# Patient Record
Sex: MALE | Race: WHITE | NOT HISPANIC OR LATINO | Employment: PART TIME | URBAN - METROPOLITAN AREA
[De-identification: names, ages, dates, MRNs, and addresses within clinical notes are randomized per-mention and may not be internally consistent; named-entity substitution may affect disease eponyms.]

---

## 2017-02-16 DIAGNOSIS — I10 ESSENTIAL HYPERTENSION: ICD-10-CM

## 2017-02-16 NOTE — TELEPHONE ENCOUNTER
:Lisinopril-hydrochlorothiazide       Last Written Prescription Date: 01/05/16  Last Fill Quantity: 90, # refills: 3  Last Office Visit with G, P or [facility] prescribing provider: 01/05/16       Potassium   Date Value Ref Range Status   11/26/2014 4.0 3.4 - 5.3 mmol/L Final     Creatinine   Date Value Ref Range Status   11/26/2014 0.80 0.66 - 1.25 mg/dL Final     BP Readings from Last 3 Encounters:   01/05/16 126/84   11/26/14 (!) 128/92

## 2017-02-16 NOTE — TELEPHONE ENCOUNTER
Routing refill request to provider for review/approval because:    Patient needs to be seen because: pt instructed in 1/5/2016 OV to   Follow-Up:in 3 months     Patient needs to be seen because it has been more than 1 year since last office visit.

## 2017-02-17 RX ORDER — LISINOPRIL/HYDROCHLOROTHIAZIDE 10-12.5 MG
1 TABLET ORAL DAILY
Qty: 90 TABLET | Refills: 3 | Status: SHIPPED | OUTPATIENT
Start: 2017-02-17 | End: 2018-02-14

## 2018-02-14 ENCOUNTER — OFFICE VISIT (OUTPATIENT)
Dept: INTERNAL MEDICINE | Facility: CLINIC | Age: 58
End: 2018-02-14
Payer: COMMERCIAL

## 2018-02-14 VITALS
BODY MASS INDEX: 27.06 KG/M2 | OXYGEN SATURATION: 97 % | SYSTOLIC BLOOD PRESSURE: 148 MMHG | DIASTOLIC BLOOD PRESSURE: 98 MMHG | HEIGHT: 71 IN | WEIGHT: 193.31 LBS | HEART RATE: 84 BPM | TEMPERATURE: 98.2 F

## 2018-02-14 DIAGNOSIS — Z23 NEED FOR PROPHYLACTIC VACCINATION AND INOCULATION AGAINST INFLUENZA: ICD-10-CM

## 2018-02-14 DIAGNOSIS — Z00.00 ROUTINE GENERAL MEDICAL EXAMINATION AT A HEALTH CARE FACILITY: Primary | ICD-10-CM

## 2018-02-14 DIAGNOSIS — I10 BENIGN ESSENTIAL HYPERTENSION: ICD-10-CM

## 2018-02-14 LAB
ALBUMIN SERPL-MCNC: 4 G/DL (ref 3.4–5)
ALBUMIN UR-MCNC: ABNORMAL MG/DL
ALP SERPL-CCNC: 70 U/L (ref 40–150)
ALT SERPL W P-5'-P-CCNC: 25 U/L (ref 0–70)
ANION GAP SERPL CALCULATED.3IONS-SCNC: 7 MMOL/L (ref 3–14)
APPEARANCE UR: CLEAR
AST SERPL W P-5'-P-CCNC: 19 U/L (ref 0–45)
BACTERIA #/AREA URNS HPF: ABNORMAL /HPF
BILIRUB SERPL-MCNC: 0.8 MG/DL (ref 0.2–1.3)
BILIRUB UR QL STRIP: NEGATIVE
BUN SERPL-MCNC: 13 MG/DL (ref 7–30)
CALCIUM SERPL-MCNC: 9.2 MG/DL (ref 8.5–10.1)
CHLORIDE SERPL-SCNC: 100 MMOL/L (ref 94–109)
CHOLEST SERPL-MCNC: 175 MG/DL
CO2 SERPL-SCNC: 32 MMOL/L (ref 20–32)
COLOR UR AUTO: YELLOW
CREAT SERPL-MCNC: 0.97 MG/DL (ref 0.66–1.25)
ERYTHROCYTE [DISTWIDTH] IN BLOOD BY AUTOMATED COUNT: 13.3 % (ref 10–15)
GFR SERPL CREATININE-BSD FRML MDRD: 79 ML/MIN/1.7M2
GLUCOSE SERPL-MCNC: 103 MG/DL (ref 70–99)
GLUCOSE UR STRIP-MCNC: NEGATIVE MG/DL
HCT VFR BLD AUTO: 45.7 % (ref 40–53)
HDLC SERPL-MCNC: 58 MG/DL
HGB BLD-MCNC: 14.9 G/DL (ref 13.3–17.7)
HGB UR QL STRIP: NEGATIVE
KETONES UR STRIP-MCNC: NEGATIVE MG/DL
LDLC SERPL CALC-MCNC: 100 MG/DL
LEUKOCYTE ESTERASE UR QL STRIP: NEGATIVE
MCH RBC QN AUTO: 32.3 PG (ref 26.5–33)
MCHC RBC AUTO-ENTMCNC: 32.6 G/DL (ref 31.5–36.5)
MCV RBC AUTO: 99 FL (ref 78–100)
MUCOUS THREADS #/AREA URNS LPF: PRESENT /LPF
NITRATE UR QL: NEGATIVE
NONHDLC SERPL-MCNC: 117 MG/DL
PH UR STRIP: 7 PH (ref 5–7)
PLATELET # BLD AUTO: 175 10E9/L (ref 150–450)
POTASSIUM SERPL-SCNC: 3.9 MMOL/L (ref 3.4–5.3)
PROT SERPL-MCNC: 7.3 G/DL (ref 6.8–8.8)
PSA SERPL-ACNC: 11.8 UG/L (ref 0–4)
RBC # BLD AUTO: 4.61 10E12/L (ref 4.4–5.9)
RBC #/AREA URNS AUTO: ABNORMAL /HPF
SODIUM SERPL-SCNC: 139 MMOL/L (ref 133–144)
SOURCE: ABNORMAL
SP GR UR STRIP: 1.02 (ref 1–1.03)
TRIGL SERPL-MCNC: 85 MG/DL
TSH SERPL DL<=0.005 MIU/L-ACNC: 1.41 MU/L (ref 0.4–4)
UROBILINOGEN UR STRIP-ACNC: 0.2 EU/DL (ref 0.2–1)
WBC # BLD AUTO: 5.2 10E9/L (ref 4–11)
WBC #/AREA URNS AUTO: ABNORMAL /HPF

## 2018-02-14 PROCEDURE — 99396 PREV VISIT EST AGE 40-64: CPT | Performed by: INTERNAL MEDICINE

## 2018-02-14 PROCEDURE — 84443 ASSAY THYROID STIM HORMONE: CPT | Performed by: INTERNAL MEDICINE

## 2018-02-14 PROCEDURE — 80061 LIPID PANEL: CPT | Performed by: INTERNAL MEDICINE

## 2018-02-14 PROCEDURE — 90686 IIV4 VACC NO PRSV 0.5 ML IM: CPT | Performed by: INTERNAL MEDICINE

## 2018-02-14 PROCEDURE — G0103 PSA SCREENING: HCPCS | Performed by: INTERNAL MEDICINE

## 2018-02-14 PROCEDURE — 80053 COMPREHEN METABOLIC PANEL: CPT | Performed by: INTERNAL MEDICINE

## 2018-02-14 PROCEDURE — 81001 URINALYSIS AUTO W/SCOPE: CPT | Performed by: INTERNAL MEDICINE

## 2018-02-14 PROCEDURE — 36415 COLL VENOUS BLD VENIPUNCTURE: CPT | Performed by: INTERNAL MEDICINE

## 2018-02-14 PROCEDURE — 85027 COMPLETE CBC AUTOMATED: CPT | Performed by: INTERNAL MEDICINE

## 2018-02-14 PROCEDURE — 90471 IMMUNIZATION ADMIN: CPT | Performed by: INTERNAL MEDICINE

## 2018-02-14 RX ORDER — LISINOPRIL AND HYDROCHLOROTHIAZIDE 20; 25 MG/1; MG/1
1 TABLET ORAL DAILY
Qty: 90 TABLET | Refills: 3 | Status: SHIPPED | OUTPATIENT
Start: 2018-02-14 | End: 2019-02-20

## 2018-02-14 NOTE — PROGRESS NOTES

## 2018-02-14 NOTE — MR AVS SNAPSHOT
After Visit Summary   2/14/2018    Jeramie Sánchez    MRN: [id number]           Patient Information     Date Of Birth          1960        Visit Information        Provider Department      2/14/2018 8:00 AM Maninder Espinoza MD [facility]        Today's Diagnoses     Routine general medical examination at a health care facility    -  1    Benign essential hypertension           Follow-ups after your visit        Additional Services     GASTROENTEROLOGY ADULT REF PROCEDURE ONLY       Last Lab Result: Creatinine (mg/dL)       Date                     Value                 11/26/2014               0.80             ----------  There is no height or weight on file to calculate BMI.     Needed:  No  Language:  English    Patient will be contacted to schedule procedure.     Please be aware that coverage of these services is subject to the terms and limitations of your health insurance plan.  Call member services at your health plan with any benefit or coverage questions.  Any procedures must be performed at a [facility] facility OR coordinated by your clinic's referral office.    Please bring the following with you to your appointment:    (1) Any X-Rays, CTs or MRIs which have been performed.  Contact the facility where they were done to arrange for  prior to your scheduled appointment.    (2) List of current medications   (3) This referral request   (4) Any documents/labs given to you for this referral                  Follow-up notes from your care team     Return in about 4 weeks (around 3/14/2018) for BP Recheck.      Who to contact     If you have questions or need follow up information about today's clinic visit or your schedule please contact [facility] directly at [phone].  Normal or non-critical lab and imaging results will be communicated to you by MyChart, letter or phone within 4 business days after the clinic has received the  "results. If you do not hear from us within 7 days, please contact the clinic through [employer] or phone. If you have a critical or abnormal lab result, we will notify you by phone as soon as possible.  Submit refill requests through [employer] or call your pharmacy and they will forward the refill request to us. Please allow 3 business days for your refill to be completed.          Additional Information About Your Visit        [employer] Information     [employer] lets you send messages to your doctor, view your test results, renew your prescriptions, schedule appointments and more. To sign up, go to [email or web address] . Click on \"Log in\" on the left side of the screen, which will take you to the Welcome page. Then click on \"Sign up Now\" on the right side of the page.     You will be asked to enter the access code listed below, as well as some personal information. Please follow the directions to create your username and password.     Your access code is: [email or web address]  Expires: 5/15/2018  8:32 AM     Your access code will  in 90 days. If you need help or a new code, please call your [facility] clinic or [phone].        Care EveryWhere ID     This is your Care EveryWhere ID. This could be used by other organizations to access your [facility] medical records  [id number]        Your Vitals Were     Pulse Temperature Height Pulse Oximetry BMI (Body Mass Index)       84 98.2  F (36.8  C) (Oral) 5' 10.5\" (1.791 m) 97% 27.35 kg/m2        Blood Pressure from Last 3 Encounters:   18 (!) 148/98   16 126/84   14 (!) 128/92    Weight from Last 3 Encounters:   18 193 lb 5 oz (87.7 kg)   16 191 lb 3.2 oz (86.7 kg)   14 177 lb (80.3 kg)              We Performed the Following     *UA reflex to Microscopic     CBC with platelets     Comprehensive metabolic panel     GASTROENTEROLOGY ADULT REF PROCEDURE ONLY     Lipid panel reflex to direct LDL Fasting     Prostate spec antigen screen     TSH " with free T4 reflex          Today's Medication Changes          These changes are accurate as of 2/14/18  8:32 AM.  If you have any questions, ask your nurse or doctor.               Start taking these medicines.        Dose/Directions    lisinopril-hydrochlorothiazide 20-25 MG per tablet   Commonly known as:  PRINZIDE/ZESTORETIC   Used for:  Benign essential hypertension   Replaces:  lisinopril-hydrochlorothiazide 10-12.5 MG per tablet   Started by:  Maninder Espinoza MD        Dose:  1 tablet   Take 1 tablet by mouth daily   Quantity:  90 tablet   Refills:  3         Stop taking these medicines if you haven't already. Please contact your care team if you have questions.     lisinopril-hydrochlorothiazide 10-12.5 MG per tablet   Commonly known as:  PRINZIDE/ZESTORETIC   Replaced by:  lisinopril-hydrochlorothiazide 20-25 MG per tablet   Stopped by:  Maninder Espinoza MD                Where to get your medicines      These medications were sent to [facility] [address] [place] [address]  AT [address] [address]     Phone:  [phone]     lisinopril-hydrochlorothiazide 20-25 MG per tablet                Primary Care Provider Fax #    Physician No Ref-Primary [phone]       No address on file        Equal Access to Services     ENDY JAMES AH: Oriana abbotto Somarisol, waaxda luqadaha, qaybta kaalmada adeegyada, suha sol. So [facility] [phone].    ATENCIÓN: Si habla español, tiene a gibson disposición servicios gratuitos de asistencia lingüística. ame al [phone].    We comply with applicable federal civil rights laws and [place] laws. We do not discriminate on the basis of race, color, national origin, age, disability, sex, sexual orientation, or gender identity.            Thank you!     Thank you for choosing [facility]  for your care. Our goal is always to provide you  with excellent care. Hearing back from our patients is one way we can continue to improve our services. Please take a few minutes to complete the written survey that you may receive in the mail after your visit with us. Thank you!             Your Updated Medication List - Protect others around you: Learn how to safely use, store and throw away your medicines at www.disposemymeds.org.          This list is accurate as of 2/14/18  8:32 AM.  Always use your most recent med list.                   Brand Name Dispense Instructions for use Diagnosis    lisinopril-hydrochlorothiazide 20-25 MG per tablet    PRINZIDE/ZESTORETIC    90 tablet    Take 1 tablet by mouth daily    Benign essential hypertension       Multi-vitamin Tabs tablet      Take 1 tablet by mouth daily

## 2018-02-14 NOTE — LETTER
February 15, 2018      Jeramie Sánchez  [address] [address] [facility] [address]        Dear ,    We are writing to inform you of your test results.    Your test results fall within the expected range(s) or remain unchanged from previous results.  Please continue with current treatment plan.    Resulted Orders   CBC with platelets   Result Value Ref Range    WBC 5.2 4.0 - 11.0 10e9/L    RBC Count 4.61 4.4 - 5.9 10e12/L    Hemoglobin 14.9 13.3 - 17.7 g/dL    Hematocrit 45.7 40.0 - 53.0 %    MCV 99 78 - 100 fl    MCH 32.3 26.5 - 33.0 pg    MCHC 32.6 31.5 - 36.5 g/dL    RDW 13.3 10.0 - 15.0 %    Platelet Count 175 150 - 450 10e9/L   Comprehensive metabolic panel   Result Value Ref Range    Sodium 139 133 - 144 mmol/L    Potassium 3.9 3.4 - 5.3 mmol/L    Chloride 100 94 - 109 mmol/L    Carbon Dioxide 32 20 - 32 mmol/L    Anion Gap 7 3 - 14 mmol/L    Glucose 103 (H) 70 - 99 mg/dL      Comment:      Fasting specimen    Urea Nitrogen 13 7 - 30 mg/dL    Creatinine 0.97 0.66 - 1.25 mg/dL    GFR Estimate 79 >60 mL/min/1.7m2      Comment:      Non  GFR Calc    GFR Estimate If Black >90 >60 mL/min/1.7m2      Comment:       GFR Calc    Calcium 9.2 8.5 - 10.1 mg/dL    Bilirubin Total 0.8 0.2 - 1.3 mg/dL    Albumin 4.0 3.4 - 5.0 g/dL    Protein Total 7.3 6.8 - 8.8 g/dL    Alkaline Phosphatase 70 40 - 150 U/L    ALT 25 0 - 70 U/L    AST 19 0 - 45 U/L   Lipid panel reflex to direct LDL Fasting   Result Value Ref Range    Cholesterol 175 <200 mg/dL    Triglycerides 85 <150 mg/dL      Comment:      Fasting specimen    HDL Cholesterol 58 >39 mg/dL    LDL Cholesterol Calculated 100 (H) <100 mg/dL      Comment:      Above desirable:  100-129 mg/dl  Borderline High:  130-159 mg/dL  High:             160-189 mg/dL  Very high:       >189 mg/dl      Non HDL Cholesterol 117 <130 mg/dL   TSH with free T4 reflex   Result Value Ref Range    TSH 1.41 0.40 - 4.00 mU/L   Prostate spec antigen screen    Result Value Ref Range    PSA 11.80 (H) 0 - 4 ug/L      Comment:      Assay Method:  Chemiluminescence using Siemens Vista analyzer   *UA reflex to Microscopic   Result Value Ref Range    Color Urine Yellow     Appearance Urine Clear     Glucose Urine Negative NEG^Negative mg/dL    Bilirubin Urine Negative NEG^Negative    Ketones Urine Negative NEG^Negative mg/dL    Specific Gravity Urine 1.020 1.003 - 1.035    Blood Urine Negative NEG^Negative    pH Urine 7.0 5.0 - 7.0 pH    Protein Albumin Urine Trace (A) NEG^Negative mg/dL    Urobilinogen Urine 0.2 0.2 - 1.0 EU/dL    Nitrite Urine Negative NEG^Negative    Leukocyte Esterase Urine Negative NEG^Negative    Source Midstream Urine    Urine Microscopic   Result Value Ref Range    WBC Urine O - 2 OTO2^O - 2 /HPF    RBC Urine O - 2 OTO2^O - 2 /HPF    Bacteria Urine Few (A) NEG^Negative /HPF    Mucous Urine Present (A) NEG^Negative /LPF       If you have any questions or concerns, please call the clinic at the number listed above.       Sincerely,  Maninder Espinoza MD

## 2018-02-14 NOTE — NURSING NOTE
"Chief Complaint   Patient presents with     Physical     Fasting Px       Initial BP (!) 148/98  Pulse 84  Temp 98.2  F (36.8  C) (Oral)  Ht 5' 10.5\" (1.791 m)  Wt 193 lb 5 oz (87.7 kg)  SpO2 97%  BMI 27.35 kg/m2 Estimated body mass index is 27.35 kg/(m^2) as calculated from the following:    Height as of this encounter: 5' 10.5\" (1.791 m).    Weight as of this encounter: 193 lb 5 oz (87.7 kg).  Medication Reconciliation: complete   Lilo Terry MA      "

## 2018-04-09 ENCOUNTER — TELEPHONE (OUTPATIENT)
Dept: INTERNAL MEDICINE | Facility: CLINIC | Age: 58
End: 2018-04-09

## 2018-04-09 NOTE — TELEPHONE ENCOUNTER
Panel Management Review      Patient has the following on his problem list:     Hypertension   Last three blood pressure readings:  BP Readings from Last 3 Encounters:   02/14/18 (!) 148/98   01/05/16 126/84   11/26/14 (!) 128/92     Blood pressure: FAILED    HTN Guidelines:  Age 18-59 BP range:  Less than 140/90  Age 60-85 with Diabetes:  Less than 140/90  Age 60-85 without Diabetes:  less than 150/90      Composite cancer screening  Chart review shows that this patient is due/due soon for the following Colonoscopy  Summary:    Patient is due/failing the following:   COLONOSCOPY    Action needed:   none    Type of outreach:    Phone, spoke to patient.  Scheduled Colonoscopy for 05/04/2018    Questions for provider review:    None                                                                                                                                    Lilo Terry MA       Chart routed to none .

## 2018-04-24 ENCOUNTER — TRANSFERRED RECORDS (OUTPATIENT)
Dept: HEALTH INFORMATION MANAGEMENT | Facility: CLINIC | Age: 58
End: 2018-04-24

## 2018-05-04 ENCOUNTER — HOSPITAL ENCOUNTER (OUTPATIENT)
Facility: CLINIC | Age: 58
Discharge: HOME | End: 2018-05-04
Attending: INTERNAL MEDICINE | Admitting: INTERNAL MEDICINE
Payer: COMMERCIAL

## 2018-05-04 VITALS
BODY MASS INDEX: 26.88 KG/M2 | SYSTOLIC BLOOD PRESSURE: 126 MMHG | WEIGHT: 192 LBS | HEIGHT: 71 IN | RESPIRATION RATE: 5 BRPM | OXYGEN SATURATION: 95 % | DIASTOLIC BLOOD PRESSURE: 95 MMHG

## 2018-05-04 LAB — COLONOSCOPY: NORMAL

## 2018-05-04 PROCEDURE — 88305 TISSUE EXAM BY PATHOLOGIST: CPT | Performed by: INTERNAL MEDICINE

## 2018-05-04 PROCEDURE — G0500 MOD SEDAT ENDO SERVICE >5YRS: HCPCS | Performed by: INTERNAL MEDICINE

## 2018-05-04 PROCEDURE — 88305 TISSUE EXAM BY PATHOLOGIST: CPT | Mod: 26 | Performed by: INTERNAL MEDICINE

## 2018-05-04 PROCEDURE — 25000128 H RX IP 250 OP 636: Performed by: INTERNAL MEDICINE

## 2018-05-04 PROCEDURE — 45385 COLONOSCOPY W/LESION REMOVAL: CPT | Performed by: INTERNAL MEDICINE

## 2018-05-04 PROCEDURE — 99153 MOD SED SAME PHYS/QHP EA: CPT | Performed by: INTERNAL MEDICINE

## 2018-05-04 RX ORDER — FENTANYL CITRATE 50 UG/ML
INJECTION, SOLUTION INTRAMUSCULAR; INTRAVENOUS PRN
Status: DISCONTINUED | OUTPATIENT
Start: 2018-05-04 | End: 2018-05-04 | Stop reason: HOSPADM

## 2018-05-04 RX ORDER — LIDOCAINE 40 MG/G
CREAM TOPICAL
Status: DISCONTINUED | OUTPATIENT
Start: 2018-05-04 | End: 2018-05-04 | Stop reason: HOSPADM

## 2018-05-04 RX ORDER — ONDANSETRON 2 MG/ML
4 INJECTION INTRAMUSCULAR; INTRAVENOUS
Status: DISCONTINUED | OUTPATIENT
Start: 2018-05-04 | End: 2018-05-04 | Stop reason: HOSPADM

## 2018-05-04 NOTE — H&P
"Pre-Endoscopy History and Physical     Jeramie Sánchez MRN# [id number]   YOB: 1960 Age: 57 year old     Date of Procedure: 5/4/2018  Primary care provider: No Ref-Primary, Physician  Type of Endoscopy: colonoscopy  Reason for Procedure: colonoscopy  Type of Anesthesia Anticipated: Conscious Sedation    HPI:    Jeramie is a 57 year old male who will be undergoing the above procedure.      A history and physical has been performed. The patient's medications and allergies have been reviewed. The risks and benefits of the procedure and the sedation options and risks were discussed with the patient.  All questions were answered and informed consent was obtained.      He denies a personal or family history of anesthesia complications or bleeding disorders.     Patient Active Problem List   Diagnosis     Hip pain     Essential hypertension        Past Medical History:   Diagnosis Date     Arthritis      Hypertension         Past Surgical History:   Procedure Laterality Date     ARTHROPLASTY HIP ANTERIOR Left 11/25/2014    Procedure: ARTHROPLASTY HIP ANTERIOR;  Surgeon: Drew Phelps MD;  Location: [facility] OR     ENT SURGERY      jaw repair for dental reasons       Relevant Family History: NONE    Relevant Social History: NONE     Prior to Admission medications    Medication Sig Start Date End Date Taking? Authorizing Provider   lisinopril-hydrochlorothiazide (PRINZIDE/ZESTORETIC) 20-25 MG per tablet Take 1 tablet by mouth daily 2/14/18  Yes Maninder Espinoza MD   multivitamin, therapeutic with minerals (MULTI-VITAMIN) TABS Take 1 tablet by mouth daily   Yes Reported, Patient       No Known Allergies     REVIEW OF SYSTEMS:   A relevant review of systems was performed and was negative    PHYSICAL EXAM:   Resp 20  Ht 1.791 m (5' 10.5\")  Wt 87.1 kg (192 lb)  SpO2 97%  BMI 27.16 kg/m2 Estimated body mass index is 27.16 kg/(m^2) as calculated from the following:    Height as of this encounter: 1.791 m (5' " "10.5\").    Weight as of this encounter: 87.1 kg (192 lb).   GENERAL APPEARANCE: alert, and oriented  MENTAL STATUS: alert  AIRWAY EXAM: Normal  RESP: lungs clear to auscultation - no rales, rhonchi or wheezes  CV: regular rates and rhythm  DIAGNOSTICS:    Not indicated    IMPRESSION   ASA Class 2 - Mild systemic disease    PLAN:   Plan for colonoscopy. We discussed the risks, benefits and alternatives and the patient wished to proceed.      Signed Electronically by: Torsten Longoria  May 4, 2018            "

## 2018-05-04 NOTE — IP AVS SNAPSHOT
MRN:[id number]                      After Visit Summary   5/4/2018    Jeramie Sánchez    MRN: [id number]           Thank you!     Thank you for choosing [facility] for your care. Our goal is always to provide you with excellent care. Hearing back from our patients is one way we can continue to improve our services. Please take a few minutes to complete the written survey that you may receive in the mail after you visit. If you would like to speak to someone directly about your visit please contact Patient Relations at [phone]. Thank you!          Patient Information     Date Of Birth          1960        About your hospital stay     You were admitted on:  May 4, 2018 You last received care in the:  [facility]    You were discharged on:  May 4, 2018       Who to Call     For medical emergencies, please call 911.  For non-urgent questions about your medical care, please call your primary care provider or clinic, None  For questions related to your surgery, please call your surgery clinic        Attending Provider     Provider Specialty    Link, Torsten SALMERON MD Gastroenterology       Primary Care Provider Fax #    Physician No Ref-Primary [phone]      Your next 10 appointments already scheduled     May 04, 2018  3:30 PM CDT   New Visit with Manda Lunsford PA-C   [facility] ([facility]    [address] [place] [id number] [phone]              Further instructions from your care team            Understanding Colon and Rectal Polyps     The colon has a smooth lining composed of millions of cells.     The colon (also called the large intestine) is a muscular tube that forms the last part of the digestive tract. It absorbs water and stores food waste. The colon is about 4 to 6 feet long. The rectum is the last 6 inches of the colon. The colon and rectum have a smooth lining composed of  millions of cells. Changes in these cells can lead to growths in the colon that can become cancerous and should be removed.     When the Colon Lining Changes  Changes that occur in the cells that line the colon or rectum can lead to growths called polyps. Over a period of years, polyps can turn cancerous. Removing polyps early may prevent cancer from ever forming.      Polyps  Polyps are fleshy clumps of tissue that form on the lining of the colon or rectum. Small polyps are usually benign (not cancerous). However, over time, cells in a polyp can change and become cancerous. The larger a polyp grows, the more likely this is to happen. Also, certain types of polyps known as adenomatous polyps are considered premalignant. This means that they will almost always become cancerous if they re not removed.          Cancer  Almost all colorectal cancers start when polyp cells begin growing abnormally. As a cancerous tumor grows, it may involve more and more of the colon or rectum. In time, cancer can also grow beyond the colon or rectum and spread to nearby organs or to glands called lymph nodes. The cells can also travel to other parts of the body. This is known as metastasis. The earlier a cancerous tumor is removed, the better the chance of preventing its spread.        4780-7998 [facility], [address] [place] [address]. All rights reserved. This information is not intended as a substitute for professional medical care. Always follow your healthcare professional's instructions.    Understanding Diverticulosis and Diverticulitis     Pouches or diverticula usually occur in the lower part of the colon called the sigmoid.      Diverticulitis occurs when the pouches become inflamed.     The colon (large intestine) is the last part of the digestive tract. It absorbs water from stool and changes it from a liquid to a solid. In certain cases, small pouches called diverticula can form in the colon wall. This  condition is called diverticulosis. The pouches can become infected. If this happens, it becomes a more serious problem called diverticulitis. These problems can be painful. But they can be managed.   Managing Your Condition  Diet changes or taking medications are often tried first. These may be enough to bring relief. If the case is bad, surgery may be done. You and your doctor can discuss the plan that is best for you.  If You Have Diverticulosis  Diet changes are often enough to control symptoms. The main changes are adding fiber (roughage) and drinking more water. Fiber absorbs water as it travels through your colon. This helps your stool stay soft and move smoothly. Water helps this process. If needed, you may be told to take over-the-counter stool softeners. To help relieve pain, antispasmodic medications may be prescribed.  If You Have Diverticulitis  Treatment depends on how bad your symptoms are.  For mild symptoms: You may be put on a liquid diet for a short time. You may also be prescribed antibiotics. If these two steps relieve your symptoms, you may then be prescribed a high-fiber diet. If you still have symptoms, your doctor will discuss further treatment options with you.  For severe symptoms: You may need to be admitted to the hospital. There, you can be given IV antibiotics and fluids. Once symptoms are under control, the above treatments may be tried. If these don t control your condition, your doctor may discuss the option of having surgery with you.  [place]fa to Colon Health  Help keep your colon healthy with a diet that includes plenty of high-fiber fruits, vegetables, and whole grains. Drink plenty of liquids like water and juice. Your doctor may also recommend avoiding seeds and nuts.          [id number] [facility], [address] [place] [address], [place] [address]. All rights reserved. This information is not intended as a substitute for professional medical care. Always follow your healthcare  professional's instructions.    HIGH FIBER DIET  Fiber is present in all fruits, vegetables, cereals and grains. Fiber passes through the body undigested. A high fiber diet helps food move through the intestinal tract. The added bulk is helpful in preventing constipation. In people with diverticulosis it serves to clean out the pouches along the colon wall while preventing new ones from forming. A high fiber diet also reduces the risk of colon cancer, decreases blood cholesterol and prevents high blood sugar in people with diabetes.    The foods listed below are high in fiber and should be included in your diet. If you are not used to high fiber foods, start with 1 or 2 foods from this list. Every 3-4 days add a new one to your diet until you are eating 4 high fiber foods per day. This should give you 20-35 Gm of fiber/day. It is also important to drink a lot of water when you are on this diet (6-8 glasses a day). Water causes the fiber to swell and increases the benefit.    FOODS HIGH IN DIETARY FIBER:  BREADS: Made with 100% whole wheat flour; christian, wheat or rye crackers; tortillas, bran muffins  CEREALS: Whole grain cereal with bran (Chex, Raisin Bran, Corn Bran), oatmeal, rolled oats, granola, wheat flakes, brown rice  NUTS: Any nuts  FRUITS: All fresh fruits along with edible skins, (bananas, citrus fruit, mangoes, pears, prunes, raisins, apples, pineapple, apricot, melon, jams and marmalades), fruit juices (especially prune juice)  VEGETABLES: All types, preferably raw or lightly cooked: especially, celery, eggplant, potatoes, spinach, broccoli, brussel sprouts, winter squash, carrots, cauliflower, soybeans, lentils, fresh and dried beans of all kinds  OTHER: Popcorn, any spices      [id number] [facility], [address] [place] [address], [place] [address]. All rights reserved. This information is not intended as a substitute for professional medical care. Always follow your healthcare professional's  "instructions.    Pending Results     No orders found from 2018 to 2018.            Admission Information     Date & Time Provider Department Dept. Phone    2018 Link, Torsten SALMERON MD [facility] [phone]      Your Vitals Were     Blood Pressure Respirations Height Weight Pulse Oximetry BMI (Body Mass Index)    140/78 5 1.791 m (5' 10.5\") 87.1 kg (192 lb) 97% 27.16 kg/m2      [employer] Information     [facility] lets you send messages to your doctor, view your test results, renew your prescriptions, schedule appointments and more. To sign up, go to [email or web address] . Click on \"Log in\" on the left side of the screen, which will take you to the Welcome page. Then click on \"Sign up Now\" on the right side of the page.     You will be asked to enter the access code listed below, as well as some personal information. Please follow the directions to create your username and password.     Your access code is: [email or web address]  Expires: 5/15/2018  9:32 AM     Your access code will  in 90 days. If you need help or a new code, please call your [place] clinic or [phone].        Care EveryWhere ID     This is your Care EveryWhere ID. This could be used by other organizations to access your [place] medical records  [id number]        Equal Access to Services     [facility] : Hadii libia brewer hadasho Soanibalali, waaxda luqadaha, qaybta kaalmada adeegyada, suha reid . So [facility] [phone].    ATENCIÓN: Si habla español, tiene a gibson disposición servicios gratuitos de asistencia lingüística. Arturo al [phone].    We comply with applicable federal civil rights laws and [place] laws. We do not discriminate on the basis of race, color, national origin, age, disability, sex, sexual orientation, or gender identity.               Review of your medicines      UNREVIEWED medicines. Ask your doctor about these medicines        Dose / Directions    " lisinopril-hydrochlorothiazide 20-25 MG per tablet   Commonly known as:  PRINZIDE/ZESTORETIC   Used for:  Benign essential hypertension        Dose:  1 tablet   Take 1 tablet by mouth daily   Quantity:  90 tablet   Refills:  3       Multi-vitamin Tabs tablet        Dose:  1 tablet   Take 1 tablet by mouth daily   Refills:  0                Protect others around you: Learn how to safely use, store and throw away your medicines at www.disposemymeds.org.             Medication List: This is a list of all your medications and when to take them. Check marks below indicate your daily home schedule. Keep this list as a reference.      Medications           Morning Afternoon Evening Bedtime As Needed    lisinopril-hydrochlorothiazide 20-25 MG per tablet   Commonly known as:  PRINZIDE/ZESTORETIC   Take 1 tablet by mouth daily                                Multi-vitamin Tabs tablet   Take 1 tablet by mouth daily

## 2018-05-04 NOTE — LETTER
April 5, 2018      Jeramie Sánchez  [address] [address] [facility] [place] [address]        Dear Jeramie,       Thank you for choosing [facility]. You are scheduled for the following service.     Date:  5-4-18             Procedure:  COLONOSCOPY  Doctor:        Von   Arrival Time:  0830  *check in at Emergency/Endoscopy desk*  Procedure Time:  0900    Location:   [facility]        Endoscopy Department, First Floor (Enter through ER Doors) *        [address] [place], [place] [address]      [phone] or [phone] () to reschedule      MIRALAX -GATORADE  PREP  Colonoscopy is the most accurate test to detect colon polyps and colon cancer; and the only test where polyps can be removed. During this procedure, a doctor examines the lining of your large intestine and rectum through a flexible tube.           Transportation  Arrange for a ride for the day of your procedure with a responsible adult.  A taxi ride is not an option unless you are accompanied by a responsible adult. If you fail to arrange transportation with a responsible adult, your procedure will be cancelled and rescheduled.    Purchase the  following supplies at your local pharmacy:  - 2 (two) bisacodyl tablets: each tablet contains 5 mg.  (Dulcolax  laxative NOT Dulcolax  stool softener)   - 1 (one) 8.3 oz bottle of Polyethylene Glycol (PEG) 3350 Powder   (MiraLAX , Smooth LAX , ClearLAX  or equivalent)  - 64 oz Gatorade    Regular Gatorade, Gatorade G2 , Powerade , Powerade Zero  or Pedialyte  is acceptable. Red colored flavors are not allowed; all other colors (yellow, green, orange, purple and blue) are okay. It is also okay to buy two 2.12 oz packets of powdered Gatorade that can be mixed with water to a total volume of 64 oz of liquid.  - 1 (one) 10 oz bottle of Magnesium Citrate (Red colored flavors are not allowed)  It is also okay for you to use a 0.5 oz package of powdered magnesium  citrate (17 g) mixed with 10 oz of water.    PREPARATION FOR COLONOSCOPY    7 days before:    Discontinue fiber supplements and medications containing iron. This includes Metamucil  and Fibercon ; and multivitamins with iron.  3 days before:    Begin a low-fiber diet. A low-fiber diet helps making the cleanout more effective.     Examples of a low-fiber diet include (but are not limited to): white bread, white rice, pasta, crackers, fish, chicken, eggs, ground beef, creamy peanut butter, cooked/steamed/boiled vegetables, canned fruit, bananas, melons, milk, plain yogurt cheese, salad dressing and other condiments.     The following are not allowed on a low-fiber diet: seeds, nuts, popcorn, bran, whole wheat, corn, quinoa, raw fruits and vegetables, berries and dried fruit, beans and lentils.    For additional details on low-fiber diet, please refer to the table on the last page.  2 days before:    Continue the low-fiber diet.     Drink at least 8 glasses of water throughout the day.     Stop eating solid foods at 11:45 pm.  1 day before:    In the morning: begin a clear liquid diet (liquids you can see through).     Examples of a clear liquid diet include: water, clear broth or bouillon, Gatorade, Pedialyte or Powerade, carbonated and non-carbonated soft drinks (Sprite , 7-Up , ginger ale), strained fruit juices without pulp (apple, white grape, white cranberry), Jell-O  and popsicles.     The following are not allowed on a clear liquid diet: red liquids, alcoholic beverages, coffee, dairy products (milk, creamer, and yogurt), protein shakes, creamy broths, juice with pulp and chewing tobacco.    At noon: take 2 (two) bisacodyl tablets     At 4 (and no later than 6pm): start drinking the Miralax-Gatorade preparation (8.3 oz of Miralax mixed with 64 oz of Gatorade in a large pitcher). Drink 1(one) 8 oz glass every 15 minutes thereafter, until the mixture is gone.    COLON CLEANSING TIPS: drink adequate amounts of  fluids before and after your colon cleansing to prevent dehydration. Stay near a toilet because you will have diarrhea. Even if you are sitting on the toilet, continue to drink the cleansing solution every 15 minutes. If you feel nauseous or vomit, rinse your mouth with water, take a 15 to 30-minute-break and then continue drinking the solution. You will be uncomfortable until the stool has flushed from your colon (in about 2 to 4 hours). You may feel chilled.              Day of your procedure  You may take all of your morning medications including blood pressure medications, blood thinners (if you have not been instructed to stop these by our office), methadone, anti-seizure medications with sips of water 3 hours prior to your procedure or earlier. Do not take insulin or vitamins prior to your procedure. Continue the clear liquid diet.   4 hours prior: drink 10 oz of magnesium citrate. It may be easier to drink it with a straw.    STOP consuming all liquids after that.     Do not take anything by mouth during this time.     Allow extra time to travel to your procedure as you may need to stop and use a restroom along the way.  You are ready for the procedure, if you followed all instructions and your stool is no longer formed, but clear or yellow liquid. If you are unsure whether your colon is clean, please call our office at [phone] before you leave for your appointment.  Bring the following to your procedure:  - Insurance Card/Photo ID.   - List of current medications including over-the-counter medications and supplements.   - Your rescue inhaler if you currently use one to control asthma.      Canceling or rescheduling your appointment:   If you must cancel or reschedule your appointment, please call [phone] as soon as possible.      COLONOSCOPY PRE-PROCEDURE CHECKLIST  If you have diabetes, ask your regular doctor for diet and medication restrictions.  If you take an anticoagulant or anti-platelet  medication (such as Coumadin , Lovenox , Pradaxa , Xarelto , Eliquis , etc.), please call your primary doctor for advice on holding this medication.  If you take aspirin you may continue to do so.  If you are or may be pregnant, please discuss the risks and benefits of this procedure with your doctor.          What happens during a colonoscopy?    Plan to spend up to two hours, starting at registration time, at the endoscopy center the day of your procedure. The colonoscopy takes an average of 15 to 30 minutes. Recovery time is about 30 minutes.    Before the exam:    You will change into a gown.    Your medical history and medication list will be reviewed with you, unless that has been done over the phone prior to the procedure.     A nurse will insert an intravenous (IV) line into your hand or arm.    The doctor will meet with you and will give you a consent form to sign.    During the exam:     Medicine will be given through the IV line to help you relax.     Your heart rate and oxygen levels will be monitored. If your blood pressure is low, you may be given fluids through the IV line.     The doctor will insert a flexible hollow tube, called a colonoscope, into your rectum. The scope will be advanced slowly through the large intestine (colon).    You may have a feeling of fullness or pressure.     If an abnormal tissue or a polyp is found, the doctor may remove it through the endoscope for closer examination, or biopsy. Tissue removal is painless    After the exam:           Any tissue samples removed during the exam will be sent to a lab for evaluation. It may take 5-7 working days for you to be notified of the results.     A nurse will provide you with complete discharge instructions before you leave the endoscopy center. Be sure to ask the nurse for specific instructions if you take blood thinners such as Aspirin, Coumadin or Plavix.     The doctor will prepare a full report for you and for the physician who  referred you for the procedure.     Your doctor will talk with you about the initial results of your exam.      Medication given during the exam will prohibit you from driving for the rest of the day.     Following the exam, you may resume your normal diet. Your first meal should be light, no greasy foods. Avoid alcohol until the next day.     You may resume your regular activities the day after the procedure.     LOW-FIBER DIET    Foods RECOMMENDED Foods to AVOID   Breads, Cereal, Rice and Pasta:   White bread, rolls, biscuits, croissant and jere toast.   Waffles, Singaporean toast and pancakes.   White rice, noodles, pasta, macaroni and peeled cooked potatoes.   Plain crackers and saltines.   Cooked cereals: farina, cream of rice.   Cold cereals: Puffed Rice , Rice Krispies , Corn Flakes  and Special K    Breads, Cereal, Rice and Pasta:   Breads or rolls with nuts, seeds or fruit.   Whole wheat, pumpernickel, rye breads and cornbread.   Potatoes with skin, brown or wild rice, and kasha (buckwheat).     Vegetables:   Tender cooked and canned vegetables without seeds: carrots, asparagus tips, green or wax beans, pumpkin, spinach, lima beans. Vegetables:   Raw or steamed vegetables.   Vegetables with seeds.   Sauerkraut.   Winter squash, peas, broccoli, Brussel sprouts, cabbage, onions, cauliflower, baked beans, peas and corn.   Fruits:   Strained fruit juice.   Canned fruit, except pineapple.   Ripe bananas and melon. Fruits:   Prunes and prune juice.   Raw fruits.   Dried fruits: figs, dates and raisins.   Milk/Dairy:   Milk: plain or flavored.   Yogurt, custard and ice cream.   Cheese and cottage cheese Milk/Dairy:     Meat and other proteins:   ground, well-cooked tender beef, lamb, ham, veal, pork, fish, poultry and organ meats.   Eggs.   Peanut butter without nuts. Meat and other proteins:   Tough, fibrous meats with gristle.   Dry beans, peas and lentils.   Peanut butter with nuts.   Tofu.   Fats, Snack, Sweets,  Condiments and Beverages:   Margarine, butter, oils, mayonnaise, sour cream and salad dressing, plain gravy.   Sugar, hard candy, clear jelly, honey and syrup.   Spices, cooked herbs, bouillon, broth and soups made with allowed vegetable, ketchup and mustard.   Coffee, tea and carbonated drinks.   Plain cakes, cookies and pretzels.   Gelatin, plain puddings, custard, ice cream, sherbet and popsicles. Fats, Snack, Sweets, Condiments and Beverages:   Nuts, seeds and coconut.   Jam, marmalade and preserves.   Pickles, olives, relish and horseradish.   All desserts containing nuts, seeds, dried fruit and coconut; or made from whole grains or bran.   Candy made with nuts or seeds.   Popcorn.                     DIRECTIONS TO THE ENDOSCOPY DEPARTMENT     From the north ([facility])  Take [address] South, exit on [address] [address]. Get into the left hand alban, turn left (east), go one-half mile to [address] and turn left. Go north to the first stoplight, take a right on [address] and follow it to the Emergency entrance.    From the south ([facility])  Take 35N to the 35E split and exit on [address] [address]. On [address] [address], turn left (west) to [address]. Turn right (north) on [address]. Go north to the first stoplight, take a right on [address] and follow it to the Emergency entrance.    From the east via 35E ([facility])  Take [address]E south to [address] [address] exit. Turn right on [address] [address]. Go west to [address]. Turn right (north) on [address]. Go to the first stoplight, take a right and follow on [address] to the Emergency entrance.    From the east via [address] ([facility])  Take [address] West to [address]. Turn left (south) on [address] to [address]. Turn left (east) on [address] and follow it to the Emergency entrance.    From the west via [address] ([place])  Take [address] east to [address].  Turn right (south) on [address] to [address]. Turn left (east) on [address] and follow it to the Emergency entrance.

## 2018-05-04 NOTE — DISCHARGE INSTRUCTIONS
Understanding Colon and Rectal Polyps     The colon has a smooth lining composed of millions of cells.     The colon (also called the large intestine) is a muscular tube that forms the last part of the digestive tract. It absorbs water and stores food waste. The colon is about 4 to 6 feet long. The rectum is the last 6 inches of the colon. The colon and rectum have a smooth lining composed of millions of cells. Changes in these cells can lead to growths in the colon that can become cancerous and should be removed.     When the Colon Lining Changes  Changes that occur in the cells that line the colon or rectum can lead to growths called polyps. Over a period of years, polyps can turn cancerous. Removing polyps early may prevent cancer from ever forming.      Polyps  Polyps are fleshy clumps of tissue that form on the lining of the colon or rectum. Small polyps are usually benign (not cancerous). However, over time, cells in a polyp can change and become cancerous. The larger a polyp grows, the more likely this is to happen. Also, certain types of polyps known as adenomatous polyps are considered premalignant. This means that they will almost always become cancerous if they re not removed.          Cancer  Almost all colorectal cancers start when polyp cells begin growing abnormally. As a cancerous tumor grows, it may involve more and more of the colon or rectum. In time, cancer can also grow beyond the colon or rectum and spread to nearby organs or to glands called lymph nodes. The cells can also travel to other parts of the body. This is known as metastasis. The earlier a cancerous tumor is removed, the better the chance of preventing its spread.        4944-4988 [facility], [address] [place] [address], [place] [address]. All rights reserved. This information is not intended as a substitute for professional medical care. Always follow your healthcare professional's instructions.    Understanding Diverticulosis  and Diverticulitis     Pouches or diverticula usually occur in the lower part of the colon called the sigmoid.      Diverticulitis occurs when the pouches become inflamed.     The colon (large intestine) is the last part of the digestive tract. It absorbs water from stool and changes it from a liquid to a solid. In certain cases, small pouches called diverticula can form in the colon wall. This condition is called diverticulosis. The pouches can become infected. If this happens, it becomes a more serious problem called diverticulitis. These problems can be painful. But they can be managed.   Managing Your Condition  Diet changes or taking medications are often tried first. These may be enough to bring relief. If the case is bad, surgery may be done. You and your doctor can discuss the plan that is best for you.  If You Have Diverticulosis  Diet changes are often enough to control symptoms. The main changes are adding fiber (roughage) and drinking more water. Fiber absorbs water as it travels through your colon. This helps your stool stay soft and move smoothly. Water helps this process. If needed, you may be told to take over-the-counter stool softeners. To help relieve pain, antispasmodic medications may be prescribed.  If You Have Diverticulitis  Treatment depends on how bad your symptoms are.  For mild symptoms: You may be put on a liquid diet for a short time. You may also be prescribed antibiotics. If these two steps relieve your symptoms, you may then be prescribed a high-fiber diet. If you still have symptoms, your doctor will discuss further treatment options with you.  For severe symptoms: You may need to be admitted to the hospital. There, you can be given IV antibiotics and fluids. Once symptoms are under control, the above treatments may be tried. If these don t control your condition, your doctor may discuss the option of having surgery with you.  [place] to Colon Health  Help keep your colon healthy with  a diet that includes plenty of high-fiber fruits, vegetables, and whole grains. Drink plenty of liquids like water and juice. Your doctor may also recommend avoiding seeds and nuts.          5162-5687 [employer], [address] [place] [address], [place] [address]. All rights reserved. This information is not intended as a substitute for professional medical care. Always follow your healthcare professional's instructions.    HIGH FIBER DIET  Fiber is present in all fruits, vegetables, cereals and grains. Fiber passes through the body undigested. A high fiber diet helps food move through the intestinal tract. The added bulk is helpful in preventing constipation. In people with diverticulosis it serves to clean out the pouches along the colon wall while preventing new ones from forming. A high fiber diet also reduces the risk of colon cancer, decreases blood cholesterol and prevents high blood sugar in people with diabetes.    The foods listed below are high in fiber and should be included in your diet. If you are not used to high fiber foods, start with 1 or 2 foods from this list. Every 3-4 days add a new one to your diet until you are eating 4 high fiber foods per day. This should give you 20-35 Gm of fiber/day. It is also important to drink a lot of water when you are on this diet (6-8 glasses a day). Water causes the fiber to swell and increases the benefit.    FOODS HIGH IN DIETARY FIBER:  BREADS: Made with 100% whole wheat flour; christian, wheat or rye crackers; tortillas, bran muffins  CEREALS: Whole grain cereal with bran (Chex, Raisin Bran, Corn Bran), oatmeal, rolled oats, granola, wheat flakes, brown rice  NUTS: Any nuts  FRUITS: All fresh fruits along with edible skins, (bananas, citrus fruit, mangoes, pears, prunes, raisins, apples, pineapple, apricot, melon, jams and marmalades), fruit juices (especially prune juice)  VEGETABLES: All types, preferably raw or lightly cooked: especially, celery, eggplant,  potatoes, spinach, broccoli, brussel sprouts, winter squash, carrots, cauliflower, soybeans, lentils, fresh and dried beans of all kinds  OTHER: Popcorn, any spices      [id number] [employer] [address] [address]. All rights reserved. This information is not intended as a substitute for professional medical care. Always follow your healthcare professional's instructions.

## 2018-05-07 LAB — COPATH REPORT: NORMAL

## 2018-05-21 DIAGNOSIS — R06.83 SNORING: Primary | ICD-10-CM

## 2018-07-10 ENCOUNTER — HOSPITAL PATHOLOGY (OUTPATIENT)
Dept: OTHER | Facility: CLINIC | Age: 58
End: 2018-07-10

## 2018-07-12 LAB — COPATH REPORT: NORMAL

## 2018-08-29 DIAGNOSIS — Z00.00 ROUTINE GENERAL MEDICAL EXAMINATION AT A HEALTH CARE FACILITY: Primary | ICD-10-CM

## 2018-09-06 ENCOUNTER — OFFICE VISIT (OUTPATIENT)
Dept: SLEEP MEDICINE | Facility: CLINIC | Age: 58
End: 2018-09-06
Attending: OTOLARYNGOLOGY
Payer: COMMERCIAL

## 2018-09-06 VITALS
OXYGEN SATURATION: 100 % | SYSTOLIC BLOOD PRESSURE: 131 MMHG | DIASTOLIC BLOOD PRESSURE: 93 MMHG | WEIGHT: 190.6 LBS | HEIGHT: 71 IN | BODY MASS INDEX: 26.68 KG/M2 | RESPIRATION RATE: 18 BRPM | HEART RATE: 71 BPM

## 2018-09-06 DIAGNOSIS — I10 ESSENTIAL HYPERTENSION: ICD-10-CM

## 2018-09-06 DIAGNOSIS — R06.83 SNORING: ICD-10-CM

## 2018-09-06 DIAGNOSIS — R06.81 WITNESSED APNEIC SPELLS: ICD-10-CM

## 2018-09-06 DIAGNOSIS — R29.818 SUSPECTED SLEEP APNEA: Primary | ICD-10-CM

## 2018-09-06 PROCEDURE — 99203 OFFICE O/P NEW LOW 30 MIN: CPT | Performed by: INTERNAL MEDICINE

## 2018-09-06 NOTE — MR AVS SNAPSHOT
After Visit Summary   9/6/2018    Jeramie Sánchez    MRN: [id number]           Patient Information     Date Of Birth          1960        Visit Information        Provider Department      9/6/2018 9:00 AM Jus Patricia MD [facility]        Today's Diagnoses     Suspected sleep apnea    -  1    Snoring        Witnessed apneic spells        Essential hypertension          Care Instructions      Your BMI is Body mass index is 26.96 kg/(m^2).  Weight management is a personal decision.  If you are interested in exploring weight loss strategies, the following discussion covers the approaches that may be successful. Body mass index (BMI) is one way to tell whether you are at a healthy weight, overweight, or obese. It measures your weight in relation to your height.  A BMI of 18.5 to 24.9 is in the healthy range. A person with a BMI of 25 to 29.9 is considered overweight, and someone with a BMI of 30 or greater is considered obese. More than two-thirds of American adults are considered overweight or obese.  Being overweight or obese increases the risk for further weight gain. Excess weight may lead to heart disease and diabetes.  Creating and following plans for healthy eating and physical activity may help you improve your health.  Weight control is part of healthy lifestyle and includes exercise, emotional health, and healthy eating habits. Careful eating habits lifelong are the mainstay of weight control. Though there are significant health benefits from weight loss, long-term weight loss with diet alone may be very difficult to achieve- studies show long-term success with dietary management in less than 10% of people. Attaining a healthy weight may be especially difficult to achieve in those with severe obesity. In some cases, medications, devices and surgical management might be considered.  What can you do?  If you are overweight or obese and are interested in methods for weight  loss, you should discuss this with your provider.     Consider reducing daily calorie intake by 500 calories.     Keep a food journal.     Avoiding skipping meals, consider cutting portions instead.    Diet combined with exercise helps maintain muscle while optimizing fat loss. Strength training is particularly important for building and maintaining muscle mass. Exercise helps reduce stress, increase energy, and improves fitness. Increasing exercise without diet control, however, may not burn enough calories to loose weight.       Start walking three days a week 10-20 minutes at a time    Work towards walking thirty minutes five days a week     Eventually, increase the speed of your walking for 1-2 minutes at time    In addition, we recommend that you review healthy lifestyles and methods for weight loss available through the National Institutes of Health patient information sites:  http://win.niddk.nih.gov/publications/index.htm    And look into health and wellness programs that may be available through your health insurance provider, employer, local community center, or tito club.    Weight management plan: Patient was referred to their PCP to discuss a diet and exercise plan.    Patient to follow up with Primary Care provider regarding elevated blood pressure.          Follow-ups after your visit        Your next 10 appointments already scheduled     Sep 17, 2018  1:00 PM CDT   HST  with BED 7 SH SLEEP   [facility] ([facility])    [address] [address]   [phone]            Sep 18, 2018  9:15 AM CDT   HST Drop Off with BED 7 SH SLEEP   [facility] ([facility])    [address] [address]   [phone]            Sep 24, 2018 10:00 AM CDT   Return Sleep Patient with Jus Patricia MD   [facility] ([facility])    [address] [facility]  "[address] [id number] [phone]              Future tests that were ordered for you today     Open Future Orders        Priority Expected Expires Ordered    HST-Home Sleep Apnea Test Routine  3/8/2019 2018            Who to contact     If you have questions or need follow up information about today's clinic visit or your schedule please contact [facility] directly at [phone].  Normal or non-critical lab and imaging results will be communicated to you by SoSociohart, letter or phone within 4 business days after the clinic has received the results. If you do not hear from us within 7 days, please contact the clinic through [facility] or phone. If you have a critical or abnormal lab result, we will notify you by phone as soon as possible.  Submit refill requests through [employer] or call your pharmacy and they will forward the refill request to us. Please allow 3 business days for your refill to be completed.          Additional Information About Your Visit        [employer] Information     [employer] lets you send messages to your doctor, view your test results, renew your prescriptions, schedule appointments and more. To sign up, go to [email or web address] . Click on \"Log in\" on the left side of the screen, which will take you to the Welcome page. Then click on \"Sign up Now\" on the right side of the page.     You will be asked to enter the access code listed below, as well as some personal information. Please follow the directions to create your username and password.     Your access code is: [email or web address]  Expires: 2018  9:40 AM     Your access code will  in 90 days. If you need help or a new code, please call your [facility] clinic or [phone].        Care EveryWhere ID     This is your Care EveryWhere ID. This could be used by other organizations to access your [facility] medical records  [id number]        Your Vitals Were     Pulse Respirations Height Pulse " "Oximetry BMI (Body Mass Index)       71 18 1.791 m (5' 10.5\") 100% 26.96 kg/m2        Blood Pressure from Last 3 Encounters:   09/06/18 (!) 131/93   05/04/18 (!) 126/95   02/14/18 (!) 148/98    Weight from Last 3 Encounters:   09/06/18 86.5 kg (190 lb 9.6 oz)   05/04/18 87.1 kg (192 lb)   02/14/18 87.7 kg (193 lb 5 oz)              We Performed the Following     SLEEP EVALUATION & MANAGEMENT REFERRAL - ADULT -[facility] [phone]  (Age 18 and up)        Primary Care Provider Fax #    Physician No Ref-Primary [phone]       No address on file        Equal Access to Services     ENDY JAMES : Oriana Malik, waronal luqadaha, qaybta kaalmada tobias, suha reid . So [facility] [phone].    ATENCIÓN: Si habla español, tiene a gibson disposición servicios gratuitos de asistencia lingüística. Llame al [phone].    We comply with applicable federal civil rights laws and [place] laws. We do not discriminate on the basis of race, color, national origin, age, disability, sex, sexual orientation, or gender identity.            Thank you!     Thank you for choosing [facility]  for your care. Our goal is always to provide you with excellent care. Hearing back from our patients is one way we can continue to improve our services. Please take a few minutes to complete the written survey that you may receive in the mail after your visit with us. Thank you!             Your Updated Medication List - Protect others around you: Learn how to safely use, store and throw away your medicines at www.disposemymeds.org.          This list is accurate as of 9/6/18  9:48 AM.  Always use your most recent med list.                   Brand Name Dispense Instructions for use Diagnosis    lisinopril-hydrochlorothiazide 20-25 MG per tablet    PRINZIDE/ZESTORETIC    90 tablet    Take 1 tablet by mouth daily    Benign essential hypertension       " Multi-vitamin Tabs tablet      Take 1 tablet by mouth daily

## 2018-09-06 NOTE — NURSING NOTE
"Chief Complaint   Patient presents with     Sleep Problem       Initial BP (!) 142/92  Pulse 71  Resp 18  Ht 1.791 m (5' 10.5\")  Wt 86.5 kg (190 lb 9.6 oz)  SpO2 100%  BMI 26.96 kg/m2 Estimated body mass index is 26.96 kg/(m^2) as calculated from the following:    Height as of this encounter: 1.791 m (5' 10.5\").    Weight as of this encounter: 86.5 kg (190 lb 9.6 oz).    Medication Reconciliation: complete    Neck circumference: 41 centimeters    ESS 2    Laureen Ishpeming  Sleep Clinic - Specialist        "

## 2018-09-06 NOTE — PROGRESS NOTES
Sleep Evaluation:    Date on this visit: 9/6/2018    Primary Physician: No Ref-Primary, Physician     Chief complaint: snoring, apneas in sleep     Presenting History:     Jeramie Sánchez is sent by Marcellus Islas for a sleep consultation regarding possible sleep apnea.    Jeramie Sánchez reports nightly snoring for last one year.     Jeramie goes to sleep at 9:30 PM during the week. He wakes up at 5:00 AM with an alarm. He falls asleep in 10 minutes.  Jeramie denies difficulty falling asleep.  He wakes up 0-1 times a night for 10 minutes before falling back to sleep.  Jreamie wakes up to uncertain reasons.  On weekends, Jeramie goes to sleep at 10:00 PM.  He wakes up at 6:00 AM without an alarm. He falls asleep in 10 minutes.  Patient gets an average of 7 hours of sleep per night.     Patient does not use electronics in bed.     Jeramie does not do shift work.  He works day shifts.      Jeramie does snore every night. Patient does have a regular bed partner. There is report of snoring, gasping and snorting.  He does have witnessed apneas. They occasionally sleep separately.  Patient sleeps on his side. He has occasional morning dry mouth, denies no morning headaches and restless legs. Jeramie denies any bruxism, sleep walking, sleep talking, dream enactment, sleep paralysis, cataplexy and hypnogogic/hypnopompic hallucinations.    He denies sleep walking as a child.  Jeramie has difficulty breathing through his nose.      Patient's Austin Sleepiness score 2/24 consistent with no daytime sleepiness.      Jeramie naps 0 times per week. He takes no inadvertant naps.  He denies closing eyes, dozing and falling asleep while driving.   Patient was counseled on the importance of driving while alert, to pull over if drowsy, or nap before getting into the vehicle if sleepy.      He uses 2-3 cups/day of coffee. Last caffeine intake is usually before noon.    Allergies:    No Known Allergies    Medications:    Current  Outpatient Prescriptions   Medication Sig Dispense Refill     lisinopril-hydrochlorothiazide (PRINZIDE/ZESTORETIC) 20-25 MG per tablet Take 1 tablet by mouth daily 90 tablet 3     multivitamin, therapeutic with minerals (MULTI-VITAMIN) TABS Take 1 tablet by mouth daily         Problem List:  Patient Active Problem List    Diagnosis Date Noted     Essential hypertension 01/05/2016     Priority: Medium     Hip pain 11/25/2014     Priority: Medium        Past Medical/Surgical History:  Past Medical History:   Diagnosis Date     Arthritis      Hypertension      Past Surgical History:   Procedure Laterality Date     ARTHROPLASTY HIP ANTERIOR Left 11/25/2014    Procedure: ARTHROPLASTY HIP ANTERIOR;  Surgeon: Drew Phelps MD;  Location:  OR     ENT SURGERY      jaw repair for dental reasons       Social History:    Lives with his wife and works as a . No history of smoking. Occasional use of alcohol.     Social History     Social History     Marital status:      Spouse name: N/A     Number of children: N/A     Years of education: N/A     Occupational History     Not on file.     Social History Main Topics     Smoking status: Never Smoker     Smokeless tobacco: Never Used     Alcohol use Yes      Comment: 3 on weekends     Drug use: Not on file     Sexual activity: Yes     Other Topics Concern     Not on file     Social History Narrative       Family History:    No family history of sleep disorders.     Family History   Problem Relation Age of Onset     Other Cancer Mother      Heart Surgery Father      Diabetes No family hx of      Colon Cancer No family hx of        Review of Systems:  A complete review of systems reviewed by me is negative with the exeption of what has been mentioned in the history of present illness.  CONSTITUTIONAL: NEGATIVE for weight gain/loss, fever, chills, sweats or night sweats, drug allergies.  EYES: NEGATIVE for changes in vision, blind spots, double vision.  ENT:  "NEGATIVE for ear pain, sore throat, sinus pain, post-nasal drip, runny nose, bloody nose  CARDIAC: NEGATIVE for fast heartbeats or fluttering in chest, chest pain or pressure, breathlessness when lying flat, swollen legs or swollen feet.  NEUROLOGIC: NEGATIVE headaches, weakness or numbness in the arms or legs.  DERMATOLOGIC: NEGATIVE for rashes, new moles or change in mole(s)  PULMONARY: NEGATIVE SOB at rest, SOB with activity, dry cough, productive cough, coughing up blood, wheezing or whistling when breathing.    GASTROINTESTINAL: NEGATIVE for nausea or vomitting, loose or watery stools, fat or grease in stools, constipation, abdominal pain, bowel movements black in color or blood noted.  GENITOURINARY: NEGATIVE for pain during urination, blood in urine, urinating more frequently than usual, irregular menstrual periods.  MUSCULOSKELETAL: NEGATIVE for muscle pain, bone or joint pain, swollen joints.  ENDOCRINE: NEGATIVE for increased thirst or urination, diabetes.  LYMPHATIC: NEGATIVE for swollen lymph nodes, lumps or bumps in the breasts or nipple discharge.    Physical Examination:  Vitals: BP (!) 142/92  Pulse 71  Resp 18  Ht 1.791 m (5' 10.5\")  Wt 86.5 kg (190 lb 9.6 oz)  SpO2 100%  BMI 26.96 kg/m2  BMI= Body mass index is 26.96 kg/(m^2).         [place] Total Score 9/6/2018   Total score - [place] 2          GENERAL APPEARANCE: healthy, alert and no distress  EYES: Eyes grossly normal to inspection, PERRL and conjunctivae and sclerae normal  HENT: nose and mouth without ulcers or lesions, oropharynx crowded, uvula elongated and soft palate dependent  NECK: no adenopathy, no asymmetry, masses, or scars and thyroid normal to palpation  RESP: lungs clear to auscultation - no rales, rhonchi or wheezes  CV: regular rates and rhythm, normal S1 S2, no S3 or S4 and no murmur, click or rub  ABDOMEN: soft, nontender, without hepatosplenomegaly or masses  MS: extremities normal- no gross deformities noted  NEURO: " Normal strength and tone, mentation intact and speech normal  PSYCH: mentation appears normal and affect normal/bright  Mallampati Class: IV.  Tonsillar Stage: 1  hidden by pillars.    Impression/Plan:    1. To rule out obstructive sleep apnea  2. Hypertension     - Patient is a 58 years old male, with BMI 26, neck circumference 41 cm, with history of hypertension who presents with a history of loud snoring and witnessed apneas. Oropharynx is crowded on examination. There is a high risk for sleep apnea and an overnight sleep study is recommended. Patient prefers home sleep test. Limitations of HST were discussed.      Plan:     1. Home sleep apnea test         He will follow up with me in approximately two weeks after his sleep study has been competed to review the results and discuss plan of care.       Polysomnography & HST  reviewed.  Obstructive sleep apnea reviewed.  Complications of untreated sleep apnea were reviewed.    I spent a total of 30 minutes with patient with more than 50% in counseling       Jus Patricia     CC: Marcellus Islas

## 2018-09-06 NOTE — PATIENT INSTRUCTIONS

## 2018-09-17 ENCOUNTER — OFFICE VISIT (OUTPATIENT)
Dept: SLEEP MEDICINE | Facility: CLINIC | Age: 58
End: 2018-09-17
Payer: COMMERCIAL

## 2018-09-17 DIAGNOSIS — G47.33 OSA (OBSTRUCTIVE SLEEP APNEA): ICD-10-CM

## 2018-09-17 DIAGNOSIS — I10 ESSENTIAL HYPERTENSION: ICD-10-CM

## 2018-09-17 DIAGNOSIS — R29.818 SUSPECTED SLEEP APNEA: ICD-10-CM

## 2018-09-17 DIAGNOSIS — R06.83 SNORING: ICD-10-CM

## 2018-09-17 DIAGNOSIS — R06.81 WITNESSED APNEIC SPELLS: ICD-10-CM

## 2018-09-17 PROCEDURE — G0399 HOME SLEEP TEST/TYPE 3 PORTA: HCPCS | Performed by: INTERNAL MEDICINE

## 2018-09-17 NOTE — MR AVS SNAPSHOT
"              After Visit Summary   9/17/2018    Jeramie Sánchez    MRN: [id number]           Patient Information     Date Of Birth          1960        Visit Information        Provider Department      9/17/2018 1:00 PM BED 7 SH SLEEP [facility]        Today's Diagnoses     Snoring        Witnessed apneic spells        Essential hypertension        Suspected sleep apnea        JASSON (obstructive sleep apnea)           Follow-ups after your visit        Your next 10 appointments already scheduled     Sep 24, 2018 10:00 AM CDT   Return Sleep Patient with Jus Patricia MD   [facility] ([facility])    [address] [id number] [phone]              Who to contact     If you have questions or need follow up information about today's clinic visit or your schedule please contact [facility] [place] directly at [phone].  Normal or non-critical lab and imaging results will be communicated to you by MyChart, letter or phone within 4 business days after the clinic has received the results. If you do not hear from us within 7 days, please contact the clinic through MyChart or phone. If you have a critical or abnormal lab result, we will notify you by phone as soon as possible.  Submit refill requests through Loudeye or call your pharmacy and they will forward the refill request to us. Please allow 3 business days for your refill to be completed.          Additional Information About Your Visit        MyChart Information     Loudeye lets you send messages to your doctor, view your test results, renew your prescriptions, schedule appointments and more. To sign up, go to [email or web address] . Click on \"Log in\" on the left side of the screen, which will take you to the Welcome page. Then click on \"Sign up Now\" on the right side of the page.     You will be asked to enter the access code listed below, as well as some " personal information. Please follow the directions to create your username and password.     Your access code is: [email or web address]  Expires: 2018  9:40 AM     Your access code will  in 90 days. If you need help or a new code, please call your [place] clinic or [phone].        Care EveryWhere ID     This is your Care EveryWhere ID. This could be used by other organizations to access your [place] medical records  [id number]         Blood Pressure from Last 3 Encounters:   18 (!) 131/93   18 (!) 126/95   18 (!) 148/98    Weight from Last 3 Encounters:   18 86.5 kg (190 lb 9.6 oz)   18 87.1 kg (192 lb)   18 87.7 kg (193 lb 5 oz)              We Performed the Following     HST-Home Sleep Apnea Test        Primary Care Provider Fax #    Physician No Ref-Primary [phone]       No address on file        Equal Access to Services     NABIL [facility] : Hadii libia brewer hadasho Soomaali, waaxda luqadaha, qaybta kaalmada adeegyasharmaine, suha reid . So [facility] [phone].    ATENCIÓN: Si habla español, tiene a gibson disposición servicios gratuitos de asistencia lingüística. Llame al [phone].    We comply with applicable federal civil rights laws and [place] laws. We do not discriminate on the basis of race, color, national origin, age, disability, sex, sexual orientation, or gender identity.            Thank you!     Thank you for choosing [facility]  for your care. Our goal is always to provide you with excellent care. Hearing back from our patients is one way we can continue to improve our services. Please take a few minutes to complete the written survey that you may receive in the mail after your visit with us. Thank you!             Your Updated Medication List - Protect others around you: Learn how to safely use, store and throw away your medicines at www.disposemymeds.org.          This list is accurate as of 18 11:59 PM.   Always use your most recent med list.                   Brand Name Dispense Instructions for use Diagnosis    lisinopril-hydrochlorothiazide 20-25 MG per tablet    PRINZIDE/ZESTORETIC    90 tablet    Take 1 tablet by mouth daily    Benign essential hypertension       Multi-vitamin Tabs tablet      Take 1 tablet by mouth daily

## 2018-09-18 ENCOUNTER — DOCUMENTATION ONLY (OUTPATIENT)
Dept: SLEEP MEDICINE | Facility: CLINIC | Age: 58
End: 2018-09-18
Payer: COMMERCIAL

## 2018-09-18 DIAGNOSIS — G47.33 OSA (OBSTRUCTIVE SLEEP APNEA): ICD-10-CM

## 2018-09-18 NOTE — PROCEDURES
"HOME SLEEP STUDY INTERPRETATION    Patient: Jeramie Sánchez  MRN: [id number]  YOB: 1960  Study Date: 2018  Referring Provider: No Ref-Primary, Physician;   Ordering Provider: Jus Patricia MD, MD     Indications for Home Study: Jeramie Sánchez is a 58 year old male with a history of hypertension who presents with symptoms suggestive of obstructive sleep apnea.    Estimated body mass index is 26.96 kg/(m^2) as calculated from the following:    Height as of 18: 1.791 m (5' 10.5\").    Weight as of 18: 86.5 kg (190 lb 9.6 oz).  Total score - [place]: 2 (2018  9:15 AM)  STOP-BAN/8    Data: A full night home sleep study was performed recording the standard physiologic parameters including body position, movement, sound, nasal pressure, thermal oral airflow, chest and abdominal movements with respiratory inductance plethysmography, and oxygen saturation by pulse oximetry. Pulse rate was estimated by oximetry recording. This study was considered adequate based on > 4 hours of quality oximetry and respiratory recording. As specified by the AASM Manual for the Scoring of Sleep and Associated events, version 2.3, Rule VIII.D 1B, 4% oxygen desaturation scoring for hypopneas is used as a standard of care on all home sleep apnea testing.    Analysis Time:  376.8 minutes    Respiration:   Sleep Associated Hypoxemia: sustained hypoxemia was present. Baseline oxygen saturation was 91.8%.  Time with saturation less than or equal to 88% was 141 minutes. The lowest oxygen saturation was 66%.   Snoring: Snoring was present.  Respiratory events: The home study revealed a presence of 340 obstructive apneas and 0 mixed and central apneas. There were 19 hypopneas resulting in a combined apnea/hypopnea index [AHI] of 57.2 events per hour.  AHI was 46.7 per hour supine, 55.6 per hour prone, 54.2 per hour on left side, and 59.3 per hour on right side.   Pattern: Excluding events noted above, respiratory " rate and pattern was Normal.    Position: Percent of time spent: supine - 2.4%, prone - 3.4%, on left - 33%, on right - 61%.    Heart Rate: By pulse oximetry normal rate was noted.     Assessment:   Severe obstructive sleep apnea.  Sleep associated hypoxemia was present.    Recommendations:  Consider auto-CPAP at 5-15 cmH2O.  Suggest optimizing sleep hygiene and avoiding sleep deprivation.  Weight management.    Diagnosis Code(s): Obstructive Sleep Apnea G47.33, Hypoxemia G47.36    Jus Patricia MD, MD, September 18, 2018   Diplomate, American Board of Psychiatry and Neurology, Sleep Medicine

## 2018-09-18 NOTE — PROGRESS NOTES
This HSAT was performed using a Noxturnal T3 device which recorded snore, sound, movement activity, body position, nasal pressure, oronasal thermal airflow, pulse, oximetry and both chest and abdominal respiratory effort. HSAT data was restricted to the time patient states they were in bed.     HSAT was scored using 1B 4% hypopnea rule.     AHI: 57.2. Snoring was reported as loud.  Time with SpO2 below 89% was 141.0 minutes.   Overall signal quality was good     Pt will follow up with sleep provider to determine appropriate therapy.     Ordering Harshad ZHAO Rakesh, MD Charles O. BA, [facility], RST System Clinical Specialist 9/18/2018

## 2018-09-24 ENCOUNTER — OFFICE VISIT (OUTPATIENT)
Dept: SLEEP MEDICINE | Facility: CLINIC | Age: 58
End: 2018-09-24
Payer: COMMERCIAL

## 2018-09-24 VITALS
WEIGHT: 185.4 LBS | OXYGEN SATURATION: 98 % | DIASTOLIC BLOOD PRESSURE: 91 MMHG | TEMPERATURE: 98.9 F | RESPIRATION RATE: 16 BRPM | HEIGHT: 71 IN | SYSTOLIC BLOOD PRESSURE: 137 MMHG | BODY MASS INDEX: 25.96 KG/M2 | HEART RATE: 75 BPM

## 2018-09-24 DIAGNOSIS — G47.33 OSA (OBSTRUCTIVE SLEEP APNEA): Primary | ICD-10-CM

## 2018-09-24 PROCEDURE — 99213 OFFICE O/P EST LOW 20 MIN: CPT | Performed by: INTERNAL MEDICINE

## 2018-09-24 NOTE — PROGRESS NOTES
Sleep Study Follow-Up Visit:    Date on this visit: 9/24/2018    Jeramie Sánchez comes in today for follow-up of his home sleep study done on 9/17/2018 at the [facility] for possible sleep apnea.    Respiratory events: The home study revealed a presence of 340 obstructive apneas and 0 mixed and central apneas. There were 19 hypopneas resulting in a combined apnea/hypopnea index [AHI] of 57.2 events per hour.  AHI was 46.7 per hour supine, 55.6 per hour prone, 54.2 per hour on left side, and 59.3 per hour on right side.   Pattern: Excluding events noted above, respiratory rate and pattern was Normal.    Sleep Associated Hypoxemia: sustained hypoxemia was present. Baseline oxygen saturation was 91.8%.  Time with saturation less than or equal to 88% was 141 minutes. The lowest oxygen saturation was 66%.   Snoring: Snoring was present.     Position: Percent of time spent: supine - 2.4%, prone - 3.4%, on left - 33%, on right - 61%.     Heart Rate: By pulse oximetry normal rate was noted.      Assessment:   Severe obstructive sleep apnea.  Sleep associated hypoxemia was present.    These findings were reviewed with patient.     Past medical/surgical history, family history, social history, medications and allergies were reviewed.      Problem List:  Patient Active Problem List    Diagnosis Date Noted     JASSON (obstructive sleep apnea) 09/18/2018     Priority: Medium     HST, 9/17/2018, AHI: 57        Essential hypertension 01/05/2016     Priority: Medium     Hip pain 11/25/2014     Priority: Medium        Impression/Plan:    1. Severe JASSON    - Patient was counseled regarding severe sleep apnea, consequences of untreated disease and management options. CPAP therapy is the treatment of choice.     Plan:    1. Start auto PAP therapy     He will follow up with me in about 7 week(s).     Fifteen minutes spent with patient, all of which were spent face-to-face counseling, consulting, coordinating plan  of care.      Dr. Jus Patricia      CC: No Ref-Primary, Physician

## 2018-09-24 NOTE — NURSING NOTE
"Chief Complaint   Patient presents with     Study Results     HST f/u       Initial BP (!) 137/91  Pulse 75  Temp 98.9  F (37.2  C) (Oral)  Resp 16  Ht 1.791 m (5' 10.5\")  Wt 84.1 kg (185 lb 6.4 oz)  SpO2 98%  BMI 26.23 kg/m2 Estimated body mass index is 26.23 kg/(m^2) as calculated from the following:    Height as of this encounter: 1.791 m (5' 10.5\").    Weight as of this encounter: 84.1 kg (185 lb 6.4 oz).    Medication Reconciliation: complete     ESS 2  Rani Shane        "

## 2018-09-24 NOTE — PATIENT INSTRUCTIONS

## 2018-09-24 NOTE — MR AVS SNAPSHOT
After Visit Summary   9/24/2018    Jeramie Sánchez    MRN: [id number]           Patient Information     Date Of Birth          1960        Visit Information        Provider Department      9/24/2018 10:00 AM Jus Patricia MD [facility]        Today's Diagnoses     JASSON (obstructive sleep apnea)    -  1      Care Instructions      Your BMI is Body mass index is 26.23 kg/(m^2).  Weight management is a personal decision.  If you are interested in exploring weight loss strategies, the following discussion covers the approaches that may be successful. Body mass index (BMI) is one way to tell whether you are at a healthy weight, overweight, or obese. It measures your weight in relation to your height.  A BMI of 18.5 to 24.9 is in the healthy range. A person with a BMI of 25 to 29.9 is considered overweight, and someone with a BMI of 30 or greater is considered obese. More than two-thirds of American adults are considered overweight or obese.  Being overweight or obese increases the risk for further weight gain. Excess weight may lead to heart disease and diabetes.  Creating and following plans for healthy eating and physical activity may help you improve your health.  Weight control is part of healthy lifestyle and includes exercise, emotional health, and healthy eating habits. Careful eating habits lifelong are the mainstay of weight control. Though there are significant health benefits from weight loss, long-term weight loss with diet alone may be very difficult to achieve- studies show long-term success with dietary management in less than 10% of people. Attaining a healthy weight may be especially difficult to achieve in those with severe obesity. In some cases, medications, devices and surgical management might be considered.  What can you do?  If you are overweight or obese and are interested in methods for weight loss, you should discuss this with your provider.     Consider  reducing daily calorie intake by 500 calories.     Keep a food journal.     Avoiding skipping meals, consider cutting portions instead.    Diet combined with exercise helps maintain muscle while optimizing fat loss. Strength training is particularly important for building and maintaining muscle mass. Exercise helps reduce stress, increase energy, and improves fitness. Increasing exercise without diet control, however, may not burn enough calories to loose weight.       Start walking three days a week 10-20 minutes at a time    Work towards walking thirty minutes five days a week     Eventually, increase the speed of your walking for 1-2 minutes at time    In addition, we recommend that you review healthy lifestyles and methods for weight loss available through the National Institutes of Health patient information sites:  http://win.niddk.nih.gov/publications/index.htm    And look into health and wellness programs that may be available through your health insurance provider, employer, local community center, or tito club.    Weight management plan: Patient was referred to their PCP to discuss a diet and exercise plan.   Patient to follow up with Primary Care provider regarding elevated blood pressure.            Follow-ups after your visit        Who to contact     If you have questions or need follow up information about today's clinic visit or your schedule please contact [facility] directly at [phone].  Normal or non-critical lab and imaging results will be communicated to you by MyChart, letter or phone within 4 business days after the clinic has received the results. If you do not hear from us within 7 days, please contact the clinic through MyChart or phone. If you have a critical or abnormal lab result, we will notify you by phone as soon as possible.  Submit refill requests through GTx or call your pharmacy and they will forward the refill request to us. Please allow 3 business  "days for your refill to be completed.          Additional Information About Your Visit        careersmorehart Information     Skwibl lets you send messages to your doctor, view your test results, renew your prescriptions, schedule appointments and more. To sign up, go to [email or web address] . Click on \"Log in\" on the left side of the screen, which will take you to the Welcome page. Then click on \"Sign up Now\" on the right side of the page.     You will be asked to enter the access code listed below, as well as some personal information. Please follow the directions to create your username and password.     Your access code is: [email or web address]  Expires: 2018  9:40 AM     Your access code will  in 90 days. If you need help or a new code, please call your [place] clinic or [phone].        Care EveryWhere ID     This is your Care EveryWhere ID. This could be used by other organizations to access your [place] medical records  [id number]        Your Vitals Were     Pulse Temperature Respirations Height Pulse Oximetry BMI (Body Mass Index)    75 98.9  F (37.2  C) (Oral) 16 1.791 m (5' 10.5\") 98% 26.23 kg/m2       Blood Pressure from Last 3 Encounters:   18 (!) 137/91   18 (!) 131/93   18 (!) 126/95    Weight from Last 3 Encounters:   18 84.1 kg (185 lb 6.4 oz)   18 86.5 kg (190 lb 9.6 oz)   18 87.1 kg (192 lb)              We Performed the Following     Comprehensive DME        Primary Care Provider Fax #    Physician No Ref-Primary [phone]       No address on file        Equal Access to Services     ENDY JAMES : Oriana Malik, archana rasmussen, racquel collado, suha sol. So [facility] [phone].    ATENCIÓN: Si habla español, tiene a gibson disposición servicios gratuitos de asistencia lingüística. Llame al [phone].    We comply with applicable federal civil rights laws and [place] laws. We do not " discriminate on the basis of race, color, national origin, age, disability, sex, sexual orientation, or gender identity.            Thank you!     Thank you for choosing [facility]  for your care. Our goal is always to provide you with excellent care. Hearing back from our patients is one way we can continue to improve our services. Please take a few minutes to complete the written survey that you may receive in the mail after your visit with us. Thank you!             Your Updated Medication List - Protect others around you: Learn how to safely use, store and throw away your medicines at www.disposemymeds.org.          This list is accurate as of 9/24/18 10:19 AM.  Always use your most recent med list.                   Brand Name Dispense Instructions for use Diagnosis    lisinopril-hydrochlorothiazide 20-25 MG per tablet    PRINZIDE/ZESTORETIC    90 tablet    Take 1 tablet by mouth daily    Benign essential hypertension       Multi-vitamin Tabs tablet      Take 1 tablet by mouth daily

## 2018-09-26 ENCOUNTER — TELEPHONE (OUTPATIENT)
Dept: SLEEP MEDICINE | Facility: CLINIC | Age: 58
End: 2018-09-26

## 2018-09-26 DIAGNOSIS — G47.33 OSA (OBSTRUCTIVE SLEEP APNEA): ICD-10-CM

## 2018-09-26 NOTE — TELEPHONE ENCOUNTER
Called patient to schedule new CPAP setup appointment.  Left message to call [facility] [phone] when ready to schedule.

## 2018-10-03 ENCOUNTER — TELEPHONE (OUTPATIENT)
Dept: SLEEP MEDICINE | Facility: CLINIC | Age: 58
End: 2018-10-03

## 2018-10-03 DIAGNOSIS — G47.33 OSA (OBSTRUCTIVE SLEEP APNEA): ICD-10-CM

## 2018-10-03 NOTE — TELEPHONE ENCOUNTER
Called patient and scheduled new CPAP setup for 1:00pm on Friday 10/5/18 with Hima DRAKE at [facility].  Gave patient address [address], [address], [place], [place] and [facility] Customer Service # [phone] to call if he has questions or needs to reschedule.

## 2018-10-05 ENCOUNTER — DOCUMENTATION ONLY (OUTPATIENT)
Dept: SLEEP MEDICINE | Facility: CLINIC | Age: 58
End: 2018-10-05
Payer: COMMERCIAL

## 2018-10-05 NOTE — PROGRESS NOTES
Patient was offered choice of vendor and chose [facility].  Patient Jeramie Sánchez was set up at [facility] on October 5, 2018. Patient received a Resmed AirSense 10 Auto. Pressures were set at 5-15 cm H2O.   Patient s ramp is 5 cm H2O for Auto and FLEX/EPR is 2.  Patient received a Resmed Mask name: AIRFIT P10  Pillow mask Size Large, heated tubing and heated humidifier.  Patient is enrolled in the STM Program and does not need to meet compliance. Patient has a follow up on 11/27/2018 with Dr. Patricia.    Anton Cooper

## 2018-10-08 ENCOUNTER — DOCUMENTATION ONLY (OUTPATIENT)
Dept: SLEEP MEDICINE | Facility: CLINIC | Age: 58
End: 2018-10-08

## 2018-10-08 DIAGNOSIS — G47.33 OSA (OBSTRUCTIVE SLEEP APNEA): ICD-10-CM

## 2018-10-08 NOTE — PROGRESS NOTES
3 DAY STM VISIT    Diagnostic AHI:   57.2 HST    Patient contacted for 3 day STM visit  Message left for patient to return call     Device type: Auto-CPAP  PAP settings from order::  CPAP min 5 cm  H20       CPAP max 15 cm  H20        Mask type:    Nasal Mask     Device settings from machine      Min CPAP 5.0            Max CPAP 15.0            Assessment: Nightly usage over four hours.  Action plan: Pt to have f/u 14 day STM visit.  Patient has a follow up visit scheduled:   yes within 31-90 days of set up.

## 2018-10-16 ENCOUNTER — DOCUMENTATION ONLY (OUTPATIENT)
Dept: SLEEP MEDICINE | Facility: CLINIC | Age: 58
End: 2018-10-16

## 2018-10-16 NOTE — PROGRESS NOTES
Left voice mail for patient to reschedule his appointment that was on 11/27/18. Dr. Patricia is out of the office, please reschedule follow-up.    Ligia Moralez

## 2018-10-17 ENCOUNTER — HOSPITAL ENCOUNTER (OUTPATIENT)
Dept: LAB | Facility: CLINIC | Age: 58
Discharge: HOME | End: 2018-10-17
Attending: ORTHOPAEDIC SURGERY | Admitting: ORTHOPAEDIC SURGERY
Payer: COMMERCIAL

## 2018-10-17 DIAGNOSIS — Z00.00 ROUTINE GENERAL MEDICAL EXAMINATION AT A HEALTH CARE FACILITY: ICD-10-CM

## 2018-10-17 LAB — HGB BLD-MCNC: 13.1 G/DL (ref 13.3–17.7)

## 2018-10-17 PROCEDURE — 36415 COLL VENOUS BLD VENIPUNCTURE: CPT | Performed by: ORTHOPAEDIC SURGERY

## 2018-10-17 PROCEDURE — 85018 HEMOGLOBIN: CPT | Performed by: ORTHOPAEDIC SURGERY

## 2018-10-22 ENCOUNTER — DOCUMENTATION ONLY (OUTPATIENT)
Dept: SLEEP MEDICINE | Facility: CLINIC | Age: 58
End: 2018-10-22

## 2018-10-22 DIAGNOSIS — G47.33 OSA (OBSTRUCTIVE SLEEP APNEA): ICD-10-CM

## 2018-10-22 NOTE — PROGRESS NOTES
14 DAY STM VISIT    Diagnostic AHI:   57.2 HST    Message left for patient to return call     Assessment: Pt meeting objective benchmarks.     Action plan: waiting for patient to return call.  and pt to have 30 day STM visit.    Device type: Auto-CPAP  PAP settings: CPAP min 5.0 cm  H20     CPAP max 15.0 cm  H20    95th% pressure 11.2 cm   Mask type:  Nasal Mask  Objective measures: 14 day rolling measures      Compliance  100 %      Leak  7.04 lpm  last  upload      AHI 1.53   last  upload      Average number of minutes 456     Average hours of usage 7.6          Objective measure goal  Compliance   Goal >70%  Leak   Goal < 24 lpm  AHI  Goal < 5  Usage  Goal >240

## 2018-10-23 NOTE — PROGRESS NOTES
Patient called and left a message for virtual care coordinator.  Coordinator called back and left another message requesting a call back again from patient.

## 2018-11-04 ENCOUNTER — DOCUMENTATION ONLY (OUTPATIENT)
Dept: SLEEP MEDICINE | Facility: CLINIC | Age: 58
End: 2018-11-04

## 2018-11-04 DIAGNOSIS — G47.33 OSA (OBSTRUCTIVE SLEEP APNEA): ICD-10-CM

## 2018-11-05 NOTE — PROGRESS NOTES
30 DAY STM VISIT    Diagnostic AHI:  57.2 HST    Message left for patient to return call     Assessment: Pt meeting objective benchmarks.     Action plan: waiting for patient to return call and pt to have 6 month STM visit  Patient has scheduled a follow up visit with Dr. Patricia on 12/6/18.   Device type: Auto-CPAP  PAP settings: CPAP min 5.0 cm  H20     CPAP max 15.0 cm  H20    95th% pressure 11.7 cm  H20   Mask type:  Nasal Mask  Objective measures: 14 day rolling measures      Compliance  100 %      Leak  14.96 lpm  last  upload      AHI 1.71   last  upload      Average number of minutes 429      Objective measure goal  Compliance   Goal >70%  Leak   Goal < 24 lpm  AHI  Goal < 5  Usage  Goal >240

## 2018-11-14 ENCOUNTER — OFFICE VISIT (OUTPATIENT)
Dept: INTERNAL MEDICINE | Facility: CLINIC | Age: 58
End: 2018-11-14
Payer: COMMERCIAL

## 2018-11-14 VITALS
HEART RATE: 86 BPM | BODY MASS INDEX: 27.19 KG/M2 | HEIGHT: 71 IN | WEIGHT: 194.2 LBS | SYSTOLIC BLOOD PRESSURE: 124 MMHG | RESPIRATION RATE: 18 BRPM | TEMPERATURE: 98 F | OXYGEN SATURATION: 99 % | DIASTOLIC BLOOD PRESSURE: 84 MMHG

## 2018-11-14 DIAGNOSIS — Z01.818 PREOP GENERAL PHYSICAL EXAM: Primary | ICD-10-CM

## 2018-11-14 DIAGNOSIS — G47.33 OSA (OBSTRUCTIVE SLEEP APNEA): ICD-10-CM

## 2018-11-14 DIAGNOSIS — I10 ESSENTIAL HYPERTENSION: ICD-10-CM

## 2018-11-14 DIAGNOSIS — M16.10 ARTHRITIS OF HIP: ICD-10-CM

## 2018-11-14 LAB
ALBUMIN UR-MCNC: NEGATIVE MG/DL
APPEARANCE UR: CLEAR
BILIRUB UR QL STRIP: NEGATIVE
COLOR UR AUTO: YELLOW
GLUCOSE UR STRIP-MCNC: NEGATIVE MG/DL
HGB BLD-MCNC: 12.8 G/DL (ref 13.3–17.7)
HGB UR QL STRIP: NEGATIVE
KETONES UR STRIP-MCNC: NEGATIVE MG/DL
LEUKOCYTE ESTERASE UR QL STRIP: NEGATIVE
NITRATE UR QL: NEGATIVE
PH UR STRIP: 5.5 PH (ref 5–7)
RBC #/AREA URNS AUTO: NORMAL /HPF
SOURCE: NORMAL
SP GR UR STRIP: 1 (ref 1–1.03)
UROBILINOGEN UR STRIP-ACNC: 0.2 EU/DL (ref 0.2–1)
WBC #/AREA URNS AUTO: NORMAL /HPF

## 2018-11-14 PROCEDURE — 93000 ELECTROCARDIOGRAM COMPLETE: CPT | Performed by: INTERNAL MEDICINE

## 2018-11-14 PROCEDURE — 99215 OFFICE O/P EST HI 40 MIN: CPT | Performed by: INTERNAL MEDICINE

## 2018-11-14 PROCEDURE — 85018 HEMOGLOBIN: CPT | Performed by: INTERNAL MEDICINE

## 2018-11-14 PROCEDURE — 80048 BASIC METABOLIC PNL TOTAL CA: CPT | Performed by: INTERNAL MEDICINE

## 2018-11-14 PROCEDURE — 36415 COLL VENOUS BLD VENIPUNCTURE: CPT | Performed by: INTERNAL MEDICINE

## 2018-11-14 PROCEDURE — 81001 URINALYSIS AUTO W/SCOPE: CPT | Performed by: INTERNAL MEDICINE

## 2018-11-14 NOTE — NURSING NOTE
"Vital signs:  Temp: 98  F (36.7  C) Temp src: Oral BP: 124/84 Pulse: 86   Resp: 18 SpO2: 99 %     Height: 5' 10.5\" (179.1 cm) Weight: 194 lb 3.2 oz (88.1 kg)  Estimated body mass index is 27.47 kg/(m^2) as calculated from the following:    Height as of this encounter: 5' 10.5\" (1.791 m).    Weight as of this encounter: 194 lb 3.2 oz (88.1 kg).          "

## 2018-11-14 NOTE — MR AVS SNAPSHOT
After Visit Summary   11/14/2018    Jeramie Sánchez    MRN: [id number]           Patient Information     Date Of Birth          1960        Visit Information        Provider Department      11/14/2018 3:00 PM Elsie Lopez MD [facility]        Today's Diagnoses     Preop general physical exam    -  1    Arthritis of hip        Essential hypertension        JASSON (obstructive sleep apnea)          Care Instructions      Before Your Surgery      Call your surgeon if there is any change in your health. This includes signs of a cold or flu (such as a sore throat, runny nose, cough, rash or fever).    Do not smoke, drink alcohol or take over the counter medicine (unless your surgeon or primary care doctor tells you to) for the 24 hours before and after surgery.    If you take prescribed drugs: Follow your doctor s orders about which medicines to take and which to stop until after surgery.    Eating and drinking prior to surgery: follow the instructions from your surgeon    Take a shower or bath the night before surgery. Use the soap your surgeon gave you to gently clean your skin. If you do not have soap from your surgeon, use your regular soap. Do not shave or scrub the surgery site.  Wear clean pajamas and have clean sheets on your bed.           Follow-ups after your visit        Your next 10 appointments already scheduled     Dec 06, 2018 10:00 AM CST   Return Sleep Patient with Jus Patricia MD   [facility] ([facility])    [address] [place] [id number] [phone]              Who to contact     If you have questions or need follow up information about today's clinic visit or your schedule please contact [facility] directly at [phone].  Normal or non-critical lab and imaging results will be communicated to you by MyChart, letter or phone within 4 business days after the clinic has  "received the results. If you do not hear from us within 7 days, please contact the clinic through [employer] or phone. If you have a critical or abnormal lab result, we will notify you by phone as soon as possible.  Submit refill requests through [employer] or call your pharmacy and they will forward the refill request to us. Please allow 3 business days for your refill to be completed.          Additional Information About Your Visit        Care EveryWhere ID     This is your Care EveryWhere ID. This could be used by other organizations to access your [facility] medical records  [id number]        Your Vitals Were     Pulse Temperature Respirations Height Pulse Oximetry BMI (Body Mass Index)    86 98  F (36.7  C) (Oral) 18 5' 10.5\" (1.791 m) 99% 27.47 kg/m2       Blood Pressure from Last 3 Encounters:   11/14/18 124/84   09/24/18 (!) 137/91   09/06/18 (!) 131/93    Weight from Last 3 Encounters:   11/14/18 194 lb 3.2 oz (88.1 kg)   09/24/18 185 lb 6.4 oz (84.1 kg)   09/06/18 190 lb 9.6 oz (86.5 kg)              We Performed the Following     Basic metabolic panel     EKG 12-lead complete w/read - Clinics     Hemoglobin     UA with Microscopic reflex to Culture        Primary Care Provider Fax #    Physician No Ref-Primary [phone]       No address on file        Equal Access to Services     ENDY JAMES : Hadii libia abbotto Somarisol, waaxda luqadaha, qaybta kaalmada suha collado . So [facility] [phone].    ATENCIÓN: Si habla español, tiene a gibson disposición servicios gratuitos de asistencia lingüística. Llame al [phone].    We comply with applicable federal civil rights laws and [place] laws. We do not discriminate on the basis of race, color, national origin, age, disability, sex, sexual orientation, or gender identity.            Thank you!     Thank you for choosing [facility]  for your care. Our goal is always to provide you with excellent care. " Hearing back from our patients is one way we can continue to improve our services. Please take a few minutes to complete the written survey that you may receive in the mail after your visit with us. Thank you!             Your Updated Medication List - Protect others around you: Learn how to safely use, store and throw away your medicines at www.disposemymeds.org.          This list is accurate as of 11/14/18 10:18 PM.  Always use your most recent med list.                   Brand Name Dispense Instructions for use Diagnosis    lisinopril-hydrochlorothiazide 20-25 MG per tablet    PRINZIDE/ZESTORETIC    90 tablet    Take 1 tablet by mouth daily    Benign essential hypertension       Multi-vitamin Tabs tablet      Take 1 tablet by mouth daily

## 2018-11-14 NOTE — PROGRESS NOTES
[place] [address]  [facility] [address]  [phone]  Dept: [phone]    PRE-OP EVALUATION:  Today's date: 2018    Jeramie Sánchez (: 1960) presents for pre-operative evaluation assessment as requested by Dr. Rene.  He requires evaluation and anesthesia risk assessment prior to undergoing surgery/procedure for treatment of right hip arthritis  .    Proposed Surgery/ Procedure: Right hip replacement  Date of Surgery/ Procedure: 12/3/18  Time of Surgery/ Procedure: 2 pm   Hospital/Surgical Facility: [facility]  Fax number for surgical facility: [phone]  Primary Physician: No Ref-Primary, Physician  Type of Anesthesia Anticipated: General    Patient has a Health Care Directive or Living Will:  NO    1. NO - Do you have a history of heart attack, stroke, stent, bypass or surgery on an artery in the head, neck, heart or legs?  2. NO - Do you ever have any pain or discomfort in your chest?  3. NO - Do you have a history of  Heart Failure?  4. NO - Are you troubled by shortness of breath when: walking on the level, up a slight hill or at night?  5. NO - Do you currently have a cold, bronchitis or other respiratory infection?  6. NO - Do you have a cough, shortness of breath or wheezing?  7. NO - Do you sometimes get pains in the calves of your legs when you walk?  8. NO - Do you or anyone in your family have previous history of blood clots?  9. NO - Do you or does anyone in your family have a serious bleeding problem such as prolonged bleeding following surgeries or cuts?  10. NO - Have you ever had problems with anemia or been told to take iron pills?  11. NO - Have you had any abnormal blood loss such as black, tarry or bloody stools, or abnormal vaginal bleeding?  12. NO - Have you ever had a blood transfusion?  13. NO - Have you or any of your relatives ever had problems with anesthesia?  14. Yes - Do you have sleep apnea, excessive  snoring or daytime drowsiness?  15. NO - Do you have any prosthetic heart valves?  16. Yes - Do you have prosthetic joints? Lt hip  17. NO - Is there any chance that you may be pregnant?      HPI:       HYPERTENSION - Patient has longstanding history of HTN , currently denies any symptoms referable to elevated blood pressure. Specifically denies chest pain, palpitations, dyspnea, orthopnea, PND or peripheral edema. Blood pressure readings have been in normal range. Current medication regimen is as listed below. Patient denies any side effects of medication.                                                                                                                                                                                          .  Obstructive Sleep apnea  - Patient has a history sleep apnea and on CPAP.                                                                                                                                      .  Arthritis  has had Lt hip replacement and getting Rt hip replacement       MEDICAL HISTORY:     Patient Active Problem List    Diagnosis Date Noted     JASSON (obstructive sleep apnea) 09/18/2018     Priority: Medium     HST, 9/17/2018, AHI: 57        Essential hypertension 01/05/2016     Priority: Medium     Hip pain 11/25/2014     Priority: Medium        Past Medical History:   Diagnosis Date     Arthritis      Hypertension        Past Surgical History:   Procedure Laterality Date     ARTHROPLASTY HIP ANTERIOR Left 11/25/2014    Procedure: ARTHROPLASTY HIP ANTERIOR;  Surgeon: Drew Phelps MD;  Location: [facility] OR     ENT SURGERY      jaw repair for dental reasons       Current Outpatient Prescriptions   Medication Sig Dispense Refill     lisinopril-hydrochlorothiazide (PRINZIDE/ZESTORETIC) 20-25 MG per tablet Take 1 tablet by mouth daily 90 tablet 3     multivitamin, therapeutic with minerals (MULTI-VITAMIN) TABS Take 1 tablet by mouth daily       OTC products: None,  "except as noted above    No Known Allergies   Latex Allergy: NO    Social History   Substance Use Topics     Smoking status: Never Smoker     Smokeless tobacco: Never Used     Alcohol use Yes      Comment: 3 on weekends     History   Drug Use No       REVIEW OF SYSTEMS:   CONSTITUTIONAL: NEGATIVE for fever, chills, change in weight  INTEGUMENTARY/SKIN: NEGATIVE for worrisome rashes, moles or lesions  EYES: NEGATIVE for vision changes or irritation  ENT/MOUTH: NEGATIVE for ear, mouth and throat problems  RESP: NEGATIVE for significant cough or SOB  BREAST: NEGATIVE for masses, tenderness or discharge  CV: NEGATIVE for chest pain, palpitations or peripheral edema  GI: NEGATIVE for nausea, abdominal pain, heartburn, or change in bowel habits  : NEGATIVE for frequency, dysuria, or hematuria  MUSCULOSKELETAL: NEGATIVE for significant arthralgias or myalgia  NEURO: NEGATIVE for weakness, dizziness or paresthesias  ENDOCRINE: NEGATIVE for temperature intolerance, skin/hair changes  HEME: NEGATIVE for bleeding problems  PSYCHIATRIC: NEGATIVE for changes in mood or affect    EXAM:   /84 (BP Location: Right arm, Patient Position: Sitting, Cuff Size: Adult Large)  Pulse 86  Temp 98  F (36.7  C) (Oral)  Resp 18  Ht 5' 10.5\" (1.791 m)  Wt 194 lb 3.2 oz (88.1 kg)  SpO2 99%  BMI 27.47 kg/m2    GENERAL APPEARANCE: healthy, alert and no distress     EYES: EOMI,  PERRL     HENT: ear canals and TM's normal and nose and mouth without ulcers or lesions     NECK: no adenopathy, no asymmetry, masses, or scars and thyroid normal to palpation     RESP: lungs clear to auscultation - no rales, rhonchi or wheezes     CV: regular rates and rhythm, normal S1 S2, no S3 or S4 and no murmur, click or rub     ABDOMEN:  soft, nontender, no HSM or masses and bowel sounds normal     MS: extremities normal- no gross deformities noted, no evidence of inflammation in joints, FROM in all extremities.     NEURO: Normal strength and tone, " sensory exam grossly normal, mentation intact and speech normal     PSYCH: mentation appears normal. and affect normal/bright     LYMPHATICS: No cervical adenopathy    DIAGNOSTICS:   EKG: sinus rhythm, , normal axis, normal intervals, voltage criteria for LVH.  unchanged from previous tracings  Hemoglobin- 12.8  Serum Potassium pending   UA ; negative      Recent Labs   Lab Test  10/17/18   0940  02/14/18   0848  11/26/14   0646  11/25/14   0628   HGB  13.1*  14.9  11.3*  13.4   PLT   --   175   --   235   NA   --   139  141   --    POTASSIUM   --   3.9  4.0  3.9   CR   --   0.97  0.80  0.94        IMPRESSION:      (Z01.818) Preop general physical exam  (primary encounter diagnosis)  Plan:Right hip replacement . EKG 12-lead complete w/read - Clinics,         Hemoglobin, Basic metabolic panel, UA with         Microscopic reflex to Culture           (M16.10) Arthritis of hip  Plan: Right hip replacement .    (I10) Essential hypertension  Plan: BP controlled , continue Lisinopril/Hctz    (G47.33) JASSON (obstructive sleep apnea)  Plan: on CPAP      The proposed surgical procedure is considered INTERMEDIATE risk.    REVISED CARDIAC RISK INDEX  The patient has the following serious cardiovascular risks for perioperative complications such as (MI, PE, VFib and 3  AV Block):  No serious cardiac risks    The patient has the following additional risks for perioperative complications:  No identified additional risks      ICD-10-CM    1. Preop general physical exam Z01.818 EKG 12-lead complete w/read - Clinics     Hemoglobin     Basic metabolic panel     UA with Microscopic reflex to Culture   2. Arthritis of hip M16.10    3. Essential hypertension I10    4. JASSON (obstructive sleep apnea) G47.33        RECOMMENDATIONS:       Obstructive Sleep Apnea (or suspected sleep apnea)  Patient is to bring their home CPAP with them on the day of surgery    Anticoagulant or Antiplatelet Medication Use  ASPIRIN: Discontinue ASA 7- days prior  to procedure to reduce bleeding risk.    NSAIDS: Stop  3 days prior to surgery        ACE Inhibitor or Angiotensin Receptor Blocker (ARB) Use  Ace inhibitor or Angiotensin Receptor Blocker (ARB) and should HOLD this medication for the 24 hours prior to surgery.      APPROVAL GIVEN to proceed with proposed procedure, without further diagnostic evaluation       Signed Electronically by: Elsie Lopez MD    Copy of this evaluation report is provided to requesting physician.    [place] Preop Guidelines    Revised Cardiac Risk Index

## 2018-11-15 LAB
ANION GAP SERPL CALCULATED.3IONS-SCNC: 8 MMOL/L (ref 3–14)
BUN SERPL-MCNC: 14 MG/DL (ref 7–30)
CALCIUM SERPL-MCNC: 9 MG/DL (ref 8.5–10.1)
CHLORIDE SERPL-SCNC: 101 MMOL/L (ref 94–109)
CO2 SERPL-SCNC: 30 MMOL/L (ref 20–32)
CREAT SERPL-MCNC: 1.13 MG/DL (ref 0.66–1.25)
GFR SERPL CREATININE-BSD FRML MDRD: 66 ML/MIN/1.7M2
GLUCOSE SERPL-MCNC: 88 MG/DL (ref 70–99)
POTASSIUM SERPL-SCNC: 3.5 MMOL/L (ref 3.4–5.3)
SODIUM SERPL-SCNC: 139 MMOL/L (ref 133–144)

## 2018-12-06 ENCOUNTER — OFFICE VISIT (OUTPATIENT)
Dept: SLEEP MEDICINE | Facility: CLINIC | Age: 58
End: 2018-12-06
Payer: COMMERCIAL

## 2018-12-06 VITALS
OXYGEN SATURATION: 100 % | WEIGHT: 192.2 LBS | HEART RATE: 84 BPM | HEIGHT: 71 IN | RESPIRATION RATE: 16 BRPM | SYSTOLIC BLOOD PRESSURE: 128 MMHG | BODY MASS INDEX: 26.91 KG/M2 | DIASTOLIC BLOOD PRESSURE: 73 MMHG

## 2018-12-06 DIAGNOSIS — G47.33 OSA (OBSTRUCTIVE SLEEP APNEA): ICD-10-CM

## 2018-12-06 PROCEDURE — 99213 OFFICE O/P EST LOW 20 MIN: CPT | Performed by: INTERNAL MEDICINE

## 2018-12-06 NOTE — NURSING NOTE
"Chief Complaint   Patient presents with     CPAP Follow Up       Initial /80  Pulse 70  Resp 16  Ht 1.702 m (5' 7\")  Wt 85.7 kg (189 lb)  SpO2 97%  BMI 29.6 kg/m2 Estimated body mass index is 29.6 kg/(m^2) as calculated from the following:    Height as of this encounter: 1.702 m (5' 7\").    Weight as of this encounter: 85.7 kg (189 lb).    Medication Reconciliation: complete     ESS 0  Neck 35cm  Rani Shane        "

## 2018-12-06 NOTE — PATIENT INSTRUCTIONS
Your Body mass index is 27.19 kg/(m^2).  Weight management is a personal decision.  If you are interested in exploring weight loss strategies, the following discussion covers the approaches that may be successful. Body mass index (BMI) is one way to tell whether you are at a healthy weight, overweight, or obese. It measures your weight in relation to your height.  A BMI of 18.5 to 24.9 is in the healthy range. A person with a BMI of 25 to 29.9 is considered overweight, and someone with a BMI of 30 or greater is considered obese. More than two-thirds of American adults are considered overweight or obese.  Being overweight or obese increases the risk for further weight gain. Excess weight may lead to heart disease and diabetes.  Creating and following plans for healthy eating and physical activity may help you improve your health.  Weight control is part of healthy lifestyle and includes exercise, emotional health, and healthy eating habits. Careful eating habits lifelong are the mainstay of weight control. Though there are significant health benefits from weight loss, long-term weight loss with diet alone may be very difficult to achieve- studies show long-term success with dietary management in less than 10% of people. Attaining a healthy weight may be especially difficult to achieve in those with severe obesity. In some cases, medications, devices and surgical management might be considered.  What can you do?  If you are overweight or obese and are interested in methods for weight loss, you should discuss this with your provider.     Consider reducing daily calorie intake by 500 calories.     Keep a food journal.     Avoiding skipping meals, consider cutting portions instead.    Diet combined with exercise helps maintain muscle while optimizing fat loss. Strength training is particularly important for building and maintaining muscle mass. Exercise helps reduce stress, increase energy, and improves fitness.  Increasing exercise without diet control, however, may not burn enough calories to loose weight.       Start walking three days a week 10-20 minutes at a time    Work towards walking thirty minutes five days a week     Eventually, increase the speed of your walking for 1-2 minutes at time    In addition, we recommend that you review healthy lifestyles and methods for weight loss available through the National Institutes of Health patient information sites:  http://win.niddk.nih.gov/publications/index.htm    And look into health and wellness programs that may be available through your health insurance provider, employer, local community center, or tito club.    Weight management plan: Patient was referred to their PCP to discuss a diet and exercise plan.

## 2018-12-06 NOTE — PROGRESS NOTES
"    Obstructive Sleep Apnea - PAP Follow-Up Visit:    Chief Complaint   Patient presents with     CPAP Follow Up       Jeramie Sánchez comes in today for follow-up of their severe sleep apnea, managed with CPAP.     HST on 9/17/2018 showed AHI of 57.2 per hour.     Overall, he rates the experience with PAP as 10 (0 poor, 10 great). The mask is comfortable. The mask is not leaking.  He is not snoring with the mask on. He is not having gasp arousals.  He is not having significant oral/nasal dryness. The pressure settings are comfortable.     He uses nasal pillows.     Bedtime is typically 10:30 pm. Usually it takes about 10 minutes to fall asleep with the mask on. Wake time is typically 5:30 am.  Patient is using PAP therapy 7 hours per night. The patient is usually getting 7 hours of sleep per night.    He does feel rested in the morning.    Total score - [place]: 0 (12/6/2018 10:08 AM)    [employer]Med     Auto-PAP 5-15 cmH2O download:  30/30 total days of use. 0 nonuse days. 100% days with >4 hours use.  Average use 7 hours 10 minutes per day. Median Leak 2.0 L/min. 95%ile Leak 20.9 L/min. CPAP 95% pressure 11.7cm. AHI 1.4      Reviewed by team: Tobacco  Allergies       Reviewed by provider:        Problem List:  Patient Active Problem List    Diagnosis Date Noted     JASSON (obstructive sleep apnea) 09/18/2018     Priority: Medium     HST, 9/17/2018, AHI: 57        Essential hypertension 01/05/2016     Priority: Medium     Hip pain 11/25/2014     Priority: Medium          /73  Pulse 84  Resp 16  Ht 1.791 m (5' 10.5\")  Wt 87.2 kg (192 lb 3.2 oz)  SpO2 100%  BMI 27.19 kg/m2    Impression/Plan:     Severe sleep apnea.     - Tolerating PAP well. Daytime symptoms are stable. Regular compliance and normal AHI is demonstrated on download.     Plan:     1. Continue auto PAP therapy     Jeramie Sánchez will follow up in about 1 year(s).     Fifteen minutes spent with patient, all of which were spent face-to-face " counseling, consulting, coordinating plan of care.      Jus Patricia MD, MD    CC:  No Ref-Primary, Physician,

## 2018-12-06 NOTE — MR AVS SNAPSHOT
After Visit Summary   12/6/2018    Jeramie Sánchez    MRN: [id number]           Patient Information     Date Of Birth          1960        Visit Information        Provider Department      12/6/2018 10:00 AM Jus Patricia MD [facility]        Today's Diagnoses     JASSON (obstructive sleep apnea)          Care Instructions        Your Body mass index is 27.19 kg/(m^2).  Weight management is a personal decision.  If you are interested in exploring weight loss strategies, the following discussion covers the approaches that may be successful. Body mass index (BMI) is one way to tell whether you are at a healthy weight, overweight, or obese. It measures your weight in relation to your height.  A BMI of 18.5 to 24.9 is in the healthy range. A person with a BMI of 25 to 29.9 is considered overweight, and someone with a BMI of 30 or greater is considered obese. More than two-thirds of American adults are considered overweight or obese.  Being overweight or obese increases the risk for further weight gain. Excess weight may lead to heart disease and diabetes.  Creating and following plans for healthy eating and physical activity may help you improve your health.  Weight control is part of healthy lifestyle and includes exercise, emotional health, and healthy eating habits. Careful eating habits lifelong are the mainstay of weight control. Though there are significant health benefits from weight loss, long-term weight loss with diet alone may be very difficult to achieve- studies show long-term success with dietary management in less than 10% of people. Attaining a healthy weight may be especially difficult to achieve in those with severe obesity. In some cases, medications, devices and surgical management might be considered.  What can you do?  If you are overweight or obese and are interested in methods for weight loss, you should discuss this with your provider.     Consider reducing  daily calorie intake by 500 calories.     Keep a food journal.     Avoiding skipping meals, consider cutting portions instead.    Diet combined with exercise helps maintain muscle while optimizing fat loss. Strength training is particularly important for building and maintaining muscle mass. Exercise helps reduce stress, increase energy, and improves fitness. Increasing exercise without diet control, however, may not burn enough calories to loose weight.       Start walking three days a week 10-20 minutes at a time    Work towards walking thirty minutes five days a week     Eventually, increase the speed of your walking for 1-2 minutes at time    In addition, we recommend that you review healthy lifestyles and methods for weight loss available through the National Institutes of Health patient information sites:  http://win.niddk.nih.gov/publications/index.htm    And look into health and wellness programs that may be available through your health insurance provider, employer, local community center, or tito club.    Weight management plan: Patient was referred to their PCP to discuss a diet and exercise plan.            Follow-ups after your visit        Your next 10 appointments already scheduled     Dec 06, 2019 11:30 AM CST   Return Sleep Patient with Jus Patricia MD   [facility]    [address] [place] [address] [id number] [phone]              Who to contact     If you have questions or need follow up information about today's clinic visit or your schedule please contact [facility] directly at [phone].  Normal or non-critical lab and imaging results will be communicated to you by MyChart, letter or phone within 4 business days after the clinic has received the results. If you do not hear from us within 7 days, please contact the clinic through MyChart or phone. If you have a critical or abnormal lab result, we will  "notify you by phone as soon as possible.  Submit refill requests through [facility] or call your pharmacy and they will forward the refill request to us. Please allow 3 business days for your refill to be completed.          Additional Information About Your Visit        Care EveryWhere ID     This is your Care EveryWhere ID. This could be used by other organizations to access your [facility] medical records  [id number]        Your Vitals Were     Pulse Respirations Height Pulse Oximetry BMI (Body Mass Index)       84 16 1.791 m (5' 10.5\") 100% 27.19 kg/m2        Blood Pressure from Last 3 Encounters:   12/06/18 128/73   11/14/18 124/84   09/24/18 (!) 137/91    Weight from Last 3 Encounters:   12/06/18 87.2 kg (192 lb 3.2 oz)   11/14/18 88.1 kg (194 lb 3.2 oz)   09/24/18 84.1 kg (185 lb 6.4 oz)              Today, you had the following     No orders found for display       Primary Care Provider Fax #    Physician No Ref-Primary [phone]       No address on file        Equal Access to Services     [facility] Center[facility] : Hadii libia Malik, wafranciscoda ludavid, qaybta kaalmasharmaine collado, suha reid . So [facility] [phone].    ATENCIÓN: Si habla español, tiene a gibson disposición servicios gratuitos de asistencia lingüística. Arturo al [phone].    We comply with applicable federal civil rights laws and [place] laws. We do not discriminate on the basis of race, color, national origin, age, disability, sex, sexual orientation, or gender identity.            Thank you!     Thank you for choosing [facility]  for your care. Our goal is always to provide you with excellent care. Hearing back from our patients is one way we can continue to improve our services. Please take a few minutes to complete the written survey that you may receive in the mail after your visit with us. Thank you!             Your Updated Medication List - Protect others around you: Learn how to safely " use, store and throw away your medicines at www.disposemymeds.org.          This list is accurate as of 12/6/18 10:32 AM.  Always use your most recent med list.                   Brand Name Dispense Instructions for use Diagnosis    lisinopril-hydrochlorothiazide 20-25 MG tablet    PRINZIDE/ZESTORETIC    90 tablet    Take 1 tablet by mouth daily    Benign essential hypertension       Multi-vitamin tablet      Take 1 tablet by mouth daily

## 2019-01-17 ENCOUNTER — HOSPITAL ENCOUNTER (EMERGENCY)
Facility: CLINIC | Age: 59
Discharge: HOME | End: 2019-01-17
Attending: EMERGENCY MEDICINE | Admitting: EMERGENCY MEDICINE
Payer: COMMERCIAL

## 2019-01-17 ENCOUNTER — APPOINTMENT (OUTPATIENT)
Dept: ULTRASOUND IMAGING | Facility: CLINIC | Age: 59
End: 2019-01-17
Payer: COMMERCIAL

## 2019-01-17 VITALS
DIASTOLIC BLOOD PRESSURE: 74 MMHG | OXYGEN SATURATION: 100 % | TEMPERATURE: 97.5 F | RESPIRATION RATE: 16 BRPM | SYSTOLIC BLOOD PRESSURE: 116 MMHG | HEIGHT: 71 IN | BODY MASS INDEX: 27.02 KG/M2 | WEIGHT: 193 LBS

## 2019-01-17 DIAGNOSIS — M25.469 KNEE SWELLING: ICD-10-CM

## 2019-01-17 LAB
ANION GAP SERPL CALCULATED.3IONS-SCNC: 9 MMOL/L (ref 3–14)
BASOPHILS # BLD AUTO: 0 10E9/L (ref 0–0.2)
BASOPHILS NFR BLD AUTO: 0.4 %
BUN SERPL-MCNC: 17 MG/DL (ref 7–30)
CALCIUM SERPL-MCNC: 8.5 MG/DL (ref 8.5–10.1)
CHLORIDE SERPL-SCNC: 106 MMOL/L (ref 94–109)
CO2 SERPL-SCNC: 27 MMOL/L (ref 20–32)
CREAT SERPL-MCNC: 1.22 MG/DL (ref 0.66–1.25)
DIFFERENTIAL METHOD BLD: ABNORMAL
EOSINOPHIL # BLD AUTO: 0.3 10E9/L (ref 0–0.7)
EOSINOPHIL NFR BLD AUTO: 4.5 %
ERYTHROCYTE [DISTWIDTH] IN BLOOD BY AUTOMATED COUNT: 13.6 % (ref 10–15)
GFR SERPL CREATININE-BSD FRML MDRD: 64 ML/MIN/{1.73_M2}
GLUCOSE SERPL-MCNC: 97 MG/DL (ref 70–99)
HCT VFR BLD AUTO: 32.5 % (ref 40–53)
HGB BLD-MCNC: 10.6 G/DL (ref 13.3–17.7)
IMM GRANULOCYTES # BLD: 0 10E9/L (ref 0–0.4)
IMM GRANULOCYTES NFR BLD: 0.1 %
LYMPHOCYTES # BLD AUTO: 1.3 10E9/L (ref 0.8–5.3)
LYMPHOCYTES NFR BLD AUTO: 18.9 %
MCH RBC QN AUTO: 31.8 PG (ref 26.5–33)
MCHC RBC AUTO-ENTMCNC: 32.6 G/DL (ref 31.5–36.5)
MCV RBC AUTO: 98 FL (ref 78–100)
MONOCYTES # BLD AUTO: 0.7 10E9/L (ref 0–1.3)
MONOCYTES NFR BLD AUTO: 10 %
NEUTROPHILS # BLD AUTO: 4.4 10E9/L (ref 1.6–8.3)
NEUTROPHILS NFR BLD AUTO: 66.1 %
NRBC # BLD AUTO: 0 10*3/UL
NRBC BLD AUTO-RTO: 0 /100
PLATELET # BLD AUTO: 321 10E9/L (ref 150–450)
POTASSIUM SERPL-SCNC: 3.9 MMOL/L (ref 3.4–5.3)
RBC # BLD AUTO: 3.33 10E12/L (ref 4.4–5.9)
SODIUM SERPL-SCNC: 142 MMOL/L (ref 133–144)
WBC # BLD AUTO: 6.7 10E9/L (ref 4–11)

## 2019-01-17 PROCEDURE — 99284 EMERGENCY DEPT VISIT MOD MDM: CPT | Mod: 25

## 2019-01-17 PROCEDURE — 85025 COMPLETE CBC W/AUTO DIFF WBC: CPT | Performed by: EMERGENCY MEDICINE

## 2019-01-17 PROCEDURE — 80048 BASIC METABOLIC PNL TOTAL CA: CPT | Performed by: EMERGENCY MEDICINE

## 2019-01-17 PROCEDURE — 93971 EXTREMITY STUDY: CPT | Mod: RT

## 2019-01-17 ASSESSMENT — MIFFLIN-ST. JEOR: SCORE: 1712.57

## 2019-01-17 NOTE — ED AVS SNAPSHOT
Emergency Department  [address] [place] [id number]  Phone:  [phone]  Fax:  [phone]                                    Jeramie Sánchez   MRN: [id number]    Department:   Emergency Department   Date of Visit:  1/17/2019           After Visit Summary Signature Page    I have received my discharge instructions, and my questions have been answered. I have discussed any challenges I see with this plan with the nurse or doctor.    ..........................................................................................................................................  Patient/Patient Representative Signature      ..........................................................................................................................................  Patient Representative Print Name and Relationship to Patient    ..................................................               ................................................  Date                                   Time    ..........................................................................................................................................  Reviewed by Signature/Title    ...................................................              ..............................................  Date                                               Time          22EPIC Rev 08/18

## 2019-01-18 NOTE — ED PROVIDER NOTES
"  History     Chief Complaint:  Leg Swelling       HPI   Jeramie Sánchez is a 58 year old male who presents with his daughter to the Emergency Department for evaluation of leg swelling. The patient reports had a right hip surgery on December 3rd. He reports having twisted his ankle 1 week ago while wearing work boots, with later development of right lower leg swelling. Today, the patient's daughter noticed bruising at his right ankle. The patient notes having right lower leg pain, which he describes feeling like a muscle spasm. He denies any shortness of breath.       Allergies:  No Known Drug Allergies     Medications:    lisinopril-hydrochlorothiazide     Past Medical History:    Arthritis   Hypertension       Past Surgical History:    ENT SURGERY   ARTHROPLASTY HIP ANTERIOR    Family History:    Heart Surgery    Social History:  Marital Status:    The patient was accompanied to the ED by his daughter.  Smoking Status: Never  Smokeless Tobacco: Never  Alcohol Use: Yes-3 drinks on weekends     Review of Systems   Cardiovascular: Positive for leg swelling.   Musculoskeletal:        Right lower leg pain   Skin:        Bruise on right lower leg    All other systems reviewed and are negative.      Physical Exam   First Vitals:  BP: 152/82  Heart Rate: 83  Temp: 97.5  F (36.4  C)  Resp: 16  Height: 180.3 cm (5' 11\")  Weight: 87.5 kg (193 lb)  SpO2: 100 %      Physical Exam    General: Resting on the gurney.   Head:  The scalp, face, and head appear normal  Mouth/Throat: Mucus membranes are moist  CV:  Regular rate    Normal S1 and S2  No pathological murmur   Resp:  Breath sounds clear and equal bilaterally    Non-labored, no retractions or accessory muscle use    No coarseness    No wheezing   GI:  Abdomen is soft, no rigidity    No tenderness to palpation  MS:  Normal motor assessment of all extremities.    Good capillary refill noted.    Right lower extremity with edema and some bruising the dependent area. " Fullness in the posterior knee.     Tenderness to palpitation of the entire calf       Skin:   No rash or lesions noted.  Neuro:   Speech is normal and fluent. No apparent deficit.  Psych: Awake. Alert.  Normal affect.      Appropriate interactions.      Emergency Department Course     Imaging:  Radiology findings were communicated with the patient who voiced understanding of the findings.    US Lower Extremity Venous Duplex Right  1. No DVT identified right leg.   2. Small complex area in the right popliteal fossa likely a complex  Baker's cyst or possibly hematoma.  Report per radiology      Laboratory:  Laboratory findings were communicated with the patient who voiced understanding of the findings.    BMP: AWNL (Creatinine 1.22)     CBC: WBC 6.7 (L) , HGB 10.6 (L) o/w WNL. ()        Emergency Department Course:  The patient was sent for a US while in the emergency department, results above.   IV was inserted and blood was drawn for laboratory testing, results above.    Nursing notes and vitals reviewed.  2032: I performed an exam of the patient as documented above.     2215: Patient rechecked and updated.     Findings and plan explained to the Patient. Patient discharged home with instructions regarding supportive care, medications, and reasons to return. The importance of close follow-up was reviewed.      Impression & Plan      Medical Decision Making:  Jeramie Sánchez is a 58 year old male who presents for evaluation of calf swelling and pain. A broad differential was considered including DVT, cellulitis, Baker's cyst rupture, Baker's cyst, hematoma, rupture, compartment syndrome, muscle rupture, sprain, superficial thrombophlebitis, compression of the venous structures higher up in the abdomen and or leg. The symptoms here are consistent with a Baker's cyst hs hematoma. There are no signs of compartment syndrome or other worrisome etiologies at this point so outpatient management is indicated. They  will elevate leg, do gentle dorsal flexion and plantar flexion motions, take Tylenol for pain, and avoid strenuous activity. They should followup with his primary care  Or ortho doctor in 3 days.     Critical Care time:  none    Diagnosis:    ICD-10-CM    1. Knee swelling M25.469     bakers cyst vs hematoma       Disposition:  discharged to home    Lisa Stephens  1/17/2019    EMERGENCY DEPARTMENT    Scribe Disclosure:  I, Lisajoe Stephens, am serving as a scribe at 8:32 PM on 1/17/2019 to document services personally performed by Monika Jason MD based on my observations and the provider's statements to me.        Monika Jason MD  01/18/19 2130

## 2019-02-20 DIAGNOSIS — I10 BENIGN ESSENTIAL HYPERTENSION: ICD-10-CM

## 2019-02-20 NOTE — TELEPHONE ENCOUNTER
"Requested Prescriptions   Pending Prescriptions Disp Refills     lisinopril-hydrochlorothiazide (PRINZIDE/ZESTORETIC) 20-25 MG tablet [Pharmacy Med Name: LISINOPRIL-HCTZ 20/25MG TABLETS]  Last Written Prescription Date:  2/14/2018  Last Fill Quantity: 90,  # refills: 3   Last office visit: 11/14/2018 with prescribing provider:     Future Office Visit:   90 tablet 0     Sig: TAKE 1 TABLET BY MOUTH DAILY    Diuretics (Including Combos) Protocol Passed - 2/20/2019  3:17 AM       Passed - Blood pressure under 140/90 in past 12 months    BP Readings from Last 3 Encounters:   01/17/19 116/74   12/06/18 128/73   11/14/18 124/84                Passed - Recent (12 mo) or future (30 days) visit within the authorizing provider's specialty    Patient had office visit in the last 12 months or has a visit in the next 30 days with authorizing provider or within the authorizing provider's specialty.  See \"Patient Info\" tab in inbasket, or \"Choose Columns\" in Meds & Orders section of the refill encounter.             Passed - Medication is active on med list       Passed - Patient is age 18 or older       Passed - Normal serum creatinine on file in past 12 months    Recent Labs   Lab Test 01/17/19  2100   CR 1.22             Passed - Normal serum potassium on file in past 12 months    Recent Labs   Lab Test 01/17/19  2100   POTASSIUM 3.9                   Passed - Normal serum sodium on file in past 12 months    Recent Labs   Lab Test 01/17/19  2100                 "

## 2019-02-21 RX ORDER — LISINOPRIL AND HYDROCHLOROTHIAZIDE 20; 25 MG/1; MG/1
1 TABLET ORAL DAILY
Qty: 90 TABLET | Refills: 3 | Status: SHIPPED | OUTPATIENT
Start: 2019-02-21 | End: 2020-02-17

## 2019-03-15 ENCOUNTER — OFFICE VISIT (OUTPATIENT)
Dept: INTERNAL MEDICINE | Facility: CLINIC | Age: 59
End: 2019-03-15
Payer: COMMERCIAL

## 2019-03-15 ENCOUNTER — TELEPHONE (OUTPATIENT)
Dept: OTHER | Facility: CLINIC | Age: 59
End: 2019-03-15

## 2019-03-15 VITALS
BODY MASS INDEX: 26.6 KG/M2 | WEIGHT: 190 LBS | HEART RATE: 70 BPM | HEIGHT: 71 IN | TEMPERATURE: 98.2 F | DIASTOLIC BLOOD PRESSURE: 78 MMHG | RESPIRATION RATE: 22 BRPM | SYSTOLIC BLOOD PRESSURE: 122 MMHG | OXYGEN SATURATION: 98 %

## 2019-03-15 DIAGNOSIS — I83.813 VARICOSE VEINS OF BOTH LOWER EXTREMITIES WITH PAIN: Primary | ICD-10-CM

## 2019-03-15 DIAGNOSIS — K42.9 UMBILICAL HERNIA WITHOUT OBSTRUCTION AND WITHOUT GANGRENE: ICD-10-CM

## 2019-03-15 PROCEDURE — 99213 OFFICE O/P EST LOW 20 MIN: CPT | Performed by: NURSE PRACTITIONER

## 2019-03-15 ASSESSMENT — MIFFLIN-ST. JEOR: SCORE: 1691.02

## 2019-03-15 NOTE — PROGRESS NOTES
".  SUBJECTIVE:   Jeramie Sánchez is a 58 year old male who presents to clinic today for the following health issues:  Chief Complaint   Patient presents with     Hernia     pt c/o umbilical hernia has had for a while. intermittent pain and bulging . should he be referred?  Causes pain some time,  and and seems to be growing over time      Varicose Vein both      pt wants referral for discussion of what can be done for them as they are bothering him much more    Right behind knee cluster and some behind left knee and down     Ringing in ear - left side   Loud noise in concerts in past     Problem list and histories reviewed & adjusted, as indicated.  Additional history: as documented    Patient Active Problem List   Diagnosis     Hip pain     Essential hypertension     JASSON (obstructive sleep apnea)     Past Surgical History:   Procedure Laterality Date     ARTHROPLASTY HIP ANTERIOR Left 11/25/2014    Procedure: ARTHROPLASTY HIP ANTERIOR;  Surgeon: Drew Phelps MD;  Location: [facility] OR     ENT SURGERY      jaw repair for dental reasons       Social History     Tobacco Use     Smoking status: Never Smoker     Smokeless tobacco: Never Used   Substance Use Topics     Alcohol use: Yes     Comment: 3 on weekends     Family History   Problem Relation Age of Onset     Other Cancer Mother      Heart Surgery Father      Diabetes No family hx of      Colon Cancer No family hx of            Reviewed and updated as needed this visit by clinical staff  Tobacco  Med Hx  Surg Hx  Fam Hx  Soc Hx      Reviewed and updated as needed this visit by Provider       ROS:  Constitutional, HEENT, cardiovascular, pulmonary, gi and gu systems are negative, except as otherwise noted.    OBJECTIVE:     /78   Pulse 70   Temp 98.2  F (36.8  C) (Oral)   Resp 22   Ht 1.791 m (5' 10.5\")   Wt 86.2 kg (190 lb)   SpO2 98%   BMI 26.88 kg/m    Body mass index is 26.88 kg/m .  GENERAL:  alert and no distress  HENT: ear canals with " cerumen bilaterally- ear wash done with clearing   He will let us know if ear ringing is not improved with ear wash   RESP: lungs clear to auscultation - no rales, rhonchi or wheezes  CV: regular rate and rhythm  ABDOMEN: soft, nontender,  and bowel sounds normal- has umbilical hernia that is reducible, but somewhat uncomfortable at times  MS: no gross musculoskeletal defects noted, no edema  SKIN: has large cluster of varicose veins behind right knee and down leg- left leg with smaller cluster of varicose veins behind left knee and down leg less than on right leg   PSYCH: mentation appears normal, affect normal/bright    Diagnostic Test Results:  none     ASSESSMENT/PLAN:     1. Varicose veins of both lower extremities with pain  - VASCULAR SURGERY REFERRAL    2. Umbilical hernia without obstruction and without gangrene  - GENERAL SURG ADULT REFERRAL    CONSULTATION/REFERRAL to    FMG: [facility] [place]  Vascular  Services    IRENE Fish CNP  [facility]

## 2019-03-15 NOTE — PATIENT INSTRUCTIONS
FM[facility]: [facility] [place] [phone]   [email or web address]  Call for an appointment for umbilical hernia     Vascular  Services [phone] - Varicose Veins & None - Please Order Appropriate Testing   [email or web address]  They will call you to set up or you can call them

## 2019-03-15 NOTE — TELEPHONE ENCOUNTER
"Referral received via EPIC \"in box\", per  guidelines referral forwarded to [facility].     Danii Mauricio, RAMONAN, RN    "

## 2019-03-15 NOTE — NURSING NOTE
"Chief Complaint   Patient presents with     Hernia     pt c/o possible inguinal hernia has had for a while. intermittent pain and bulging . should he be referred?     Varicose Vein     pt wants referral     initial /78   Pulse 70   Temp 98.2  F (36.8  C) (Oral)   Resp 22   Ht 1.791 m (5' 10.5\")   Wt 86.2 kg (190 lb)   SpO2 98%   BMI 26.88 kg/m   Estimated body mass index is 26.88 kg/m  as calculated from the following:    Height as of this encounter: 1.791 m (5' 10.5\").    Weight as of this encounter: 86.2 kg (190 lb)..  bp completed using cuff size large  RODNEY VAZ LPN  "

## 2019-03-19 ENCOUNTER — TELEPHONE (OUTPATIENT)
Dept: VASCULAR SURGERY | Facility: CLINIC | Age: 59
End: 2019-03-19

## 2019-03-19 NOTE — TELEPHONE ENCOUNTER
Rec'vd referral from PCP [facility], I called JUDIT gooden

## 2019-03-25 ENCOUNTER — OFFICE VISIT (OUTPATIENT)
Dept: SURGERY | Facility: CLINIC | Age: 59
End: 2019-03-25
Payer: COMMERCIAL

## 2019-03-25 VITALS
HEIGHT: 71 IN | WEIGHT: 190 LBS | HEART RATE: 69 BPM | BODY MASS INDEX: 26.6 KG/M2 | DIASTOLIC BLOOD PRESSURE: 84 MMHG | SYSTOLIC BLOOD PRESSURE: 130 MMHG | RESPIRATION RATE: 16 BRPM | OXYGEN SATURATION: 99 %

## 2019-03-25 DIAGNOSIS — K42.9 UMBILICAL HERNIA WITHOUT OBSTRUCTION AND WITHOUT GANGRENE: Primary | ICD-10-CM

## 2019-03-25 PROCEDURE — 99204 OFFICE O/P NEW MOD 45 MIN: CPT | Performed by: SURGERY

## 2019-03-25 ASSESSMENT — ENCOUNTER SYMPTOMS: ABDOMINAL PAIN: 1

## 2019-03-25 ASSESSMENT — MIFFLIN-ST. JEOR: SCORE: 1691.02

## 2019-03-25 NOTE — PROGRESS NOTES
Jeramie is a 58 year old White male who presents for hernia evaluation. The patient has noticed a bulge. Pain has been present.  Symptoms began 5 years ago.  Symptoms are described as aching and pressure  located in the periumbilical region.  Associated with this  is none.  Pt has not had previous ABD surgery including none.  Patient does report that increased activity/lifting causes pain. Employment does occasionally  require lifting.  He is noting increasing discomfort over the last few months as well as enlargement of the hernia.  Constipation No  Dysuria No  Cough No  Smoking No  Diabetes No    Pt's chart has been reviewed for FH,  PMH, PSH, allergies, medications and social history.    ROS:  Pulm:  No shortness of breath, dyspnea on exertion, cough, or hemoptysis  CV:  negative  ABD:  See chief complaint  :  Negative  All other systems negative    Physical exam:  Patient able to get up on table without difficulty.  Head eyes, nose and mouth within normal limits.  Skin - no jaundice  Sclera are clear  No supraclavicular or cervical adenopathy noted.  Neck shows no gross mass  Neurologic: alert, speech is clear, moves all extremities with good strength  Psychiatric: Mood and affect are appropriate  Respirations are regular and non labored  Abdomen is abdomen is soft without significant tenderness, masses, organomegaly or guarding  bowel sounds are positive and no caput medusa noted.  Hernia is present at the umbilicus, it shows a mass of about 4 cm with a fascial defect about 1.5 cm.  This is reducible but moderately tender.    Imaging  CT No      Assesment: umbilical hernia, increasingly symptomatic    Plan: Discussed observation, external support, possible progression, incarceration and strangulation signs and symptoms and need for immediate treatment if they develop.  Discussed surgery in detail, including risk, benefits, complications, incision/cosmetics, mesh, infection (possibly requiring removal of the  mesh), chronic pain, involvement of inta-abdominal organs, lifting and activity limits after surgery. Gave literature to review. Will schedule surgery in near future  Time spent with the patient with greater that 50% of the time in discussion was 30 minutes.    Sourav Simon MD  3/25/2019 7:08 AM    Please route or send letter to:  Primary Care Provider (PCP) and Include Progress Note    Level 4

## 2019-03-25 NOTE — LETTER
2019    RE: Jeramie Sánchez, : 1960      Jeramie is a 58 year old White male who presents for hernia evaluation. The patient has noticed a bulge. Pain has been present.  Symptoms began 5 years ago.  Symptoms are described as aching and pressure  located in the periumbilical region.  Associated with this  is none.  Pt has not had previous ABD surgery including none.  Patient does report that increased activity/lifting causes pain. Employment does occasionally  require lifting.  He is noting increasing discomfort over the last few months as well as enlargement of the hernia.  Constipation No  Dysuria No  Cough No  Smoking No  Diabetes No     Pt's chart has been reviewed for FH,  PMH, PSH, allergies, medications and social history.     ROS:  Pulm:  No shortness of breath, dyspnea on exertion, cough, or hemoptysis  CV:  negative  ABD:  See chief complaint  :  Negative  All other systems negative     Physical exam:  Patient able to get up on table without difficulty.  Head eyes, nose and mouth within normal limits.  Skin - no jaundice  Sclera are clear  No supraclavicular or cervical adenopathy noted.  Neck shows no gross mass  Neurologic: alert, speech is clear, moves all extremities with good strength  Psychiatric: Mood and affect are appropriate  Respirations are regular and non labored  Abdomen is abdomen is soft without significant tenderness, masses, organomegaly or guarding  bowel sounds are positive and no caput medusa noted.  Hernia is present at the umbilicus, it shows a mass of about 4 cm with a fascial defect about 1.5 cm.  This is reducible but moderately tender.     Imaging  CT No      Assesment: umbilical hernia, increasingly symptomatic     Plan: Discussed observation, external support, possible progression, incarceration and strangulation signs and symptoms and need for immediate treatment if they develop.  Discussed surgery in detail, including risk, benefits, complications,  incision/cosmetics, mesh, infection (possibly requiring removal of the mesh), chronic pain, involvement of inta-abdominal organs, lifting and activity limits after surgery. Gave literature to review. Will schedule surgery in near future    Sourav Simon MD

## 2019-04-10 ENCOUNTER — DOCUMENTATION ONLY (OUTPATIENT)
Dept: SLEEP MEDICINE | Facility: CLINIC | Age: 59
End: 2019-04-10

## 2019-04-10 DIAGNOSIS — G47.33 OSA (OBSTRUCTIVE SLEEP APNEA): ICD-10-CM

## 2019-04-10 NOTE — PROGRESS NOTES
6 month STM    STM Recheck Visit     Diagnostic AHI:   57.2  HST    Data only recheck     Assessment: Pt meeting objective benchmarks.     Action plan:   pt to follow up per provider request       Device type: Auto-CPAP  PAP settings: CPAP min 5.0 cm  H20     CPAP max 15.0 cm  H20         95th% pressure 10.7 cm  H20   Objective measures: 14 day rolling measures      Compliance  100 %      Leak  14.96 lpm  last  upload      AHI 1.25   last  upload      Average number of minutes 407      Objective measure goal  Compliance   Goal >70%  Leak   Goal < 24 lpm  AHI  Goal < 5  Usage  Goal >240

## 2019-04-15 ENCOUNTER — APPOINTMENT (OUTPATIENT)
Dept: VASCULAR SURGERY | Facility: CLINIC | Age: 59
End: 2019-04-15
Attending: NURSE PRACTITIONER
Payer: COMMERCIAL

## 2019-04-15 PROCEDURE — 99207 ZZC VEINSOLUTIONS FREE SCREENING: CPT | Performed by: SURGERY

## 2019-04-29 ENCOUNTER — APPOINTMENT (OUTPATIENT)
Dept: VASCULAR SURGERY | Facility: CLINIC | Age: 59
End: 2019-04-29
Payer: COMMERCIAL

## 2019-04-29 PROCEDURE — 99203 OFFICE O/P NEW LOW 30 MIN: CPT | Performed by: SURGERY

## 2019-04-29 PROCEDURE — 93970 EXTREMITY STUDY: CPT | Performed by: SURGERY

## 2019-08-05 ENCOUNTER — OFFICE VISIT (OUTPATIENT)
Dept: VASCULAR SURGERY | Facility: CLINIC | Age: 59
End: 2019-08-05
Payer: COMMERCIAL

## 2019-08-05 PROCEDURE — 99213 OFFICE O/P EST LOW 20 MIN: CPT | Performed by: SURGERY

## 2019-11-01 DIAGNOSIS — G47.33 OBSTRUCTIVE SLEEP APNEA (ADULT) (PEDIATRIC): Primary | ICD-10-CM

## 2020-02-15 DIAGNOSIS — I10 BENIGN ESSENTIAL HYPERTENSION: ICD-10-CM

## 2020-02-15 NOTE — LETTER
[facility]  [address], [address]  [place], [place]  [address]                                            TEL:[phone]  FAX:[phone]        Jeramie Sánchez  [address] [facility] [address]          February 17, 2020    Dear Jeramie,   We have received a refill request for your medication and noticed you are due for your annual physical. We have sent a one time refill to your pharmacy to allow you time to schedule your appointment. Please call [phone] to schedule this appointment before you are due for your next refill.     Taking care of your health is important to us and ongoing visits with your provider are vital to your care. We look forward to seeing you in the near future.      Thank you,     [facility]

## 2020-02-17 DIAGNOSIS — I10 BENIGN ESSENTIAL HYPERTENSION: ICD-10-CM

## 2020-02-17 RX ORDER — LISINOPRIL AND HYDROCHLOROTHIAZIDE 20; 25 MG/1; MG/1
1 TABLET ORAL DAILY
Qty: 90 TABLET | Refills: 0 | Status: SHIPPED | OUTPATIENT
Start: 2020-02-17 | End: 2020-03-13

## 2020-02-17 RX ORDER — LISINOPRIL AND HYDROCHLOROTHIAZIDE 20; 25 MG/1; MG/1
1 TABLET ORAL DAILY
Qty: 90 TABLET | Refills: 3 | OUTPATIENT
Start: 2020-02-17

## 2020-02-17 NOTE — TELEPHONE ENCOUNTER
Medication is being filled for 1 time refill only due to:  Patient needs to be seen because it has been more than one year since last visit.     Mailed letter regarding scheduling appointment.

## 2020-02-17 NOTE — TELEPHONE ENCOUNTER
"Requested Prescriptions   Pending Prescriptions Disp Refills     lisinopril-hydrochlorothiazide (ZESTORETIC) 20-25 MG tablet [Pharmacy Med  Last Written Prescription Date:  2/21/19  Last Fill Quantity: 90,  # refills: 3   Last Office Visit: 3/15/2019   Future Office Visit:      Name: LISINOPRIL-HCTZ 20/25MG TABLETS] 90 tablet 3     Sig: TAKE 1 TABLET BY MOUTH DAILY       Diuretics (Including Combos) Protocol Failed - 2/15/2020  8:01 AM        Failed - Normal serum creatinine on file in past 12 months     Recent Labs   Lab Test 01/17/19  2100   CR 1.22            Failed - Normal serum potassium on file in past 12 months     Recent Labs   Lab Test 01/17/19  2100   POTASSIUM 3.9            Failed - Normal serum sodium on file in past 12 months     Recent Labs   Lab Test 01/17/19  2100               Passed - Blood pressure under 140/90 in past 12 months     BP Readings from Last 3 Encounters:   03/25/19 130/84   03/15/19 122/78   01/17/19 116/74           Passed - Recent (12 mo) or future (30 days) visit within the authorizing provider's specialty     Patient has had an office visit with the authorizing provider or a provider within the authorizing providers department within the previous 12 mos or has a future within next 30 days. See \"Patient Info\" tab in inbasket, or \"Choose Columns\" in Meds & Orders section of the refill encounter.            Passed - Medication is active on med list        Passed - Patient is age 18 or older          "

## 2020-03-06 ENCOUNTER — TELEPHONE (OUTPATIENT)
Dept: VASCULAR SURGERY | Facility: CLINIC | Age: 60
End: 2020-03-06

## 2020-03-06 NOTE — TELEPHONE ENCOUNTER
[facility]: Claudia    Jeramie Sánchez has a symptomatic history of bilateral varicose veins primarily on the calf region.  He has been followed at our [facility] office and seen by my associate Dr. Zamudio who is recently retired.  Patient has undergone venous duplex ultrasound bilaterally.  Also tried compression therapy with ongoing symptoms which have been documented.    I reviewed the patient's records and ultrasounds and spoke with him on the phone today.  Bilateral VCSS=6  And CEAP=C2    Ultrasound on his left leg reveals incompetence of the common femoral to superficial femoral-popliteal veins which is also noted on the right but no clinical symptoms of venous hypertension at this time.  Both greater saphenous veins are dilated incompetent essentially from the saphenofemoral junction down to the distal calf giving off the surface varicosities.      On his left side the accessory saphenous vein is also incompetent giving off a 3.2 mm varicose vein branch.  The left mid lesser saphenous vein is incompetent the mid calf but the saphenous popliteal junction is competent.  Several varicosities do come off this mid segment.    Dr. Zamudio had initially discussed with Mr. Sánchez treatment on the left leg.  Did recommend complete closure of the greater saphenous vein along with cosmetic stab phlebectomies.  In reviewing the chart and ultrasound I would also recommend closure of the accessory saphenous vein due to some incompetence of varicose vein will prevent problems in the future if feasible and this is been discussed with the patient.  I reviewed the procedure with him.  He we also discussed the possibility temporary or permanent numbness to the accessory saphenous vein.  He understands the post compression protocol and duplex follow-up.      He has very similar issues on his right leg and I discussed a similar procedure closure of the right greater saphenous vein and stab phlebectomies upon recovery from  the left leg surgery.  The accessory saphenous vein and lesser saphenous veins are normal in the side.    Did give him the opportunity to meet with him in the office to Dr. [facility]ud[facility].  He is very comfortable meeting the at the time of the surgery will we will go over this again and marked the surface varicosities.    We will proceed to a schedule the surgery work accordingly.    Jignesh Shipley MD

## 2020-03-11 DIAGNOSIS — I83.812 VARICOSE VEINS OF LEFT LOWER EXTREMITY WITH PAIN: Primary | ICD-10-CM

## 2020-03-12 RX ORDER — AMOXICILLIN 500 MG/1
CAPSULE ORAL
Qty: 4 CAPSULE | Refills: 0 | Status: SHIPPED | OUTPATIENT
Start: 2020-03-12 | End: 2020-11-30

## 2020-03-12 RX ORDER — LORAZEPAM 1 MG/1
TABLET ORAL
Qty: 3 TABLET | Refills: 0 | Status: SHIPPED | OUTPATIENT
Start: 2020-03-12 | End: 2020-11-30

## 2020-03-12 RX ORDER — CLONIDINE HYDROCHLORIDE 0.1 MG/1
TABLET ORAL
Qty: 1 TABLET | Refills: 0 | Status: SHIPPED | OUTPATIENT
Start: 2020-03-12 | End: 2020-11-30

## 2020-03-13 ENCOUNTER — OFFICE VISIT (OUTPATIENT)
Dept: INTERNAL MEDICINE | Facility: CLINIC | Age: 60
End: 2020-03-13
Payer: COMMERCIAL

## 2020-03-13 VITALS
BODY MASS INDEX: 26.74 KG/M2 | OXYGEN SATURATION: 97 % | HEIGHT: 71 IN | RESPIRATION RATE: 12 BRPM | HEART RATE: 75 BPM | SYSTOLIC BLOOD PRESSURE: 114 MMHG | DIASTOLIC BLOOD PRESSURE: 60 MMHG | WEIGHT: 191 LBS | TEMPERATURE: 98 F

## 2020-03-13 DIAGNOSIS — Z12.5 SCREENING FOR PROSTATE CANCER: ICD-10-CM

## 2020-03-13 DIAGNOSIS — I10 BENIGN ESSENTIAL HYPERTENSION: ICD-10-CM

## 2020-03-13 DIAGNOSIS — R97.20 BPH WITH ELEVATED PSA: ICD-10-CM

## 2020-03-13 DIAGNOSIS — Z00.00 ENCOUNTER FOR PREVENTATIVE ADULT HEALTH CARE EXAMINATION: Primary | ICD-10-CM

## 2020-03-13 DIAGNOSIS — I10 ESSENTIAL HYPERTENSION: ICD-10-CM

## 2020-03-13 DIAGNOSIS — Z23 NEED FOR PROPHYLACTIC VACCINATION AND INOCULATION AGAINST INFLUENZA: ICD-10-CM

## 2020-03-13 DIAGNOSIS — N40.0 BPH WITH ELEVATED PSA: ICD-10-CM

## 2020-03-13 DIAGNOSIS — Z23 NEED FOR VACCINATION: ICD-10-CM

## 2020-03-13 LAB
ALBUMIN SERPL-MCNC: 4 G/DL (ref 3.4–5)
ALBUMIN UR-MCNC: NEGATIVE MG/DL
ALP SERPL-CCNC: 74 U/L (ref 40–150)
ALT SERPL W P-5'-P-CCNC: 32 U/L (ref 0–70)
ANION GAP SERPL CALCULATED.3IONS-SCNC: 9 MMOL/L (ref 3–14)
APPEARANCE UR: CLEAR
AST SERPL W P-5'-P-CCNC: 18 U/L (ref 0–45)
BILIRUB SERPL-MCNC: 0.5 MG/DL (ref 0.2–1.3)
BILIRUB UR QL STRIP: NEGATIVE
BUN SERPL-MCNC: 25 MG/DL (ref 7–30)
CALCIUM SERPL-MCNC: 9.4 MG/DL (ref 8.5–10.1)
CHLORIDE SERPL-SCNC: 105 MMOL/L (ref 94–109)
CHOLEST SERPL-MCNC: 203 MG/DL
CO2 SERPL-SCNC: 26 MMOL/L (ref 20–32)
COLOR UR AUTO: YELLOW
CREAT SERPL-MCNC: 1.24 MG/DL (ref 0.66–1.25)
ERYTHROCYTE [DISTWIDTH] IN BLOOD BY AUTOMATED COUNT: 13.5 % (ref 10–15)
GFR SERPL CREATININE-BSD FRML MDRD: 62 ML/MIN/{1.73_M2}
GLUCOSE SERPL-MCNC: 99 MG/DL (ref 70–99)
GLUCOSE UR STRIP-MCNC: NEGATIVE MG/DL
HCT VFR BLD AUTO: 38.3 % (ref 40–53)
HDLC SERPL-MCNC: 55 MG/DL
HGB BLD-MCNC: 12.1 G/DL (ref 13.3–17.7)
HGB UR QL STRIP: NEGATIVE
KETONES UR STRIP-MCNC: NEGATIVE MG/DL
LDLC SERPL CALC-MCNC: 136 MG/DL
LEUKOCYTE ESTERASE UR QL STRIP: NEGATIVE
MCH RBC QN AUTO: 31.1 PG (ref 26.5–33)
MCHC RBC AUTO-ENTMCNC: 31.6 G/DL (ref 31.5–36.5)
MCV RBC AUTO: 99 FL (ref 78–100)
NITRATE UR QL: NEGATIVE
NONHDLC SERPL-MCNC: 148 MG/DL
PH UR STRIP: 5.5 PH (ref 5–7)
PLATELET # BLD AUTO: 194 10E9/L (ref 150–450)
POTASSIUM SERPL-SCNC: 4.4 MMOL/L (ref 3.4–5.3)
PROT SERPL-MCNC: 7.7 G/DL (ref 6.8–8.8)
PSA SERPL-ACNC: 13.9 UG/L (ref 0–4)
RBC # BLD AUTO: 3.89 10E12/L (ref 4.4–5.9)
SODIUM SERPL-SCNC: 140 MMOL/L (ref 133–144)
SOURCE: NORMAL
SP GR UR STRIP: 1.02 (ref 1–1.03)
TRIGL SERPL-MCNC: 59 MG/DL
TSH SERPL DL<=0.005 MIU/L-ACNC: 0.78 MU/L (ref 0.4–4)
UROBILINOGEN UR STRIP-ACNC: 0.2 EU/DL (ref 0.2–1)
WBC # BLD AUTO: 3.9 10E9/L (ref 4–11)

## 2020-03-13 PROCEDURE — 99396 PREV VISIT EST AGE 40-64: CPT | Performed by: INTERNAL MEDICINE

## 2020-03-13 PROCEDURE — 84443 ASSAY THYROID STIM HORMONE: CPT | Performed by: INTERNAL MEDICINE

## 2020-03-13 PROCEDURE — 80061 LIPID PANEL: CPT | Performed by: INTERNAL MEDICINE

## 2020-03-13 PROCEDURE — 80053 COMPREHEN METABOLIC PANEL: CPT | Performed by: INTERNAL MEDICINE

## 2020-03-13 PROCEDURE — 81003 URINALYSIS AUTO W/O SCOPE: CPT | Performed by: INTERNAL MEDICINE

## 2020-03-13 PROCEDURE — 90682 RIV4 VACC RECOMBINANT DNA IM: CPT | Performed by: INTERNAL MEDICINE

## 2020-03-13 PROCEDURE — G0103 PSA SCREENING: HCPCS | Performed by: INTERNAL MEDICINE

## 2020-03-13 PROCEDURE — 36415 COLL VENOUS BLD VENIPUNCTURE: CPT | Performed by: INTERNAL MEDICINE

## 2020-03-13 PROCEDURE — 90750 HZV VACC RECOMBINANT IM: CPT | Performed by: INTERNAL MEDICINE

## 2020-03-13 PROCEDURE — 90472 IMMUNIZATION ADMIN EACH ADD: CPT | Performed by: INTERNAL MEDICINE

## 2020-03-13 PROCEDURE — 85027 COMPLETE CBC AUTOMATED: CPT | Performed by: INTERNAL MEDICINE

## 2020-03-13 PROCEDURE — 90471 IMMUNIZATION ADMIN: CPT | Performed by: INTERNAL MEDICINE

## 2020-03-13 RX ORDER — LISINOPRIL AND HYDROCHLOROTHIAZIDE 20; 25 MG/1; MG/1
1 TABLET ORAL DAILY
Qty: 90 TABLET | Refills: 3 | Status: SHIPPED | OUTPATIENT
Start: 2020-03-13 | End: 2021-05-18

## 2020-03-13 ASSESSMENT — ENCOUNTER SYMPTOMS
HEMATOCHEZIA: 0
ABDOMINAL PAIN: 0
DIARRHEA: 0
CHILLS: 0
DIZZINESS: 0
COUGH: 0
CONSTIPATION: 0
HEMATURIA: 0

## 2020-03-13 ASSESSMENT — MIFFLIN-ST. JEOR: SCORE: 1690.56

## 2020-03-13 NOTE — NURSING NOTE
"Vital signs:  Temp: 98  F (36.7  C) Temp src: Oral BP: 114/60 Pulse: 75   Resp: 12 SpO2: 97 %     Height: 179.1 cm (5' 10.5\") Weight: 86.6 kg (191 lb)  Estimated body mass index is 27.02 kg/m  as calculated from the following:    Height as of this encounter: 1.791 m (5' 10.5\").    Weight as of this encounter: 86.6 kg (191 lb).          "

## 2020-03-13 NOTE — PROGRESS NOTES
SUBJECTIVE:   CC: Jeramie Sánchez is an 59 year old male who presents for preventative health visit.     Healthy Habits:     Getting at least 3 servings of Calcium per day:  Yes    Bi-annual eye exam:  NO    Dental care twice a year:  Yes    Sleep apnea or symptoms of sleep apnea:  Sleep apnea    Diet:  Regular (no restrictions)    Frequency of exercise:  4-5 days/week    Duration of exercise:  45-60 minutes    Taking medications regularly:  Yes    Medication side effects:  None    PHQ-2 Total Score: 0    Additional concerns today:  No          PROBLEMS TO ADD ON...  Has h/o HTN. on medical treatment. BP has been controlled. No side effects from medications. No CP, HA, dizziness. good compliance with medications and low salt diet.      Today's PHQ-2 Score:   PHQ-2 ( 1999 Pfizer) 3/13/2020   Q1: Little interest or pleasure in doing things 0   Q2: Feeling down, depressed or hopeless 0   PHQ-2 Score 0   Q1: Little interest or pleasure in doing things Not at all   Q2: Feeling down, depressed or hopeless Not at all   PHQ-2 Score 0       Abuse: Current or Past(Physical, Sexual or Emotional)- No  Do you feel safe in your environment? Yes        Social History     Tobacco Use     Smoking status: Never Smoker     Smokeless tobacco: Never Used   Substance Use Topics     Alcohol use: Yes     Comment: 3 on weekends     If you drink alcohol do you typically have >3 drinks per day or >7 drinks per week? No    Alcohol Use 3/13/2020   Prescreen: >3 drinks/day or >7 drinks/week? No   No flowsheet data found.    Last PSA:   PSA   Date Value Ref Range Status   02/14/2018 11.80 (H) 0 - 4 ug/L Final     Comment:     Assay Method:  Chemiluminescence using Siemens Vista analyzer       Reviewed orders with patient. Reviewed health maintenance and updated orders accordingly - Yes  Lab work is in process  Labs reviewed in EPIC    Reviewed and updated as needed this visit by clinical staff         Reviewed and updated as needed this visit  by Provider            Review of Systems   Constitutional: Negative for chills.   HENT: Negative for congestion.    Respiratory: Negative for cough.    Cardiovascular: Negative for chest pain.   Gastrointestinal: Negative for abdominal pain, constipation, diarrhea and hematochezia.   Genitourinary: Negative for hematuria.   Neurological: Negative for dizziness.         OBJECTIVE:   There were no vitals taken for this visit.    Physical Exam  GENERAL: healthy, alert and no distress  EYES: Eyes grossly normal to inspection, PERRL and conjunctivae and sclerae normal  HENT: ear canals and TM's normal, nose and mouth without ulcers or lesions  NECK: no adenopathy, no asymmetry, masses, or scars and thyroid normal to palpation  RESP: lungs clear to auscultation - no rales, rhonchi or wheezes  CV: regular rate and rhythm, normal S1 S2, no S3 or S4, no murmur, click or rub, no peripheral edema and peripheral pulses strong  ABDOMEN: soft, nontender, no hepatosplenomegaly, no masses and bowel sounds normal  RECTAL: normal sphincter tone, no rectal masses, prostate 2+ normal size, smooth, nontender without nodules or masses  MS: no gross musculoskeletal defects noted, no edema  SKIN: no suspicious lesions or rashes  NEURO: Normal strength and tone, mentation intact and speech normal  PSYCH: mentation appears normal, affect normal/bright    Diagnostic Test Results:  Labs reviewed in Epic    ASSESSMENT/PLAN:       ICD-10-CM    1. Encounter for preventative adult health care examination  Z00.00 CBC with platelets     Comprehensive metabolic panel     Lipid panel reflex to direct LDL Fasting     TSH with free T4 reflex     Prostate spec antigen screen     *UA reflex to Microscopic   2. Screening for prostate cancer  Z12.5 Prostate spec antigen screen   3. Essential hypertension  I10 CBC with platelets     Comprehensive metabolic panel     Lipid panel reflex to direct LDL Fasting     TSH with free T4 reflex     *UA reflex to  "Microscopic   4. Benign essential hypertension  I10 lisinopril-hydrochlorothiazide (ZESTORETIC) 20-25 MG tablet     Assess lab  Monitor BP  Cont treatment     COUNSELING:   Reviewed preventive health counseling, as reflected in patient instructions       Regular exercise       Healthy diet/nutrition       Vision screening       Hearing screening       Colon cancer screening       Prostate cancer screening    Estimated body mass index is 26.88 kg/m  as calculated from the following:    Height as of 3/25/19: 1.791 m (5' 10.5\").    Weight as of 3/25/19: 86.2 kg (190 lb).          reports that he has never smoked. He has never used smokeless tobacco.      Counseling Resources:  ATP IV Guidelines  Pooled Cohorts Equation Calculator  FRAX Risk Assessment  ICSI Preventive Guidelines  Dietary Guidelines for Americans, 2010  USDA's MyPlate  ASA Prophylaxis  Lung CA Screening    Maninder Espinoza MD  [facility]  "

## 2020-03-16 ENCOUNTER — TELEPHONE (OUTPATIENT)
Dept: VASCULAR SURGERY | Facility: CLINIC | Age: 60
End: 2020-03-16

## 2020-03-16 NOTE — TELEPHONE ENCOUNTER
Informed by Lisa, surgery schedule, that pt cancelled his procedure for next Friday 3/27 with Dr. Shipley.    Called and had to [facility] for pt informing him to  his preop medication (ativan, clonidine & amoxicillin) if he hasn't already (it was e-scribed to his pharmacy on 3/12) and to hold onto these medications until we get his procedure rescheduled. Instructed pt to call us back if he has any questions.    Sondra Fernandes RN

## 2020-04-13 ENCOUNTER — VIRTUAL VISIT (OUTPATIENT)
Dept: UROLOGY | Facility: CLINIC | Age: 60
End: 2020-04-13
Attending: INTERNAL MEDICINE
Payer: COMMERCIAL

## 2020-04-13 VITALS — WEIGHT: 192 LBS | BODY MASS INDEX: 26.88 KG/M2 | HEIGHT: 71 IN

## 2020-04-13 DIAGNOSIS — R97.20 ELEVATED PROSTATE SPECIFIC ANTIGEN (PSA): Primary | ICD-10-CM

## 2020-04-13 PROCEDURE — 99201 ZZC OFFICE/OUTPT VISIT, NEW, LEVEL I: CPT | Mod: TEL | Performed by: UROLOGY

## 2020-04-13 ASSESSMENT — MIFFLIN-ST. JEOR: SCORE: 1695.1

## 2020-04-13 ASSESSMENT — PAIN SCALES - GENERAL: PAINLEVEL: NO PAIN (0)

## 2020-04-13 NOTE — PROGRESS NOTES
"Jeramie Sánchez is a 59 year old male who is being evaluated via a billable telephone visit.      The patient has been notified of following:     \"This telephone visit will be conducted via a call between you and your physician/provider. We have found that certain health care needs can be provided without the need for a physical exam.  This service lets us provide the care you need with a short phone conversation.  If a prescription is necessary we can send it directly to your pharmacy.  If lab work is needed we can place an order for that and you can then stop by our lab to have the test done at a later time.    Telephone visits are billed at different rates depending on your insurance coverage. During this emergency period, for some insurers they may be billed the same as an in-person visit.  Please reach out to your insurance provider with any questions.    If during the course of the call the physician/provider feels a telephone visit is not appropriate, you will not be charged for this service.\"    Patient has given verbal consent for Telephone visit?  Yes    How would you like to obtain your AVS?     Additional provider notes: This is a 59 year old gentleman who is referred for an elevated PSA. PSA was first elevated in 2018 and was 11.8, now it is 13.9. he has no prior urologic Hx and has never seen a urologist. He has no Hx of gross hematuria or infections. No Family hx of prostate CA. He has no urinary symptoms or complaints    I recommended he undergo a prostate MRI and prostate biopsy. We discussed both tests. These tests are being deferred until may due to Covid. MRI is to be scheduled in May and then he and I will go over the results on the telephone.    Phone call duration: 5 minutes    Power Barrera M.D.  [facility]        "

## 2020-04-13 NOTE — NURSING NOTE
Chief Complaint   Patient presents with     Elevated PSA     Benign Prostatic Hypertrophy     Fior Jarvis, EMT

## 2020-04-20 ENCOUNTER — ANCILLARY PROCEDURE (OUTPATIENT)
Dept: MRI IMAGING | Facility: CLINIC | Age: 60
End: 2020-04-20
Attending: UROLOGY
Payer: COMMERCIAL

## 2020-04-20 DIAGNOSIS — R97.20 ELEVATED PROSTATE SPECIFIC ANTIGEN (PSA): ICD-10-CM

## 2020-04-20 RX ORDER — GADOBUTROL 604.72 MG/ML
10 INJECTION INTRAVENOUS ONCE
Status: COMPLETED | OUTPATIENT
Start: 2020-04-20 | End: 2020-04-20

## 2020-04-20 RX ADMIN — GADOBUTROL 9 ML: 604.72 INJECTION INTRAVENOUS at 13:46

## 2020-04-20 NOTE — DISCHARGE INSTRUCTIONS
MRI Contrast Discharge Instructions    The IV contrast you received today will pass out of your body in your  urine. This will happen in the next 24 hours. You will not feel this process.  Your urine will not change color.    Drink at least 4 extra glasses of water or juice today (unless your doctor  has restricted your fluids). This reduces the stress on your kidneys.  You may take your regular medicines.    If you are on dialysis: It is best to have dialysis today.    If you have a reaction: Most reactions happen right away. If you have  any new symptoms after leaving the hospital (such as hives or swelling),  call your hospital at the correct number below. Or call your family doctor.  If you have breathing distress or wheezing, call 911.    Special instructions: ***    I have read and understand the above information.    Signature:______________________________________ Date:___________    Staff:__________________________________________ Date:___________     Time:__________    [facility] Departments:    ___[place]: [phone]  ___[facility]: [phone]  ___[place]: [phone] ___[facility]: [phone]  ___[facility]: [phone]  ___[facility]: [phone]  ___[phone]  ___[facility]: [phone]  ___[phone]

## 2020-04-27 ENCOUNTER — VIRTUAL VISIT (OUTPATIENT)
Dept: UROLOGY | Facility: CLINIC | Age: 60
End: 2020-04-27
Payer: COMMERCIAL

## 2020-04-27 VITALS — WEIGHT: 192 LBS | BODY MASS INDEX: 26.88 KG/M2 | HEIGHT: 71 IN

## 2020-04-27 DIAGNOSIS — R97.20 ELEVATED PROSTATE SPECIFIC ANTIGEN (PSA): Primary | ICD-10-CM

## 2020-04-27 PROCEDURE — 99207 ZZC NO BILLABLE SERVICE THIS VISIT: CPT | Performed by: UROLOGY

## 2020-04-27 ASSESSMENT — MIFFLIN-ST. JEOR: SCORE: 1695.1

## 2020-04-27 ASSESSMENT — PAIN SCALES - GENERAL: PAINLEVEL: NO PAIN (0)

## 2020-04-27 NOTE — PROGRESS NOTES
"Jeramie Sánchez is a 59 year old male who is being evaluated via a billable telephone visit.      The patient has been notified of following:     \"This telephone visit will be conducted via a call between you and your physician/provider. We have found that certain health care needs can be provided without the need for a physical exam.  This service lets us provide the care you need with a short phone conversation.  If a prescription is necessary we can send it directly to your pharmacy.  If lab work is needed we can place an order for that and you can then stop by our lab to have the test done at a later time.    Telephone visits are billed at different rates depending on your insurance coverage. During this emergency period, for some insurers they may be billed the same as an in-person visit.  Please reach out to your insurance provider with any questions.    If during the course of the call the physician/provider feels a telephone visit is not appropriate, you will not be charged for this service.\"    Patient has given verbal consent for Telephone visit?  Yes    How would you like to obtain your AVS?     Jeramie is scheduled for a follow-up visit via telephone today.  He has an elevated PSA and we are to follow-up on his MRI of the prostate today.  His MRI of the prostate showed a very enlarged prostate measuring 100 g.  There were no areas concerning for prostate cancer.  Despite his enlarged prostate he continues to have no urinary symptoms or complaints.  We discussed the MRI results.  We discussed the risks of false negatives with the MRI.  If his PSA does not improve he may still need to undergo a prostate biopsy nonetheless.  I recommended that in 1 month we check his PSA and then have him come in for a urinalysis bladder scan and prostate exam and he agreed to this plan.    Phone call duration: 4 minutes    Power Barrera M.D.  [facility]          "

## 2020-04-27 NOTE — NURSING NOTE
Chief Complaint   Patient presents with     Elevated PSA     Benign Prostatic Hypertrophy     Review MRI results     Fior Jarvis, EMT

## 2020-06-10 DIAGNOSIS — R97.20 ELEVATED PROSTATE SPECIFIC ANTIGEN (PSA): ICD-10-CM

## 2020-06-10 PROCEDURE — 36415 COLL VENOUS BLD VENIPUNCTURE: CPT | Performed by: UROLOGY

## 2020-06-10 PROCEDURE — 84153 ASSAY OF PSA TOTAL: CPT | Performed by: UROLOGY

## 2020-06-11 LAB — PSA SERPL-MCNC: 12.9 UG/L (ref 0–4)

## 2020-06-15 ENCOUNTER — VIRTUAL VISIT (OUTPATIENT)
Dept: UROLOGY | Facility: CLINIC | Age: 60
End: 2020-06-15
Payer: COMMERCIAL

## 2020-06-15 VITALS — HEIGHT: 71 IN | WEIGHT: 193 LBS | BODY MASS INDEX: 27.02 KG/M2

## 2020-06-15 DIAGNOSIS — R97.20 ELEVATED PROSTATE SPECIFIC ANTIGEN (PSA): Primary | ICD-10-CM

## 2020-06-15 PROCEDURE — 99213 OFFICE O/P EST LOW 20 MIN: CPT | Mod: TEL | Performed by: UROLOGY

## 2020-06-15 RX ORDER — CIPROFLOXACIN 500 MG/1
500 TABLET, FILM COATED ORAL 2 TIMES DAILY
Qty: 6 TABLET | Refills: 0 | Status: SHIPPED | OUTPATIENT
Start: 2020-06-15 | End: 2020-06-18

## 2020-06-15 ASSESSMENT — MIFFLIN-ST. JEOR: SCORE: 1699.63

## 2020-06-15 ASSESSMENT — PAIN SCALES - GENERAL: PAINLEVEL: NO PAIN (0)

## 2020-06-15 NOTE — LETTER
"6/15/2020       RE: Jeramie Sánchez  [address]  [place] [address]     Dear Colleague,    Thank you for referring your patient, Jeramie Sánchez, to the [facility] [place] at [facility]. Please see a copy of my visit note below.    Jeramie Sánchez is a 59 year old male who is being evaluated via a billable telephone visit.      The patient has been notified of following:     \"This telephone visit will be conducted via a call between you and your physician/provider. We have found that certain health care needs can be provided without the need for a physical exam.  This service lets us provide the care you need with a short phone conversation.  If a prescription is necessary we can send it directly to your pharmacy.  If lab work is needed we can place an order for that and you can then stop by our lab to have the test done at a later time.    Telephone visits are billed at different rates depending on your insurance coverage. During this emergency period, for some insurers they may be billed the same as an in-person visit.  Please reach out to your insurance provider with any questions.    If during the course of the call the physician/provider feels a telephone visit is not appropriate, you will not be charged for this service.\"    Patient has given verbal consent for Telephone visit?  Yes    What phone number would you like to be contacted at? [phone]    How would you like to obtain your AVS? Mail a copy     Office Visit Note  [facility]  [phone]    UROLOGIC DIAGNOSES:   Elevated PSA and enlarged prostate    CURRENT INTERVENTIONS:   Negative MRI prostate    HISTORY:   Jeramie is set up for telephone visit today to go over his recent PSA.  His PSA remains elevated at 12.9.      PAST MEDICAL HISTORY:   Past Medical History:   Diagnosis Date     Arthritis      Hypertension        PAST SURGICAL HISTORY: "   Past Surgical History:   Procedure Laterality Date     ARTHROPLASTY HIP ANTERIOR Left 11/25/2014    Procedure: ARTHROPLASTY HIP ANTERIOR;  Surgeon: Drew Phelps MD;  Location: [facility] OR     ENT SURGERY      jaw repair for dental reasons       FAMILY HISTORY:   Family History   Problem Relation Age of Onset     Other Cancer Mother      Heart Surgery Father      Diabetes No family hx of      Colon Cancer No family hx of        SOCIAL HISTORY:   Social History     Socioeconomic History     Marital status:      Spouse name: Not on file     Number of children: Not on file     Years of education: Not on file     Highest education level: Not on file   Occupational History     Not on file   Social Needs     Financial resource strain: Not on file     Food insecurity     Worry: Not on file     Inability: Not on file     Transportation needs     Medical: Not on file     Non-medical: Not on file   Tobacco Use     Smoking status: Never Smoker     Smokeless tobacco: Never Used   Substance and Sexual Activity     Alcohol use: Yes     Comment: 3 on weekends     Drug use: No     Sexual activity: Yes   Lifestyle     Physical activity     Days per week: Not on file     Minutes per session: Not on file     Stress: Not on file   Relationships     Social connections     Talks on phone: Not on file     Gets together: Not on file     Attends Rastafari service: Not on file     Active member of club or organization: Not on file     Attends meetings of clubs or organizations: Not on file     Relationship status: Not on file     Intimate partner violence     Fear of current or ex partner: Not on file     Emotionally abused: Not on file     Physically abused: Not on file     Forced sexual activity: Not on file   Other Topics Concern     Parent/sibling w/ CABG, MI or angioplasty before 65F 55M? Not Asked   Social History Narrative     Not on file       Review Of Systems:  Skin: No rash, pruritis, or skin pigmentation  Eyes: No changes  in vision  Ears/Nose/Throat: No changes in hearing, no nosebleeds  Respiratory: No shortness of breath, dyspnea on exertion, cough, or hemoptysis  Cardiovascular: No chest pain or palpitations  Gastrointestinal: No diarrhea or constipation. No abdominal pain. No hematochezia  Genitourinary: see HPI  Musculoskeletal: No pain or swelling of joints, normal range of motion  Neurologic: No weakness or tremors  Psychiatric: No recent changes in memory or mood  Hematologic/Lymphatic/Immunologic: No easy bruising or enlarged lymph nodes  Endocrine: No weight gain or loss      PHYSICAL EXAM:    General: Alert and oriented to time, place, and self. In NAD   Lungs: no respiratory distress or labored breathing  Neuro: Alert, oriented, speech and mentation normal   Psych: affect and mood normal      Imaging: None    Urinalysis: UA RESULTS:  Recent Labs   Lab Test 03/13/20  1048 11/14/18  1530   COLOR Yellow Yellow   APPEARANCE Clear Clear   URINEGLC Negative Negative   URINEBILI Negative Negative   URINEKETONE Negative Negative   SG 1.020 1.005   UBLD Negative Negative   URINEPH 5.5 5.5   PROTEIN Negative Negative   UROBILINOGEN 0.2 0.2   NITRITE Negative Negative   LEUKEST Negative Negative   RBCU  --  O - 2   WBCU  --  0 - 5       PSA: 12.9    Post Void Residual:     Other labs: None today      IMPRESSION:  Elevated PSA, enlarged prostate    PLAN:  We discussed the findings.  The PSA is fluctuated a bit since February but it remains quite elevated.  We discussed the risk of false negative with an MRI the prostate.  I recommended we proceed with a prostate biopsy.  We discussed prostate biopsy in detail today along with its risks, including bleeding and infection.  He wishes to proceed.  Ciprofloxacin was prescribed for prophylaxis.    When he comes in for the biopsy I will have him check a urinalysis and bladder scan that day as well.      Power Barrera M.D.              Phone call duration: 6 minutes    Power Benjamin  MD [facility]

## 2020-06-15 NOTE — NURSING NOTE
Chief Complaint   Patient presents with     Elevated PSA     Review latest PSA     Fior Jarvis, EMT

## 2020-06-15 NOTE — PROGRESS NOTES
"Jeramie Sánchez is a 59 year old male who is being evaluated via a billable telephone visit.      The patient has been notified of following:     \"This telephone visit will be conducted via a call between you and your physician/provider. We have found that certain health care needs can be provided without the need for a physical exam.  This service lets us provide the care you need with a short phone conversation.  If a prescription is necessary we can send it directly to your pharmacy.  If lab work is needed we can place an order for that and you can then stop by our lab to have the test done at a later time.    Telephone visits are billed at different rates depending on your insurance coverage. During this emergency period, for some insurers they may be billed the same as an in-person visit.  Please reach out to your insurance provider with any questions.    If during the course of the call the physician/provider feels a telephone visit is not appropriate, you will not be charged for this service.\"    Patient has given verbal consent for Telephone visit?  Yes    What phone number would you like to be contacted at? [phone]    How would you like to obtain your AVS? Mail a copy     Office Visit Note  [facility]  [phone]    UROLOGIC DIAGNOSES:   Elevated PSA and enlarged prostate    CURRENT INTERVENTIONS:   Negative MRI prostate    HISTORY:   Jeramie is set up for telephone visit today to go over his recent PSA.  His PSA remains elevated at 12.9.      PAST MEDICAL HISTORY:   Past Medical History:   Diagnosis Date     Arthritis      Hypertension        PAST SURGICAL HISTORY:   Past Surgical History:   Procedure Laterality Date     ARTHROPLASTY HIP ANTERIOR Left 11/25/2014    Procedure: ARTHROPLASTY HIP ANTERIOR;  Surgeon: Drew Phelps MD;  Location: [facility] OR     ENT SURGERY      jaw repair for dental reasons       FAMILY HISTORY:   Family History   Problem Relation Age of Onset     Other " Cancer Mother      Heart Surgery Father      Diabetes No family hx of      Colon Cancer No family hx of        SOCIAL HISTORY:   Social History     Socioeconomic History     Marital status:      Spouse name: Not on file     Number of children: Not on file     Years of education: Not on file     Highest education level: Not on file   Occupational History     Not on file   Social Needs     Financial resource strain: Not on file     Food insecurity     Worry: Not on file     Inability: Not on file     Transportation needs     Medical: Not on file     Non-medical: Not on file   Tobacco Use     Smoking status: Never Smoker     Smokeless tobacco: Never Used   Substance and Sexual Activity     Alcohol use: Yes     Comment: 3 on weekends     Drug use: No     Sexual activity: Yes   Lifestyle     Physical activity     Days per week: Not on file     Minutes per session: Not on file     Stress: Not on file   Relationships     Social connections     Talks on phone: Not on file     Gets together: Not on file     Attends Hoahaoism service: Not on file     Active member of club or organization: Not on file     Attends meetings of clubs or organizations: Not on file     Relationship status: Not on file     Intimate partner violence     Fear of current or ex partner: Not on file     Emotionally abused: Not on file     Physically abused: Not on file     Forced sexual activity: Not on file   Other Topics Concern     Parent/sibling w/ CABG, MI or angioplasty before 65F 55M? Not Asked   Social History Narrative     Not on file       Review Of Systems:  Skin: No rash, pruritis, or skin pigmentation  Eyes: No changes in vision  Ears/Nose/Throat: No changes in hearing, no nosebleeds  Respiratory: No shortness of breath, dyspnea on exertion, cough, or hemoptysis  Cardiovascular: No chest pain or palpitations  Gastrointestinal: No diarrhea or constipation. No abdominal pain. No hematochezia  Genitourinary: see HPI  Musculoskeletal: No  pain or swelling of joints, normal range of motion  Neurologic: No weakness or tremors  Psychiatric: No recent changes in memory or mood  Hematologic/Lymphatic/Immunologic: No easy bruising or enlarged lymph nodes  Endocrine: No weight gain or loss      PHYSICAL EXAM:    General: Alert and oriented to time, place, and self. In NAD   Lungs: no respiratory distress or labored breathing  Neuro: Alert, oriented, speech and mentation normal   Psych: affect and mood normal      Imaging: None    Urinalysis: UA RESULTS:  Recent Labs   Lab Test 03/13/20  1048 11/14/18  1530   COLOR Yellow Yellow   APPEARANCE Clear Clear   URINEGLC Negative Negative   URINEBILI Negative Negative   URINEKETONE Negative Negative   SG 1.020 1.005   UBLD Negative Negative   URINEPH 5.5 5.5   PROTEIN Negative Negative   UROBILINOGEN 0.2 0.2   NITRITE Negative Negative   LEUKEST Negative Negative   RBCU  --  O - 2   WBCU  --  0 - 5       PSA: 12.9    Post Void Residual:     Other labs: None today      IMPRESSION:  Elevated PSA, enlarged prostate    PLAN:  We discussed the findings.  The PSA is fluctuated a bit since February but it remains quite elevated.  We discussed the risk of false negative with an MRI the prostate.  I recommended we proceed with a prostate biopsy.  We discussed prostate biopsy in detail today along with its risks, including bleeding and infection.  He wishes to proceed.  Ciprofloxacin was prescribed for prophylaxis.  When he comes in for the biopsy I will have him check a urinalysis and bladder scan that day as well.      Power Barrera M.D.              Phone call duration: 6 minutes    Power Barrera MD

## 2020-06-24 ENCOUNTER — TRANSFERRED RECORDS (OUTPATIENT)
Dept: HEALTH INFORMATION MANAGEMENT | Facility: CLINIC | Age: 60
End: 2020-06-24

## 2020-07-07 ENCOUNTER — TELEPHONE (OUTPATIENT)
Dept: UROLOGY | Facility: CLINIC | Age: 60
End: 2020-07-07

## 2020-07-07 ENCOUNTER — OFFICE VISIT (OUTPATIENT)
Dept: UROLOGY | Facility: CLINIC | Age: 60
End: 2020-07-07
Payer: COMMERCIAL

## 2020-07-07 ENCOUNTER — APPOINTMENT (OUTPATIENT)
Dept: UROLOGY | Facility: CLINIC | Age: 60
End: 2020-07-07
Payer: COMMERCIAL

## 2020-07-07 VITALS
HEIGHT: 71 IN | DIASTOLIC BLOOD PRESSURE: 72 MMHG | OXYGEN SATURATION: 97 % | WEIGHT: 185 LBS | HEART RATE: 72 BPM | BODY MASS INDEX: 25.9 KG/M2 | SYSTOLIC BLOOD PRESSURE: 118 MMHG

## 2020-07-07 DIAGNOSIS — R97.20 ELEVATED PROSTATE SPECIFIC ANTIGEN (PSA): ICD-10-CM

## 2020-07-07 DIAGNOSIS — N40.0 ENLARGED PROSTATE: Primary | ICD-10-CM

## 2020-07-07 LAB
ALBUMIN UR-MCNC: NEGATIVE MG/DL
APPEARANCE UR: CLEAR
BILIRUB UR QL STRIP: NEGATIVE
COLOR UR AUTO: YELLOW
GLUCOSE UR STRIP-MCNC: NEGATIVE MG/DL
HGB UR QL STRIP: ABNORMAL
KETONES UR STRIP-MCNC: NEGATIVE MG/DL
LEUKOCYTE ESTERASE UR QL STRIP: NEGATIVE
NITRATE UR QL: NEGATIVE
PH UR STRIP: 5.5 PH (ref 5–7)
SOURCE: ABNORMAL
SP GR UR STRIP: 1.02 (ref 1–1.03)
UROBILINOGEN UR STRIP-ACNC: 0.2 EU/DL (ref 0.2–1)

## 2020-07-07 PROCEDURE — 88341 IMHCHEM/IMCYTCHM EA ADD ANTB: CPT | Performed by: UROLOGY

## 2020-07-07 PROCEDURE — 96372 THER/PROPH/DIAG INJ SC/IM: CPT | Mod: 59 | Performed by: UROLOGY

## 2020-07-07 PROCEDURE — 88305 TISSUE EXAM BY PATHOLOGIST: CPT | Performed by: UROLOGY

## 2020-07-07 PROCEDURE — 88341 IMHCHEM/IMCYTCHM EA ADD ANTB: CPT | Mod: 26,59 | Performed by: UROLOGY

## 2020-07-07 PROCEDURE — 88305 TISSUE EXAM BY PATHOLOGIST: CPT | Mod: 26,59 | Performed by: PATHOLOGY

## 2020-07-07 PROCEDURE — 81003 URINALYSIS AUTO W/O SCOPE: CPT | Performed by: UROLOGY

## 2020-07-07 PROCEDURE — 88341 IMHCHEM/IMCYTCHM EA ADD ANTB: CPT | Mod: 26,59 | Performed by: PATHOLOGY

## 2020-07-07 PROCEDURE — 55700 ZZHC BIOPSY PROSTATE NEEDLE/PUNCH: CPT | Performed by: UROLOGY

## 2020-07-07 PROCEDURE — 88342 IMHCHEM/IMCYTCHM 1ST ANTB: CPT | Mod: 26 | Performed by: UROLOGY

## 2020-07-07 PROCEDURE — 88305 TISSUE EXAM BY PATHOLOGIST: CPT | Mod: 26,59 | Performed by: UROLOGY

## 2020-07-07 PROCEDURE — 76872 US TRANSRECTAL: CPT | Performed by: UROLOGY

## 2020-07-07 PROCEDURE — 88342 IMHCHEM/IMCYTCHM 1ST ANTB: CPT | Mod: 26,59 | Performed by: PATHOLOGY

## 2020-07-07 PROCEDURE — 88342 IMHCHEM/IMCYTCHM 1ST ANTB: CPT | Performed by: UROLOGY

## 2020-07-07 ASSESSMENT — PAIN SCALES - GENERAL: PAINLEVEL: NO PAIN (0)

## 2020-07-07 ASSESSMENT — MIFFLIN-ST. JEOR: SCORE: 1658.34

## 2020-07-07 NOTE — PATIENT INSTRUCTIONS
[facility]  Transrectal Ultrasound  Post Operative Information    The physician who performed your Transrectal Ultrasound is Dr. Barrera   (telephone number [phone]).  Please contact this doctor if you have any problems or questions.  If unable to reach your doctor, please return to the Emergency Department.      Take one antibiotic the evening of the procedure and then as directed on your prescription.    Drink at least 6-8 glasses of fluids for the first 48 hours.    Avoid heavy lifting and strenuous activity for 48 hours.    Avoid sexual intercourse for the first 24 hours.    No aspirin or ibuprofen products (Motrin, Advil, Nuprin, ect.) for one week.  You may take acetaminophen (Tylenol) for pain.    You may notice a small amount of blood on the tissue after a bowel movement.    You may pass blood with clots in your urine following the procedure.  The amount will decrease with time but may be visible for up to two weeks.     You make have blood in your semen for 4 weeks after the procedure.    You may experience mild perineal (groin area) discomfort after the procedure.    Please call you doctor if you have any of the follow symptoms:  Fever  Increase in the amount of blood passed  Severe discomfort or pain

## 2020-07-07 NOTE — TELEPHONE ENCOUNTER
Patient pre-screened for appointment. He has not been in contact with anyone who has had COVID and denies respiratory symptoms, cough and fever. Patient is aware no visitors to to come 5 minutes on either side of appointment. He plans to come in early for nurse LUIS MANUEL, DANI. Patient will do an enema before procedure, has been off all blood-thinners and has started ABT yesterday.     Nicole Cam LPN

## 2020-07-07 NOTE — PROGRESS NOTES
Jeramie Sánchez is here for a transrectal altrasound guided needle biopsy of the prostate for a significant risk of potentially lethal prostate cancer.    The risks, benefits, of the procudure were discussed.  All questions were answered.  A written informed consent was obtained.      PSA   Date Value Ref Range Status   06/10/2020 12.90 (H) 0 - 4 ug/L Final     Comment:     Assay Method:  Chemiluminescence using Siemens Vista analyzer   03/13/2020 13.90 (H) 0 - 4 ug/L Final     Comment:     Assay Method:  Chemiluminescence using Siemens Vista analyzer   02/14/2018 11.80 (H) 0 - 4 ug/L Final     Comment:     Assay Method:  Chemiluminescence using Siemens Vista analyzer       An enema was completed and 500 mg of Cipro twice daily was started prior to the biopsy.  After obtaining informed consent patient was placed in lateral decubitus position.  The ultrasound probe was placed in the rectum.  The prostate was numbed using ultrasound guidance with 1% lidocaine 5 mls along each nerve bundle.      US images were used to guide the biopsies of the prostate.  12 cores were taken with 6 on each side, 2 at the base,  2 at the midgland and  2 at the apex.  The patient tolerated the procedure well.      We will follow up with the results in 7-10 days and contact patient with these results.

## 2020-07-07 NOTE — NURSING NOTE
Chief Complaint   Patient presents with     Elevated PSA      Transrectal Ultrasound guided prostate biopsy                 Procedure was explained to patient prior to performing said procedure. The patient signed the consent form and all questions were answered prior to the procedure. Any pre-procedural antibiotics were given according to the performing physicians recommendation. Pt's information was confirmed on samples and samples were sent for analysis. Paient reviewed information on labels sent with patient and confirmed the accuracy of all the labels.    Consent read and signed: Yes   No Known Allergies  Performing Physician: Dr. Barrera  Antibiotic taken? YES  Aspirin or other blood thinning medications discontinued 7-10 days:  YES  Time of Fleet's enema: 11:40AM  Patient given Gentamicin intramuscular injection 30 minutes prior to procedure No    12 samples were taken from the right and left, medial and lateral base, mid, and apex of the prostate respectively.  NO additional samples were taken from  Vitals were repeated prior to patient leaving and instructions for post Transrectal Ultrasound guided Prostate Biopsy care were explained to the patient.      The following medication was given:     MEDICATION:  Lidocaine 2% Soln  ROUTE: RECTAL  SITE: PROSTATE  DOSE: 15ML  LOT #: [id number]  : [employer]  EXPIRATION DATE: 07/22  NDC#: 64932-819-20  Was there drug waste? Yes  Amount of drug waste (mL): 15ML.  Reason for waste:  Single use vial  Multi-dose vial: ALFRED Silva  July 7, 2020

## 2020-07-10 LAB — COPATH REPORT: NORMAL

## 2020-07-20 NOTE — NURSING NOTE
Chief Complaint   Patient presents with     Elevated PSA     Review biopsy results     Fior Jarvis EMT

## 2020-07-21 ENCOUNTER — VIRTUAL VISIT (OUTPATIENT)
Dept: UROLOGY | Facility: CLINIC | Age: 60
End: 2020-07-21
Payer: COMMERCIAL

## 2020-07-21 DIAGNOSIS — C61 PROSTATE CANCER (H): Primary | ICD-10-CM

## 2020-07-21 NOTE — LETTER
7/20/2020       RE: Jeramie Sánchez  [id number]  [place] [address]     Dear Colleague,    Thank you for referring your patient, Jeramie Sánchez, to the [facility] [place] at [facility]. Please see a copy of my visit note below.    Unable to reach patient      Again, thank you for allowing me to participate in the care of your patient.      Sincerely,    Power Barrera MD

## 2020-07-21 NOTE — LETTER
Date:July 24, 2020      Provider requested that no letter be sent. Do not send.       [facility] Information

## 2020-07-22 ENCOUNTER — TELEPHONE (OUTPATIENT)
Dept: INTERNAL MEDICINE | Facility: CLINIC | Age: 60
End: 2020-07-22

## 2020-07-22 NOTE — TELEPHONE ENCOUNTER
Patient had biopsy with Dr Barrera his urologist.  He is not wanting to do a video appt with him just to get the pathology results.   No one at the urology clinic will let him know his results without a video. He thinks this is injustice and doesn't want to pay for another office visit for just results. He would like to know if we can share his results. Please advise  Ok to call and nazia [phone]

## 2020-07-23 ENCOUNTER — TELEPHONE (OUTPATIENT)
Dept: SURGERY | Facility: CLINIC | Age: 60
End: 2020-07-23

## 2020-07-23 NOTE — TELEPHONE ENCOUNTER
Spoke with mom on the phone today regarding his prostate biopsy results  We went over treatment options briefly today as well.  He has felt well since his biopsy.  I will see him in clinic shortly to go over his treatment options in more detail

## 2020-08-10 ENCOUNTER — OFFICE VISIT (OUTPATIENT)
Dept: UROLOGY | Facility: CLINIC | Age: 60
End: 2020-08-10
Payer: COMMERCIAL

## 2020-08-10 VITALS
WEIGHT: 195 LBS | HEIGHT: 71 IN | DIASTOLIC BLOOD PRESSURE: 80 MMHG | BODY MASS INDEX: 27.3 KG/M2 | SYSTOLIC BLOOD PRESSURE: 122 MMHG

## 2020-08-10 DIAGNOSIS — C61 PROSTATE CANCER (H): ICD-10-CM

## 2020-08-10 DIAGNOSIS — N40.0 ENLARGED PROSTATE: ICD-10-CM

## 2020-08-10 DIAGNOSIS — R97.20 ELEVATED PROSTATE SPECIFIC ANTIGEN (PSA): Primary | ICD-10-CM

## 2020-08-10 LAB
ALBUMIN UR-MCNC: NEGATIVE MG/DL
APPEARANCE UR: CLEAR
BILIRUB UR QL STRIP: NEGATIVE
COLOR UR AUTO: YELLOW
GLUCOSE UR STRIP-MCNC: NEGATIVE MG/DL
HGB UR QL STRIP: NEGATIVE
KETONES UR STRIP-MCNC: NEGATIVE MG/DL
LEUKOCYTE ESTERASE UR QL STRIP: NEGATIVE
NITRATE UR QL: NEGATIVE
PH UR STRIP: 6 PH (ref 5–7)
RESIDUAL VOLUME (RV) (EXTERNAL): 6
SOURCE: NORMAL
SP GR UR STRIP: 1.02 (ref 1–1.03)
UROBILINOGEN UR STRIP-ACNC: 0.2 EU/DL (ref 0.2–1)

## 2020-08-10 PROCEDURE — 81003 URINALYSIS AUTO W/O SCOPE: CPT | Mod: QW | Performed by: UROLOGY

## 2020-08-10 PROCEDURE — 51798 US URINE CAPACITY MEASURE: CPT | Performed by: UROLOGY

## 2020-08-10 PROCEDURE — 99214 OFFICE O/P EST MOD 30 MIN: CPT | Mod: 25 | Performed by: UROLOGY

## 2020-08-10 RX ORDER — CEFAZOLIN SODIUM 2 G/50ML
2 SOLUTION INTRAVENOUS
Status: CANCELLED | OUTPATIENT
Start: 2020-08-10

## 2020-08-10 RX ORDER — CEFAZOLIN SODIUM 1 G/50ML
1 INJECTION, SOLUTION INTRAVENOUS SEE ADMIN INSTRUCTIONS
Status: CANCELLED | OUTPATIENT
Start: 2020-08-10

## 2020-08-10 ASSESSMENT — PAIN SCALES - GENERAL: PAINLEVEL: NO PAIN (0)

## 2020-08-10 ASSESSMENT — MIFFLIN-ST. JEOR: SCORE: 1703.7

## 2020-08-10 NOTE — NURSING NOTE
Chief Complaint   Patient presents with     Elevated PSA     Benign Prostatic Hypertrophy       PVR: 6 mL      Fior Jarvis, EMT

## 2020-08-10 NOTE — PROGRESS NOTES
Office Visit Note  [facility]  [phone]    UROLOGIC DIAGNOSES:   T1C Shahrzad 3+4=7 prostate cancer, enlarged prostate    CURRENT INTERVENTIONS:       HISTORY:   Juliocesar returns to clinic today.  For discussion of his prostate biopsy results.  He had an MRI of the prostate performed that showed no abnormalities but did show a very enlarged prostate measuring 100 g.  Because of his high PSA density he underwent a template prostate biopsy and revealed a [place] 3+4 equal 7 prostate cancer in 1 core along with a Shahrzad 3+3 equal 6 prostate cancer in 2 other cores.  He has felt well since his procedure.  Despite his enlarged prostate he has no urinary symptoms or complaints.  He reports normal erectile function and does not use Viagra or Cialis or other medications.  He is in good health and is very active.  He has no family history of prostate cancer.      PAST MEDICAL HISTORY:   Past Medical History:   Diagnosis Date     Arthritis      Hypertension        PAST SURGICAL HISTORY:   Past Surgical History:   Procedure Laterality Date     ARTHROPLASTY HIP ANTERIOR Left 11/25/2014    Procedure: ARTHROPLASTY HIP ANTERIOR;  Surgeon: Drew Phelps MD;  Location: [facility] OR     ENT SURGERY      jaw repair for dental reasons     PROSTATE SURGERY         FAMILY HISTORY:   Family History   Problem Relation Age of Onset     Other Cancer Mother      Heart Surgery Father      Diabetes No family hx of      Colon Cancer No family hx of        SOCIAL HISTORY:   Social History     Socioeconomic History     Marital status:      Spouse name: None     Number of children: None     Years of education: None     Highest education level: None   Occupational History     None   Social Needs     Financial resource strain: None     Food insecurity     Worry: None     Inability: None     Transportation needs     Medical: None     Non-medical: None   Tobacco Use     Smoking status: Never Smoker     Smokeless tobacco: Never Used  "  Substance and Sexual Activity     Alcohol use: Yes     Comment: 3 on weekends     Drug use: No     Sexual activity: Yes   Lifestyle     Physical activity     Days per week: None     Minutes per session: None     Stress: None   Relationships     Social connections     Talks on phone: None     Gets together: None     Attends Temple service: None     Active member of club or organization: None     Attends meetings of clubs or organizations: None     Relationship status: None     Intimate partner violence     Fear of current or ex partner: None     Emotionally abused: None     Physically abused: None     Forced sexual activity: None   Other Topics Concern     Parent/sibling w/ CABG, MI or angioplasty before 65F 55M? Not Asked   Social History Narrative     None       Review Of Systems:  Skin: No rash, pruritis, or skin pigmentation  Eyes: No changes in vision  Ears/Nose/Throat: No changes in hearing, no nosebleeds  Respiratory: No shortness of breath, dyspnea on exertion, cough, or hemoptysis  Cardiovascular: No chest pain or palpitations  Gastrointestinal: No diarrhea or constipation. No abdominal pain. No hematochezia  Genitourinary: see HPI  Musculoskeletal: No pain or swelling of joints, normal range of motion  Neurologic: No weakness or tremors  Psychiatric: No recent changes in memory or mood  Hematologic/Lymphatic/Immunologic: No easy bruising or enlarged lymph nodes  Endocrine: No weight gain or loss      PHYSICAL EXAM:    /80   Ht 1.791 m (5' 10.5\")   Wt 88.5 kg (195 lb)   BMI 27.58 kg/m      Constitutional: Well developed. Conversant and in no acute distress  Eyes: Anicteric sclera, conjunctiva clear, normal extraocular movements  ENT: Normocephalic and atraumatic,   Skin: Warm and dry. No rashes or lesions  Cardiac: No peripheral edema  Back/Flank: Not done  CNS/PNS: Normal musculature and movements, moves all extremities normally  Respiratory: Normal non-labored breathing  Abdomen: Soft " nontender and nondistended  Peripheral Vascular: No peripheral edema  Mental Status/Psych: Alert and Oriented x 3. Normal mood and affect    Penis: Not done  Scrotal Skin: Not done  Testicles: Not done  Epididymis: Not done  Digital Rectal Exam:     Cystoscopy: Not done    Imaging: None    Urinalysis: UA RESULTS:  Recent Labs   Lab Test 08/10/20  1605  11/14/18  1530   COLOR Yellow   < > Yellow   APPEARANCE Clear   < > Clear   URINEGLC Negative   < > Negative   URINEBILI Negative   < > Negative   URINEKETONE Negative   < > Negative   SG 1.020   < > 1.005   UBLD Negative   < > Negative   URINEPH 6.0   < > 5.5   PROTEIN Negative   < > Negative   UROBILINOGEN 0.2   < > 0.2   NITRITE Negative   < > Negative   LEUKEST Negative   < > Negative   RBCU  --   --  O - 2   WBCU  --   --  0 - 5    < > = values in this interval not displayed.       PSA: 12.9    Post Void Residual:     Other labs: None today      IMPRESSION:  Enlarged prostate, favorable intermediate risk prostate cancer    PLAN:  He has both an enlarged prostate and an intermediate risk prostate cancer.  We went over his prostate biopsy results in detail today.  We went over the natural history of prostate cancer.  We discussed his treatment options.  We discussed active surveillance as a treatment option but given his intermediate risk cancer and his very good health I would not recommend this option.  His father is 91 years old as well and in good health.  I recommended treatment for prostate cancer.  As his ideal treatment options I recommended he consider either a robotic assisted laparoscopic radical prostatectomy or radiotherapy combined with 6 months of hormonal therapy.  We did discussed ablative treatment options as well.  We discussed the pros and cons of each treatment.  After our discussion he wishes to proceed with a robotic assisted laparoscopic radical prostatectomy.  We discussed the surgery in detail today along with its risks and expected  recovery.  He wishes to proceed.  He will have a bilateral  Procedure performed.  We will get him scheduled in the operating room.    Total Time: 30 min                                      Total in Consultation: 30 min      Power Barrera M.D.

## 2020-08-10 NOTE — LETTER
8/10/2020       RE: Jeramie Sánchez  [address]  [place] [facility] [address]     Dear Colleague,    Thank you for referring your patient, Jeramie Sánchez, to the [facility] [place] at [facility]. Please see a copy of my visit note below.    Office Visit Note  [facility]  [phone]    UROLOGIC DIAGNOSES:   T1C Hawley 3+4=7 prostate cancer, enlarged prostate    CURRENT INTERVENTIONS:       HISTORY:   Juliocesar returns to clinic today.  For discussion of his prostate biopsy results.  He had an MRI of the prostate performed that showed no abnormalities but did show a very enlarged prostate measuring 100 g.  Because of his high PSA density he underwent a template prostate biopsy and revealed a Hawley 3+4 equal 7 prostate cancer in 1 core along with a Shahrzad 3+3 equal 6 prostate cancer in 2 other cores.  He has felt well since his procedure.  Despite his enlarged prostate he has no urinary symptoms or complaints.  He reports normal erectile function and does not use Viagra or Cialis or other medications.  He is in good health and is very active.  He has no family history of prostate cancer.      PAST MEDICAL HISTORY:   Past Medical History:   Diagnosis Date     Arthritis      Hypertension        PAST SURGICAL HISTORY:   Past Surgical History:   Procedure Laterality Date     ARTHROPLASTY HIP ANTERIOR Left 11/25/2014    Procedure: ARTHROPLASTY HIP ANTERIOR;  Surgeon: Drew Phelps MD;  Location: [facility] OR     ENT SURGERY      jaw repair for dental reasons     PROSTATE SURGERY         FAMILY HISTORY:   Family History   Problem Relation Age of Onset     Other Cancer Mother      Heart Surgery Father      Diabetes No family hx of      Colon Cancer No family hx of        SOCIAL HISTORY:   Social History     Socioeconomic History     Marital status:      Spouse name: None     Number of children: None     Years of education:  "None     Highest education level: None   Occupational History     None   Social Needs     Financial resource strain: None     Food insecurity     Worry: None     Inability: None     Transportation needs     Medical: None     Non-medical: None   Tobacco Use     Smoking status: Never Smoker     Smokeless tobacco: Never Used   Substance and Sexual Activity     Alcohol use: Yes     Comment: 3 on weekends     Drug use: No     Sexual activity: Yes   Lifestyle     Physical activity     Days per week: None     Minutes per session: None     Stress: None   Relationships     Social connections     Talks on phone: None     Gets together: None     Attends Buddhism service: None     Active member of club or organization: None     Attends meetings of clubs or organizations: None     Relationship status: None     Intimate partner violence     Fear of current or ex partner: None     Emotionally abused: None     Physically abused: None     Forced sexual activity: None   Other Topics Concern     Parent/sibling w/ CABG, MI or angioplasty before 65F 55M? Not Asked   Social History Narrative     None       Review Of Systems:  Skin: No rash, pruritis, or skin pigmentation  Eyes: No changes in vision  Ears/Nose/Throat: No changes in hearing, no nosebleeds  Respiratory: No shortness of breath, dyspnea on exertion, cough, or hemoptysis  Cardiovascular: No chest pain or palpitations  Gastrointestinal: No diarrhea or constipation. No abdominal pain. No hematochezia  Genitourinary: see HPI  Musculoskeletal: No pain or swelling of joints, normal range of motion  Neurologic: No weakness or tremors  Psychiatric: No recent changes in memory or mood  Hematologic/Lymphatic/Immunologic: No easy bruising or enlarged lymph nodes  Endocrine: No weight gain or loss      PHYSICAL EXAM:    /80   Ht 1.791 m (5' 10.5\")   Wt 88.5 kg (195 lb)   BMI 27.58 kg/m      Constitutional: Well developed. Conversant and in no acute distress  Eyes: Anicteric " sclera, conjunctiva clear, normal extraocular movements  ENT: Normocephalic and atraumatic,   Skin: Warm and dry. No rashes or lesions  Cardiac: No peripheral edema  Back/Flank: Not done  CNS/PNS: Normal musculature and movements, moves all extremities normally  Respiratory: Normal non-labored breathing  Abdomen: Soft nontender and nondistended  Peripheral Vascular: No peripheral edema  Mental Status/Psych: Alert and Oriented x 3. Normal mood and affect    Penis: Not done  Scrotal Skin: Not done  Testicles: Not done  Epididymis: Not done  Digital Rectal Exam:     Cystoscopy: Not done    Imaging: None    Urinalysis: UA RESULTS:  Recent Labs   Lab Test 08/10/20  1605  11/14/18  1530   COLOR Yellow   < > Yellow   APPEARANCE Clear   < > Clear   URINEGLC Negative   < > Negative   URINEBILI Negative   < > Negative   URINEKETONE Negative   < > Negative   SG 1.020   < > 1.005   UBLD Negative   < > Negative   URINEPH 6.0   < > 5.5   PROTEIN Negative   < > Negative   UROBILINOGEN 0.2   < > 0.2   NITRITE Negative   < > Negative   LEUKEST Negative   < > Negative   RBCU  --   --  O - 2   WBCU  --   --  0 - 5    < > = values in this interval not displayed.       PSA: 12.9    Post Void Residual:     Other labs: None today      IMPRESSION:  Enlarged prostate, favorable intermediate risk prostate cancer    PLAN:  He has both an enlarged prostate and an intermediate risk prostate cancer.  We went over his prostate biopsy results in detail today.  We went over the natural history of prostate cancer.  We discussed his treatment options.  We discussed active surveillance as a treatment option but given his intermediate risk cancer and his very good health I would not recommend this option.  His father is 91 years old as well and in good health.  I recommended treatment for prostate cancer.  As his ideal treatment options I recommended he consider either a robotic assisted laparoscopic radical prostatectomy or radiotherapy combined with  6 months of hormonal therapy.  We did discussed ablative treatment options as well.  We discussed the pros and cons of each treatment.  After our discussion he wishes to proceed with a robotic assisted laparoscopic radical prostatectomy.  We discussed the surgery in detail today along with its risks and expected recovery.  He wishes to proceed.  He will have a bilateral  Procedure performed.  We will get him scheduled in the operating room.    Total Time: 30 min                                      Total in Consultation: 30 min      Power Barrera M.D.

## 2020-08-13 DIAGNOSIS — Z11.59 ENCOUNTER FOR SCREENING FOR OTHER VIRAL DISEASES: Primary | ICD-10-CM

## 2020-08-13 PROBLEM — C61 PROSTATE CANCER (H): Status: ACTIVE | Noted: 2020-08-13

## 2020-10-20 DIAGNOSIS — Z11.59 ENCOUNTER FOR SCREENING FOR OTHER VIRAL DISEASES: Primary | ICD-10-CM

## 2020-11-30 ENCOUNTER — OFFICE VISIT (OUTPATIENT)
Dept: INTERNAL MEDICINE | Facility: CLINIC | Age: 60
End: 2020-11-30
Payer: COMMERCIAL

## 2020-11-30 VITALS
OXYGEN SATURATION: 100 % | HEIGHT: 71 IN | SYSTOLIC BLOOD PRESSURE: 122 MMHG | BODY MASS INDEX: 27.82 KG/M2 | HEART RATE: 72 BPM | RESPIRATION RATE: 13 BRPM | TEMPERATURE: 98.3 F | WEIGHT: 198.7 LBS | DIASTOLIC BLOOD PRESSURE: 74 MMHG

## 2020-11-30 DIAGNOSIS — C61 PROSTATE CANCER (H): ICD-10-CM

## 2020-11-30 DIAGNOSIS — I10 ESSENTIAL HYPERTENSION: ICD-10-CM

## 2020-11-30 DIAGNOSIS — Z01.818 PREOP GENERAL PHYSICAL EXAM: Primary | ICD-10-CM

## 2020-11-30 LAB
ERYTHROCYTE [DISTWIDTH] IN BLOOD BY AUTOMATED COUNT: 13.4 % (ref 10–15)
HCT VFR BLD AUTO: 38.2 % (ref 40–53)
HGB BLD-MCNC: 12.5 G/DL (ref 13.3–17.7)
MCH RBC QN AUTO: 32.6 PG (ref 26.5–33)
MCHC RBC AUTO-ENTMCNC: 32.7 G/DL (ref 31.5–36.5)
MCV RBC AUTO: 100 FL (ref 78–100)
PLATELET # BLD AUTO: 161 10E9/L (ref 150–450)
RBC # BLD AUTO: 3.84 10E12/L (ref 4.4–5.9)
WBC # BLD AUTO: 3.8 10E9/L (ref 4–11)

## 2020-11-30 PROCEDURE — 93000 ELECTROCARDIOGRAM COMPLETE: CPT | Performed by: INTERNAL MEDICINE

## 2020-11-30 PROCEDURE — 36415 COLL VENOUS BLD VENIPUNCTURE: CPT | Performed by: INTERNAL MEDICINE

## 2020-11-30 PROCEDURE — 99215 OFFICE O/P EST HI 40 MIN: CPT | Performed by: INTERNAL MEDICINE

## 2020-11-30 PROCEDURE — 85027 COMPLETE CBC AUTOMATED: CPT | Performed by: INTERNAL MEDICINE

## 2020-11-30 PROCEDURE — 80053 COMPREHEN METABOLIC PANEL: CPT | Performed by: INTERNAL MEDICINE

## 2020-11-30 ASSESSMENT — MIFFLIN-ST. JEOR: SCORE: 1720.49

## 2020-11-30 NOTE — LETTER
[facility]  [address], [address]  [place] [address]  [phone]        December 1, 2020    Jeramie Sánchez  [address] [address] [address]            Dear Mr. Jeramie Sánchez:      The results of your recent labs show Mild anemia. Recommend to follow up in 3 months for recheck.   If you have any further questions or problems, please contact our office.    Sincerely,        Maninder Espinoza M.D.    Results for orders placed or performed in visit on 11/30/20   CBC with platelets     Status: Abnormal   Result Value Ref Range    WBC 3.8 (L) 4.0 - 11.0 10e9/L    RBC Count 3.84 (L) 4.4 - 5.9 10e12/L    Hemoglobin 12.5 (L) 13.3 - 17.7 g/dL    Hematocrit 38.2 (L) 40.0 - 53.0 %     78 - 100 fl    MCH 32.6 26.5 - 33.0 pg    MCHC 32.7 31.5 - 36.5 g/dL    RDW 13.4 10.0 - 15.0 %    Platelet Count 161 150 - 450 10e9/L   Comprehensive metabolic panel     Status: Abnormal   Result Value Ref Range    Sodium 139 133 - 144 mmol/L    Potassium 4.1 3.4 - 5.3 mmol/L    Chloride 106 94 - 109 mmol/L    Carbon Dioxide 29 20 - 32 mmol/L    Anion Gap 4 3 - 14 mmol/L    Glucose 109 (H) 70 - 99 mg/dL    Urea Nitrogen 21 7 - 30 mg/dL    Creatinine 1.17 0.66 - 1.25 mg/dL    GFR Estimate 67 >60 mL/min/[1.73_m2]    GFR Estimate If Black 77 >60 mL/min/[1.73_m2]    Calcium 9.2 8.5 - 10.1 mg/dL    Bilirubin Total 0.4 0.2 - 1.3 mg/dL    Albumin 4.0 3.4 - 5.0 g/dL    Protein Total 7.5 6.8 - 8.8 g/dL    Alkaline Phosphatase 71 40 - 150 U/L    ALT 22 0 - 70 U/L    AST 15 0 - 45 U/L

## 2020-11-30 NOTE — PROGRESS NOTES
[place] [address]  [facility] [address]  [phone]  Dept: [phone]    PRE-OP EVALUATION:  Today's date: 2020    Jeramie Sánchez (: 1960) presents for pre-operative evaluation assessment as requested by Dr. Barrera.  He requires evaluation and anesthesia risk assessment prior to undergoing surgery/procedure for treatment of  PROSTATECTOMY, ROBOT-ASSISTED, WITH PELVIC LYMPHADENECTOMY, POSSIBLE OPEN  Proposed Surgery/ Procedure: PROSTATECTOMY, ROBOT-ASSISTED, WITH PELVIC LYMPHADENECTOMY, POSSIBLE  Date of Surgery/ Procedure: 20  Time of Surgery/ Procedure: 12:15  Hospital/Surgical Facility: [facility]  Surgery Fax Number: Note does not need to be faxed, will be available electronically in Epic.  Primary Physician: Maninder Espinoza  Type of Anesthesia Anticipated: General    Preoperative Questionnaire:   No - Have you ever had a heart attack or stroke?  No - Have you ever had surgery on your heart or blood vessels, such as a stent, coronary (heart) bypass, or surgery on an artery in the head, neck, heart, or legs?  No - Do you have chest pain when you are physically active?  No - Do you have a history of heart failure?  No - Do you currently have a cold, bronchitis, or symptoms of other respiratory (head and chest) infections?  No - Do you have a cough, shortness of breath, or wheezing?  No - Do you or anyone in your family have a history of blood clots?  No - Do you or anyone in your family have a serious bleeding problem, such as long-lasting bleeding after surgeries or cuts?  No - Have you ever had anemia or been told to take iron pills?  No - Have you had any abnormal blood loss such as black, tarry or bloody stools, or abnormal vaginal bleeding?  No - Have you ever had a blood transfusion?  Yes - Are you willing to have a blood transfusion if it is medically needed before, during, or after your surgery?  No - Have you or anyone in your  family ever had problems with anesthesia (sedation for surgery)?  Yes - Do you have sleep apnea, excessive snoring, or daytime drowsiness?   No - Do you have any artifical heart valves or other implanted medical devices, such as a pacemaker, defibrillator, or continuous glucose monitor?  No - Do you have any artifical joints?  No - Are you allergic to latex?  No - Is there any chance that you may be pregnant?    Patient has a Health Care Directive or Living Will:  NO    HPI:     HPI related to upcoming procedure: scheduled for robotic prostatectomy for history of prostate cancer.   Has h/o HTN. on medical treatment. BP has been controlled. No side effects from medications. No CP, HA, dizziness. good compliance with medications and low salt diet.  No acute complaints, no medication change or new medical conditions.        See problem list for active medical problems.  Problems all longstanding and stable, except as noted/documented.  See ROS for pertinent symptoms related to these conditions.      MEDICAL HISTORY:     Patient Active Problem List    Diagnosis Date Noted     Prostate cancer (H) 08/13/2020     Priority: Medium     Added automatically from request for surgery [id number]       JASSON (obstructive sleep apnea) 09/18/2018     Priority: Medium     HST, 9/17/2018, AHI: 57        Essential hypertension 01/05/2016     Priority: Medium     Hip pain 11/25/2014     Priority: Medium      Past Medical History:   Diagnosis Date     Arthritis      Hypertension      Past Surgical History:   Procedure Laterality Date     ARTHROPLASTY HIP ANTERIOR Left 11/25/2014    Procedure: ARTHROPLASTY HIP ANTERIOR;  Surgeon: Drew Phelps MD;  Location:  OR     ENT SURGERY      jaw repair for dental reasons     PROSTATE SURGERY       Current Outpatient Medications   Medication Sig Dispense Refill     amoxicillin (AMOXIL) 500 MG capsule Bring to clinic in original bottle where you will be instructed to take medication. (Patient not  taking: Reported on 4/27/2020) 4 capsule 0     cloNIDine (CATAPRES) 0.1 MG tablet Bring to clinic in original bottle where you will be instructed to take the medication. (Patient not taking: Reported on 7/7/2020) 1 tablet 0     lisinopril-hydrochlorothiazide (ZESTORETIC) 20-25 MG tablet Take 1 tablet by mouth daily 90 tablet 3     LORazepam (ATIVAN) 1 MG tablet Bring to clinic in original bottle where you will be instructed to take the medication. (Patient not taking: Reported on 7/7/2020) 3 tablet 0     multivitamin, therapeutic with minerals (MULTI-VITAMIN) TABS Take 1 tablet by mouth daily       OTC products: None, except as noted above    No Known Allergies   Latex Allergy: NO    Social History     Tobacco Use     Smoking status: Never Smoker     Smokeless tobacco: Never Used   Substance Use Topics     Alcohol use: Yes     Comment: 3 on weekends     History   Drug Use No       REVIEW OF SYSTEMS:   CONSTITUTIONAL: NEGATIVE for fever, chills, change in weight  INTEGUMENTARY/SKIN: NEGATIVE for worrisome rashes, moles or lesions  EYES: NEGATIVE for vision changes or irritation  ENT/MOUTH: NEGATIVE for ear, mouth and throat problems  RESP: NEGATIVE for significant cough or SOB  BREAST: NEGATIVE for masses, tenderness or discharge  CV: NEGATIVE for chest pain, palpitations or peripheral edema  GI: NEGATIVE for nausea, abdominal pain, heartburn, or change in bowel habits  : NEGATIVE for frequency, dysuria, or hematuria  MUSCULOSKELETAL: NEGATIVE for significant arthralgias or myalgia  NEURO: NEGATIVE for weakness, dizziness or paresthesias  ENDOCRINE: NEGATIVE for temperature intolerance, skin/hair changes  HEME: NEGATIVE for bleeding problems  PSYCHIATRIC: NEGATIVE for changes in mood or affect    EXAM:   There were no vitals taken for this visit.    GENERAL APPEARANCE: healthy, alert and no distress     EYES: EOMI,  PERRL     HENT: ear canals and TM's normal and nose and mouth without ulcers or lesions     NECK: no  adenopathy, no asymmetry, masses, or scars and thyroid normal to palpation     RESP: lungs clear to auscultation - no rales, rhonchi or wheezes     CV: regular rates and rhythm, normal S1 S2, no S3 or S4 and no murmur, click or rub     ABDOMEN:  soft, nontender, no HSM or masses and bowel sounds normal     MS: extremities normal- no gross deformities noted, no evidence of inflammation in joints, FROM in all extremities.     SKIN: no suspicious lesions or rashes     NEURO: Normal strength and tone, sensory exam grossly normal, mentation intact and speech normal     PSYCH: mentation appears normal. and affect normal/bright     LYMPHATICS: No cervical adenopathy    DIAGNOSTICS:     EKG: appears normal, NSR, normal axis, normal intervals, no acute ST/T changes c/w ischemia, no LVH by voltage criteria, unchanged from previous tracings  Labs Drawn and in Process:   Unresulted Labs Ordered in the Past 30 Days of this Admission     No orders found from 10/31/2020 to 12/1/2020.          Recent Labs   Lab Test 03/13/20  1047 01/17/19  2100   HGB 12.1* 10.6*    321    142   POTASSIUM 4.4 3.9   CR 1.24 1.22        IMPRESSION:   Reason for surgery/procedure: prostate cancer , prostatectomy   Diagnosis/reason for consult: preoperative evaluation/ clearance      The proposed surgical procedure is considered INTERMEDIATE risk.    REVISED CARDIAC RISK INDEX  The patient has the following serious cardiovascular risks for perioperative complications such as (MI, PE, VFib and 3  AV Block):  No serious cardiac risks  INTERPRETATION: 0 risks: Class I (very low risk - 0.4% complication rate)    The patient has the following additional risks for perioperative complications:  No identified additional risks      ICD-10-CM    1. Preop general physical exam  Z01.818        RECOMMENDATIONS:         --Patient is to take all scheduled medications on the day of surgery EXCEPT for modifications listed below.  Hold lisinopril  hydrochlorothiazide on the day of surgery   Stop taking NSAIDs one week prior to surgery     APPROVAL GIVEN to proceed with proposed procedure, without further diagnostic evaluation       Signed Electronically by: Maninder Espinoza MD    Copy of this evaluation report is provided to requesting physician.    [place] Preop Guidelines    Revised Cardiac Risk Index

## 2020-11-30 NOTE — PATIENT INSTRUCTIONS

## 2020-12-01 LAB
ALBUMIN SERPL-MCNC: 4 G/DL (ref 3.4–5)
ALP SERPL-CCNC: 71 U/L (ref 40–150)
ALT SERPL W P-5'-P-CCNC: 22 U/L (ref 0–70)
ANION GAP SERPL CALCULATED.3IONS-SCNC: 4 MMOL/L (ref 3–14)
AST SERPL W P-5'-P-CCNC: 15 U/L (ref 0–45)
BILIRUB SERPL-MCNC: 0.4 MG/DL (ref 0.2–1.3)
BUN SERPL-MCNC: 21 MG/DL (ref 7–30)
CALCIUM SERPL-MCNC: 9.2 MG/DL (ref 8.5–10.1)
CHLORIDE SERPL-SCNC: 106 MMOL/L (ref 94–109)
CO2 SERPL-SCNC: 29 MMOL/L (ref 20–32)
CREAT SERPL-MCNC: 1.17 MG/DL (ref 0.66–1.25)
GFR SERPL CREATININE-BSD FRML MDRD: 67 ML/MIN/{1.73_M2}
GLUCOSE SERPL-MCNC: 109 MG/DL (ref 70–99)
POTASSIUM SERPL-SCNC: 4.1 MMOL/L (ref 3.4–5.3)
PROT SERPL-MCNC: 7.5 G/DL (ref 6.8–8.8)
SODIUM SERPL-SCNC: 139 MMOL/L (ref 133–144)

## 2020-12-10 NOTE — OR NURSING
Lab work done with preop physical on 11/30/20 shows Hgb 12.5, hematocrit 38.2 & RBC 3.84.    T & S to be done per MDA on day of surgery.  Dr. Barrera's office notified.

## 2020-12-13 DIAGNOSIS — Z11.59 ENCOUNTER FOR SCREENING FOR OTHER VIRAL DISEASES: ICD-10-CM

## 2020-12-13 LAB
SARS-COV-2 RNA SPEC QL NAA+PROBE: NORMAL
SPECIMEN SOURCE: NORMAL

## 2020-12-13 PROCEDURE — U0003 INFECTIOUS AGENT DETECTION BY NUCLEIC ACID (DNA OR RNA); SEVERE ACUTE RESPIRATORY SYNDROME CORONAVIRUS 2 (SARS-COV-2) (CORONAVIRUS DISEASE [COVID-19]), AMPLIFIED PROBE TECHNIQUE, MAKING USE OF HIGH THROUGHPUT TECHNOLOGIES AS DESCRIBED BY CMS-2020-01-R: HCPCS | Performed by: UROLOGY

## 2020-12-13 NOTE — PHARMACY-ADMISSION MEDICATION HISTORY
Admission medication history interview status for this patient is complete. See [facility] admission navigator for allergy information, prior to admission medications and immunization status.     PTA meds completed by pre-admitting nurse, Shireen Alvarado RN, and reviewed by pharmacy.    Prior to Admission medications    Medication Sig Last Dose Taking? Auth Provider   lisinopril-hydrochlorothiazide (ZESTORETIC) 20-25 MG tablet Take 1 tablet by mouth daily  Yes Maninder Espinoza MD   multivitamin, therapeutic with minerals (MULTI-VITAMIN) TABS Take 1 tablet by mouth daily  Yes Reported, Patient

## 2020-12-14 LAB
LABORATORY COMMENT REPORT: NORMAL
SARS-COV-2 RNA SPEC QL NAA+PROBE: NEGATIVE
SPECIMEN SOURCE: NORMAL

## 2020-12-16 ENCOUNTER — APPOINTMENT (OUTPATIENT)
Dept: GENERAL RADIOLOGY | Facility: CLINIC | Age: 60
End: 2020-12-16
Attending: ANESTHESIOLOGY
Payer: COMMERCIAL

## 2020-12-16 ENCOUNTER — HOSPITAL ENCOUNTER (OUTPATIENT)
Facility: CLINIC | Age: 60
LOS: 1 days | Discharge: HOME | End: 2020-12-17
Attending: UROLOGY | Admitting: UROLOGY
Payer: COMMERCIAL

## 2020-12-16 ENCOUNTER — ANESTHESIA (OUTPATIENT)
Dept: SURGERY | Facility: CLINIC | Age: 60
End: 2020-12-16
Payer: COMMERCIAL

## 2020-12-16 ENCOUNTER — ANESTHESIA EVENT (OUTPATIENT)
Dept: SURGERY | Facility: CLINIC | Age: 60
End: 2020-12-16
Payer: COMMERCIAL

## 2020-12-16 DIAGNOSIS — C61 PROSTATE CANCER (H): Primary | ICD-10-CM

## 2020-12-16 LAB
ABO + RH BLD: NORMAL
ABO + RH BLD: NORMAL
ANION GAP SERPL CALCULATED.3IONS-SCNC: 5 MMOL/L (ref 3–14)
BLD GP AB SCN SERPL QL: NORMAL
BLOOD BANK CMNT PATIENT-IMP: NORMAL
BUN SERPL-MCNC: 19 MG/DL (ref 7–30)
CALCIUM SERPL-MCNC: 8.9 MG/DL (ref 8.5–10.1)
CHLORIDE SERPL-SCNC: 105 MMOL/L (ref 94–109)
CO2 SERPL-SCNC: 29 MMOL/L (ref 20–32)
CREAT SERPL-MCNC: 1.16 MG/DL (ref 0.66–1.25)
ERYTHROCYTE [DISTWIDTH] IN BLOOD BY AUTOMATED COUNT: 13 % (ref 10–15)
GFR SERPL CREATININE-BSD FRML MDRD: 67 ML/MIN/{1.73_M2}
GLUCOSE SERPL-MCNC: 147 MG/DL (ref 70–99)
HCT VFR BLD AUTO: 38.9 % (ref 40–53)
HGB BLD-MCNC: 12.2 G/DL (ref 13.3–17.7)
MCH RBC QN AUTO: 32.3 PG (ref 26.5–33)
MCHC RBC AUTO-ENTMCNC: 31.4 G/DL (ref 31.5–36.5)
MCV RBC AUTO: 103 FL (ref 78–100)
PLATELET # BLD AUTO: 182 10E9/L (ref 150–450)
POTASSIUM SERPL-SCNC: 4.3 MMOL/L (ref 3.4–5.3)
RBC # BLD AUTO: 3.78 10E12/L (ref 4.4–5.9)
SODIUM SERPL-SCNC: 139 MMOL/L (ref 133–144)
SPECIMEN EXP DATE BLD: NORMAL
WBC # BLD AUTO: 8.4 10E9/L (ref 4–11)

## 2020-12-16 PROCEDURE — 86850 RBC ANTIBODY SCREEN: CPT | Performed by: ANESTHESIOLOGY

## 2020-12-16 PROCEDURE — 258N000003 HC RX IP 258 OP 636: Performed by: NURSE ANESTHETIST, CERTIFIED REGISTERED

## 2020-12-16 PROCEDURE — 250N000013 HC RX MED GY IP 250 OP 250 PS 637: Performed by: STUDENT IN AN ORGANIZED HEALTH CARE EDUCATION/TRAINING PROGRAM

## 2020-12-16 PROCEDURE — 88307 TISSUE EXAM BY PATHOLOGIST: CPT | Mod: TC | Performed by: UROLOGY

## 2020-12-16 PROCEDURE — 86901 BLOOD TYPING SEROLOGIC RH(D): CPT | Performed by: ANESTHESIOLOGY

## 2020-12-16 PROCEDURE — 88307 TISSUE EXAM BY PATHOLOGIST: CPT | Mod: 26

## 2020-12-16 PROCEDURE — 250N000011 HC RX IP 250 OP 636: Performed by: NURSE ANESTHETIST, CERTIFIED REGISTERED

## 2020-12-16 PROCEDURE — 258N000003 HC RX IP 258 OP 636: Performed by: ANESTHESIOLOGY

## 2020-12-16 PROCEDURE — 36415 COLL VENOUS BLD VENIPUNCTURE: CPT | Performed by: STUDENT IN AN ORGANIZED HEALTH CARE EDUCATION/TRAINING PROGRAM

## 2020-12-16 PROCEDURE — 38571 LAPAROSCOPY LYMPHADENECTOMY: CPT | Mod: 51 | Performed by: UROLOGY

## 2020-12-16 PROCEDURE — 250N000011 HC RX IP 250 OP 636: Performed by: UROLOGY

## 2020-12-16 PROCEDURE — 370N000001 HC ANESTHESIA TECHNICAL FEE, 1ST 30 MIN: Performed by: UROLOGY

## 2020-12-16 PROCEDURE — 370N000002 HC ANESTHESIA TECHNICAL FEE, EACH ADDTL 15 MIN: Performed by: UROLOGY

## 2020-12-16 PROCEDURE — 250N000009 HC RX 250: Performed by: NURSE ANESTHETIST, CERTIFIED REGISTERED

## 2020-12-16 PROCEDURE — 85027 COMPLETE CBC AUTOMATED: CPT | Performed by: STUDENT IN AN ORGANIZED HEALTH CARE EDUCATION/TRAINING PROGRAM

## 2020-12-16 PROCEDURE — 88305 TISSUE EXAM BY PATHOLOGIST: CPT | Mod: 26

## 2020-12-16 PROCEDURE — 761N000002 HC RECOVERY PHASE 1 LEVEL 1 EA ADDTL HR: Performed by: UROLOGY

## 2020-12-16 PROCEDURE — 250N000011 HC RX IP 250 OP 636: Performed by: STUDENT IN AN ORGANIZED HEALTH CARE EDUCATION/TRAINING PROGRAM

## 2020-12-16 PROCEDURE — 36415 COLL VENOUS BLD VENIPUNCTURE: CPT | Performed by: ANESTHESIOLOGY

## 2020-12-16 PROCEDURE — 761N000001 HC RECOVERY PHASE 1 LEVEL 1 FIRST HR: Performed by: UROLOGY

## 2020-12-16 PROCEDURE — 250N000011 HC RX IP 250 OP 636: Performed by: ANESTHESIOLOGY

## 2020-12-16 PROCEDURE — 88309 TISSUE EXAM BY PATHOLOGIST: CPT | Mod: 26

## 2020-12-16 PROCEDURE — 360N000069 HC SURGERY LEVEL 8 EA 15 ADDTL MIN: Performed by: UROLOGY

## 2020-12-16 PROCEDURE — 86900 BLOOD TYPING SEROLOGIC ABO: CPT | Performed by: ANESTHESIOLOGY

## 2020-12-16 PROCEDURE — 88305 TISSUE EXAM BY PATHOLOGIST: CPT | Mod: TC | Performed by: UROLOGY

## 2020-12-16 PROCEDURE — 96372 THER/PROPH/DIAG INJ SC/IM: CPT | Performed by: STUDENT IN AN ORGANIZED HEALTH CARE EDUCATION/TRAINING PROGRAM

## 2020-12-16 PROCEDURE — 272N000001 HC OR GENERAL SUPPLY STERILE: Performed by: UROLOGY

## 2020-12-16 PROCEDURE — 55866 LAPS SURG PRST8ECT RPBIC RAD: CPT | Performed by: UROLOGY

## 2020-12-16 PROCEDURE — 258N000003 HC RX IP 258 OP 636: Performed by: STUDENT IN AN ORGANIZED HEALTH CARE EDUCATION/TRAINING PROGRAM

## 2020-12-16 PROCEDURE — 360N000068 HC SURGERY LEVEL 8 1ST 30 MIN: Performed by: UROLOGY

## 2020-12-16 PROCEDURE — 80048 BASIC METABOLIC PNL TOTAL CA: CPT | Performed by: STUDENT IN AN ORGANIZED HEALTH CARE EDUCATION/TRAINING PROGRAM

## 2020-12-16 PROCEDURE — 71045 X-RAY EXAM CHEST 1 VIEW: CPT

## 2020-12-16 PROCEDURE — 88309 TISSUE EXAM BY PATHOLOGIST: CPT | Mod: TC | Performed by: UROLOGY

## 2020-12-16 PROCEDURE — 999N000135 HC STATISTIC PRE PROC ASSESS I: Performed by: UROLOGY

## 2020-12-16 RX ORDER — POLYETHYLENE GLYCOL 3350 17 G/17G
17 POWDER, FOR SOLUTION ORAL DAILY
Status: DISCONTINUED | OUTPATIENT
Start: 2020-12-16 | End: 2020-12-17 | Stop reason: HOSPADM

## 2020-12-16 RX ORDER — FAMOTIDINE 20 MG/1
20 TABLET, FILM COATED ORAL 2 TIMES DAILY
Status: DISCONTINUED | OUTPATIENT
Start: 2020-12-16 | End: 2020-12-17 | Stop reason: HOSPADM

## 2020-12-16 RX ORDER — SODIUM CHLORIDE, SODIUM LACTATE, POTASSIUM CHLORIDE, CALCIUM CHLORIDE 600; 310; 30; 20 MG/100ML; MG/100ML; MG/100ML; MG/100ML
INJECTION, SOLUTION INTRAVENOUS CONTINUOUS
Status: DISCONTINUED | OUTPATIENT
Start: 2020-12-16 | End: 2020-12-16 | Stop reason: HOSPADM

## 2020-12-16 RX ORDER — LIDOCAINE 40 MG/G
CREAM TOPICAL
Status: DISCONTINUED | OUTPATIENT
Start: 2020-12-16 | End: 2020-12-17 | Stop reason: HOSPADM

## 2020-12-16 RX ORDER — ONDANSETRON 2 MG/ML
4 INJECTION INTRAMUSCULAR; INTRAVENOUS EVERY 30 MIN PRN
Status: DISCONTINUED | OUTPATIENT
Start: 2020-12-16 | End: 2020-12-16 | Stop reason: HOSPADM

## 2020-12-16 RX ORDER — HYDROMORPHONE HYDROCHLORIDE 1 MG/ML
.2-.3 INJECTION, SOLUTION INTRAMUSCULAR; INTRAVENOUS; SUBCUTANEOUS
Status: DISCONTINUED | OUTPATIENT
Start: 2020-12-16 | End: 2020-12-17 | Stop reason: HOSPADM

## 2020-12-16 RX ORDER — NALOXONE HYDROCHLORIDE 0.4 MG/ML
0.2 INJECTION, SOLUTION INTRAMUSCULAR; INTRAVENOUS; SUBCUTANEOUS
Status: DISCONTINUED | OUTPATIENT
Start: 2020-12-16 | End: 2020-12-16 | Stop reason: HOSPADM

## 2020-12-16 RX ORDER — HEPARIN SODIUM 5000 [USP'U]/.5ML
5000 INJECTION, SOLUTION INTRAVENOUS; SUBCUTANEOUS EVERY 8 HOURS
Status: DISCONTINUED | OUTPATIENT
Start: 2020-12-16 | End: 2020-12-17 | Stop reason: HOSPADM

## 2020-12-16 RX ORDER — ONDANSETRON 4 MG/1
4 TABLET, ORALLY DISINTEGRATING ORAL EVERY 30 MIN PRN
Status: DISCONTINUED | OUTPATIENT
Start: 2020-12-16 | End: 2020-12-16 | Stop reason: HOSPADM

## 2020-12-16 RX ORDER — NALOXONE HYDROCHLORIDE 0.4 MG/ML
0.2 INJECTION, SOLUTION INTRAMUSCULAR; INTRAVENOUS; SUBCUTANEOUS
Status: DISCONTINUED | OUTPATIENT
Start: 2020-12-16 | End: 2020-12-17 | Stop reason: HOSPADM

## 2020-12-16 RX ORDER — ONDANSETRON 4 MG/1
4 TABLET, ORALLY DISINTEGRATING ORAL EVERY 6 HOURS PRN
Status: DISCONTINUED | OUTPATIENT
Start: 2020-12-16 | End: 2020-12-17 | Stop reason: HOSPADM

## 2020-12-16 RX ORDER — NALOXONE HYDROCHLORIDE 0.4 MG/ML
0.4 INJECTION, SOLUTION INTRAMUSCULAR; INTRAVENOUS; SUBCUTANEOUS
Status: DISCONTINUED | OUTPATIENT
Start: 2020-12-16 | End: 2020-12-16 | Stop reason: HOSPADM

## 2020-12-16 RX ORDER — LABETALOL HYDROCHLORIDE 5 MG/ML
INJECTION, SOLUTION INTRAVENOUS PRN
Status: DISCONTINUED | OUTPATIENT
Start: 2020-12-16 | End: 2020-12-16

## 2020-12-16 RX ORDER — KETOROLAC TROMETHAMINE 15 MG/ML
15 INJECTION, SOLUTION INTRAMUSCULAR; INTRAVENOUS EVERY 6 HOURS
Status: DISCONTINUED | OUTPATIENT
Start: 2020-12-16 | End: 2020-12-17 | Stop reason: HOSPADM

## 2020-12-16 RX ORDER — AMOXICILLIN 250 MG
1 CAPSULE ORAL AT BEDTIME
Status: DISCONTINUED | OUTPATIENT
Start: 2020-12-16 | End: 2020-12-17 | Stop reason: HOSPADM

## 2020-12-16 RX ORDER — GLYCOPYRROLATE 0.2 MG/ML
INJECTION, SOLUTION INTRAMUSCULAR; INTRAVENOUS PRN
Status: DISCONTINUED | OUTPATIENT
Start: 2020-12-16 | End: 2020-12-16

## 2020-12-16 RX ORDER — NALOXONE HYDROCHLORIDE 0.4 MG/ML
0.4 INJECTION, SOLUTION INTRAMUSCULAR; INTRAVENOUS; SUBCUTANEOUS
Status: DISCONTINUED | OUTPATIENT
Start: 2020-12-16 | End: 2020-12-17 | Stop reason: HOSPADM

## 2020-12-16 RX ORDER — DEXAMETHASONE SODIUM PHOSPHATE 4 MG/ML
INJECTION, SOLUTION INTRA-ARTICULAR; INTRALESIONAL; INTRAMUSCULAR; INTRAVENOUS; SOFT TISSUE PRN
Status: DISCONTINUED | OUTPATIENT
Start: 2020-12-16 | End: 2020-12-16

## 2020-12-16 RX ORDER — OXYBUTYNIN CHLORIDE 5 MG/1
5 TABLET, EXTENDED RELEASE ORAL AT BEDTIME
Status: DISCONTINUED | OUTPATIENT
Start: 2020-12-16 | End: 2020-12-17 | Stop reason: HOSPADM

## 2020-12-16 RX ORDER — LABETALOL 20 MG/4 ML (5 MG/ML) INTRAVENOUS SYRINGE
10
Status: DISCONTINUED | OUTPATIENT
Start: 2020-12-16 | End: 2020-12-16 | Stop reason: HOSPADM

## 2020-12-16 RX ORDER — ACETAMINOPHEN 325 MG/1
975 TABLET ORAL ONCE
Status: DISCONTINUED | OUTPATIENT
Start: 2020-12-16 | End: 2020-12-16 | Stop reason: HOSPADM

## 2020-12-16 RX ORDER — LIDOCAINE 40 MG/G
CREAM TOPICAL
Status: DISCONTINUED | OUTPATIENT
Start: 2020-12-16 | End: 2020-12-16 | Stop reason: HOSPADM

## 2020-12-16 RX ORDER — PROPOFOL 10 MG/ML
INJECTION, EMULSION INTRAVENOUS PRN
Status: DISCONTINUED | OUTPATIENT
Start: 2020-12-16 | End: 2020-12-16

## 2020-12-16 RX ORDER — FENTANYL CITRATE 50 UG/ML
25-50 INJECTION, SOLUTION INTRAMUSCULAR; INTRAVENOUS
Status: DISCONTINUED | OUTPATIENT
Start: 2020-12-16 | End: 2020-12-16 | Stop reason: HOSPADM

## 2020-12-16 RX ORDER — ONDANSETRON 2 MG/ML
INJECTION INTRAMUSCULAR; INTRAVENOUS PRN
Status: DISCONTINUED | OUTPATIENT
Start: 2020-12-16 | End: 2020-12-16

## 2020-12-16 RX ORDER — LIDOCAINE HYDROCHLORIDE 10 MG/ML
INJECTION, SOLUTION INFILTRATION; PERINEURAL PRN
Status: DISCONTINUED | OUTPATIENT
Start: 2020-12-16 | End: 2020-12-16

## 2020-12-16 RX ORDER — ONDANSETRON 2 MG/ML
4 INJECTION INTRAMUSCULAR; INTRAVENOUS EVERY 6 HOURS PRN
Status: DISCONTINUED | OUTPATIENT
Start: 2020-12-16 | End: 2020-12-17 | Stop reason: HOSPADM

## 2020-12-16 RX ORDER — CEFAZOLIN SODIUM 2 G/100ML
2 INJECTION, SOLUTION INTRAVENOUS
Status: COMPLETED | OUTPATIENT
Start: 2020-12-16 | End: 2020-12-16

## 2020-12-16 RX ORDER — FENTANYL CITRATE 50 UG/ML
INJECTION, SOLUTION INTRAMUSCULAR; INTRAVENOUS PRN
Status: DISCONTINUED | OUTPATIENT
Start: 2020-12-16 | End: 2020-12-16

## 2020-12-16 RX ORDER — OXYCODONE HYDROCHLORIDE 5 MG/1
5-10 TABLET ORAL EVERY 4 HOURS PRN
Status: DISCONTINUED | OUTPATIENT
Start: 2020-12-16 | End: 2020-12-17 | Stop reason: HOSPADM

## 2020-12-16 RX ORDER — BUPIVACAINE HYDROCHLORIDE 2.5 MG/ML
INJECTION, SOLUTION INFILTRATION; PERINEURAL PRN
Status: DISCONTINUED | OUTPATIENT
Start: 2020-12-16 | End: 2020-12-16 | Stop reason: HOSPADM

## 2020-12-16 RX ORDER — CEFAZOLIN SODIUM 1 G/3ML
1 INJECTION, POWDER, FOR SOLUTION INTRAMUSCULAR; INTRAVENOUS SEE ADMIN INSTRUCTIONS
Status: DISCONTINUED | OUTPATIENT
Start: 2020-12-16 | End: 2020-12-16 | Stop reason: HOSPADM

## 2020-12-16 RX ORDER — HYDROMORPHONE HYDROCHLORIDE 1 MG/ML
.3-.5 INJECTION, SOLUTION INTRAMUSCULAR; INTRAVENOUS; SUBCUTANEOUS EVERY 10 MIN PRN
Status: DISCONTINUED | OUTPATIENT
Start: 2020-12-16 | End: 2020-12-16 | Stop reason: HOSPADM

## 2020-12-16 RX ORDER — ACETAMINOPHEN 325 MG/1
650 TABLET ORAL EVERY 4 HOURS
Status: DISCONTINUED | OUTPATIENT
Start: 2020-12-16 | End: 2020-12-17 | Stop reason: HOSPADM

## 2020-12-16 RX ORDER — CALCIUM CARBONATE 500 MG/1
500 TABLET, CHEWABLE ORAL 4 TIMES DAILY PRN
Status: DISCONTINUED | OUTPATIENT
Start: 2020-12-16 | End: 2020-12-17 | Stop reason: HOSPADM

## 2020-12-16 RX ORDER — NEOSTIGMINE METHYLSULFATE 1 MG/ML
VIAL (ML) INJECTION PRN
Status: DISCONTINUED | OUTPATIENT
Start: 2020-12-16 | End: 2020-12-16

## 2020-12-16 RX ORDER — SODIUM CHLORIDE 9 MG/ML
INJECTION, SOLUTION INTRAVENOUS CONTINUOUS
Status: DISCONTINUED | OUTPATIENT
Start: 2020-12-16 | End: 2020-12-17

## 2020-12-16 RX ORDER — PROPOFOL 10 MG/ML
INJECTION, EMULSION INTRAVENOUS CONTINUOUS PRN
Status: DISCONTINUED | OUTPATIENT
Start: 2020-12-16 | End: 2020-12-16

## 2020-12-16 RX ORDER — MEPERIDINE HYDROCHLORIDE 25 MG/ML
12.5 INJECTION INTRAMUSCULAR; INTRAVENOUS; SUBCUTANEOUS
Status: DISCONTINUED | OUTPATIENT
Start: 2020-12-16 | End: 2020-12-16 | Stop reason: HOSPADM

## 2020-12-16 RX ADMIN — LIDOCAINE HYDROCHLORIDE 30 MG: 10 INJECTION, SOLUTION INFILTRATION; PERINEURAL at 12:26

## 2020-12-16 RX ADMIN — SODIUM CHLORIDE: 9 INJECTION, SOLUTION INTRAVENOUS at 19:50

## 2020-12-16 RX ADMIN — CEFAZOLIN SODIUM 2 G: 2 INJECTION, SOLUTION INTRAVENOUS at 12:18

## 2020-12-16 RX ADMIN — ROCURONIUM BROMIDE 20 MG: 10 INJECTION INTRAVENOUS at 13:10

## 2020-12-16 RX ADMIN — DOCUSATE SODIUM AND SENNOSIDES 1 TABLET: 8.6; 5 TABLET ORAL at 21:29

## 2020-12-16 RX ADMIN — ROCURONIUM BROMIDE 10 MG: 10 INJECTION INTRAVENOUS at 15:15

## 2020-12-16 RX ADMIN — PHENYLEPHRINE HYDROCHLORIDE 100 MCG: 10 INJECTION INTRAVENOUS at 14:29

## 2020-12-16 RX ADMIN — ACETAMINOPHEN 650 MG: 325 TABLET, FILM COATED ORAL at 21:29

## 2020-12-16 RX ADMIN — PROPOFOL 50 MCG/KG/MIN: 10 INJECTION, EMULSION INTRAVENOUS at 12:30

## 2020-12-16 RX ADMIN — GLYCOPYRROLATE 0.3 MG: 0.2 INJECTION, SOLUTION INTRAMUSCULAR; INTRAVENOUS at 12:26

## 2020-12-16 RX ADMIN — SODIUM CHLORIDE, POTASSIUM CHLORIDE, SODIUM LACTATE AND CALCIUM CHLORIDE: 600; 310; 30; 20 INJECTION, SOLUTION INTRAVENOUS at 15:38

## 2020-12-16 RX ADMIN — HYDROMORPHONE HYDROCHLORIDE 0.5 MG: 1 INJECTION, SOLUTION INTRAMUSCULAR; INTRAVENOUS; SUBCUTANEOUS at 19:04

## 2020-12-16 RX ADMIN — KETOROLAC TROMETHAMINE 15 MG: 15 INJECTION, SOLUTION INTRAMUSCULAR; INTRAVENOUS at 21:29

## 2020-12-16 RX ADMIN — ROCURONIUM BROMIDE 50 MG: 10 INJECTION INTRAVENOUS at 12:26

## 2020-12-16 RX ADMIN — SODIUM CHLORIDE, POTASSIUM CHLORIDE, SODIUM LACTATE AND CALCIUM CHLORIDE: 600; 310; 30; 20 INJECTION, SOLUTION INTRAVENOUS at 12:18

## 2020-12-16 RX ADMIN — GLYCOPYRROLATE 0.4 MG: 0.2 INJECTION, SOLUTION INTRAMUSCULAR; INTRAVENOUS at 16:56

## 2020-12-16 RX ADMIN — FENTANYL CITRATE 50 MCG: 50 INJECTION, SOLUTION INTRAMUSCULAR; INTRAVENOUS at 17:38

## 2020-12-16 RX ADMIN — DEXAMETHASONE SODIUM PHOSPHATE 4 MG: 4 INJECTION, SOLUTION INTRA-ARTICULAR; INTRALESIONAL; INTRAMUSCULAR; INTRAVENOUS; SOFT TISSUE at 12:26

## 2020-12-16 RX ADMIN — ONDANSETRON HYDROCHLORIDE 4 MG: 2 INJECTION, SOLUTION INTRAVENOUS at 16:56

## 2020-12-16 RX ADMIN — FENTANYL CITRATE 100 MCG: 50 INJECTION, SOLUTION INTRAMUSCULAR; INTRAVENOUS at 12:26

## 2020-12-16 RX ADMIN — MIDAZOLAM 2 MG: 1 INJECTION INTRAMUSCULAR; INTRAVENOUS at 12:18

## 2020-12-16 RX ADMIN — PROPOFOL 180 MG: 10 INJECTION, EMULSION INTRAVENOUS at 12:26

## 2020-12-16 RX ADMIN — Medication 3 MG: at 16:56

## 2020-12-16 RX ADMIN — FAMOTIDINE 20 MG: 20 TABLET ORAL at 21:29

## 2020-12-16 RX ADMIN — HEPARIN SODIUM 5000 UNITS: 10000 INJECTION, SOLUTION INTRAVENOUS; SUBCUTANEOUS at 21:33

## 2020-12-16 RX ADMIN — SODIUM CHLORIDE, POTASSIUM CHLORIDE, SODIUM LACTATE AND CALCIUM CHLORIDE: 600; 310; 30; 20 INJECTION, SOLUTION INTRAVENOUS at 13:11

## 2020-12-16 RX ADMIN — FENTANYL CITRATE 50 MCG: 50 INJECTION, SOLUTION INTRAMUSCULAR; INTRAVENOUS at 18:05

## 2020-12-16 RX ADMIN — OXYBUTYNIN CHLORIDE 5 MG: 5 TABLET, EXTENDED RELEASE ORAL at 21:31

## 2020-12-16 RX ADMIN — POLYETHYLENE GLYCOL 3350 17 G: 17 POWDER, FOR SOLUTION ORAL at 21:29

## 2020-12-16 RX ADMIN — ROCURONIUM BROMIDE 15 MG: 10 INJECTION INTRAVENOUS at 13:50

## 2020-12-16 RX ADMIN — LABETALOL HYDROCHLORIDE 5 MG: 5 INJECTION INTRAVENOUS at 13:22

## 2020-12-16 RX ADMIN — ROCURONIUM BROMIDE 15 MG: 10 INJECTION INTRAVENOUS at 16:22

## 2020-12-16 RX ADMIN — CEFAZOLIN 1 G: 1 INJECTION, POWDER, FOR SOLUTION INTRAMUSCULAR; INTRAVENOUS at 16:53

## 2020-12-16 RX ADMIN — HYDROMORPHONE HYDROCHLORIDE 1 MG: 1 INJECTION, SOLUTION INTRAMUSCULAR; INTRAVENOUS; SUBCUTANEOUS at 12:26

## 2020-12-16 SDOH — HEALTH STABILITY: MENTAL HEALTH: CURRENT SMOKER: 0

## 2020-12-16 ASSESSMENT — MIFFLIN-ST. JEOR: SCORE: 1682.16

## 2020-12-16 NOTE — BRIEF OP NOTE
[facility]    Brief Operative Note    Pre-operative diagnosis: Prostate cancer (H) [C61]  Post-operative diagnosis Same as pre-operative diagnosis    Procedure: Procedure(s):  PROSTATECTOMY, ROBOT-ASSISTED, WITH PELVIC LYMPHADENECTOMY, POSSIBLE OPEN  Surgeon: Surgeon(s) and Role:     * Power Barrera MD - Primary  Anesthesia: General   Estimated blood loss: 300 ml  Drains: 19F Jero drain, 20F townsend  Specimens:   ID Type Source Tests Collected by Time Destination   A : Prostate, Seminal Vesicles, and Bilateral Pelvic Lymph Nodes Tissue Prostate SURGICAL PATHOLOGY EXAM Power Barrera MD 12/16/2020  4:15 PM    B : Right Lateral Prostate Margin Tissue Prostate SURGICAL PATHOLOGY EXAM Power Barrera MD 12/16/2020  4:20 PM      Findings:   Unremarkable procedure.  Complications: None.  Implants: * No implants in log *

## 2020-12-16 NOTE — ANESTHESIA CARE TRANSFER NOTE
Patient: Jeramie Sánchez    Procedure(s):  PROSTATECTOMY, ROBOT-ASSISTED, WITH PELVIC LYMPHADENECTOMY, POSSIBLE OPEN    Diagnosis: Prostate cancer (H) [C61]  Diagnosis Additional Information: No value filed.    Anesthesia Type:   General     Note:  Airway :Face Mask  Patient transferred to:PACU  Comments: Spont Resps. Follows commands. Extubated. Maintains Resps. Tx to pacu. Report to RNHandoff Report: Identifed the Patient, Identified the Reponsible Provider, Reviewed the pertinent medical history, Discussed the surgical course, Reviewed Intra-OP anesthesia mangement and issues during anesthesia, Set expectations for post-procedure period and Allowed opportunity for questions and acknowledgement of understanding      Vitals: (Last set prior to Anesthesia Care Transfer)    CRNA VITALS  12/16/2020 1639 - 12/16/2020 1713      12/16/2020             Pulse:  61    SpO2:  100 %    Resp Rate (observed):  (!) 4                Electronically Signed By: IRENE Marte CRNA  December 16, 2020  5:13 PM

## 2020-12-16 NOTE — ANESTHESIA PROCEDURE NOTES
Airway   Date/Time: 12/16/2020 12:29 PM   Patient location during procedure: OR    Staff -   Anesthesiologist:  Ryan Ordonez MD  CRNA: Chris Iqbal APRN CRNA  Performed By: anesthesiologist    Indications and Patient Condition  Indications for airway management: gaviota-procedural      Final Airway Details  Final airway type: endotracheal airway  Successful airway:Oral  Endotracheal Airway Details   Cuffed: yes  Successful intubation technique: video laryngoscopy  Grade View of Cords: 1  Measured from: gums/teeth  Secured at (cm): 25  Secured with: plastic tape  Bite block used: Soft    Post intubation assessment   Placement verified by: capnometry, equal breath sounds and chest rise   Number of attempts at approach: 1  Secured with:plastic tape  Ease of procedure: easy  Dentition: Unchanged and Intact

## 2020-12-16 NOTE — ANESTHESIA PREPROCEDURE EVALUATION
Anesthesia Pre-Procedure Evaluation    Patient: Jeramie Sánchez   MRN: [id number] : 1960          Preoperative Diagnosis: Prostate cancer (H) [C61]    Procedure(s):  PROSTATECTOMY, ROBOT-ASSISTED, WITH PELVIC LYMPHADENECTOMY, POSSIBLE OPEN    Past Medical History:   Diagnosis Date     Arthritis      Hypertension      Prostate cancer (H)      Sleep apnea     uses CPAP     Past Surgical History:   Procedure Laterality Date     ARTHROPLASTY HIP ANTERIOR Left 2014    Procedure: ARTHROPLASTY HIP ANTERIOR;  Surgeon: Drew Phelps MD;  Location: [facility] OR     ENT SURGERY      jaw repair for dental reasons     PROSTATE SURGERY       Anesthesia Evaluation     . Pt has had prior anesthetic. Type: General    No history of anesthetic complications          ROS/MED HX    ENT/Pulmonary:     (+)sleep apnea, uses CPAP , . .    Neurologic:  - neg neurologic ROS     Cardiovascular:     (+) hypertension----. : . . . :. .       METS/Exercise Tolerance:     Hematologic:  - neg hematologic  ROS       Musculoskeletal:   (+) arthritis,  -       GI/Hepatic:  - neg GI/hepatic ROS       Renal/Genitourinary:  - ROS Renal section negative       Endo:  - neg endo ROS       Psychiatric:  - neg psychiatric ROS       Infectious Disease:  - neg infectious disease ROS       Malignancy:         Other:    - neg other ROS                      Physical Exam  Normal systems: cardiovascular, pulmonary and dental    Airway   Mallampati: II  TM distance: >3 FB  Neck ROM: full    Dental     Cardiovascular       Pulmonary             Lab Results   Component Value Date    WBC 3.8 (L) 2020    HGB 12.5 (L) 2020    HCT 38.2 (L) 2020     2020     2020    POTASSIUM 4.1 2020    CHLORIDE 106 2020    CO2 29 2020    BUN 21 2020    CR 1.17 2020     (H) 2020    YAJAIRA 9.2 2020    ALBUMIN 4.0 2020    PROTTOTAL 7.5 2020    ALT 22 2020    AST 15  "11/30/2020    ALKPHOS 71 11/30/2020    BILITOTAL 0.4 11/30/2020    TSH 0.78 03/13/2020       Preop Vitals  BP Readings from Last 3 Encounters:   12/16/20 114/76   11/30/20 122/74   08/10/20 122/80    Pulse Readings from Last 3 Encounters:   12/16/20 65   11/30/20 72   07/07/20 72      Resp Readings from Last 3 Encounters:   12/16/20 14   11/30/20 13   03/13/20 12    SpO2 Readings from Last 3 Encounters:   12/16/20 97%   11/30/20 100%   07/07/20 97%      Temp Readings from Last 1 Encounters:   12/16/20 97.3  F (36.3  C) (Temporal)    Ht Readings from Last 1 Encounters:   12/16/20 1.778 m (5' 10\")      Wt Readings from Last 1 Encounters:   12/16/20 87.1 kg (192 lb)    Estimated body mass index is 27.55 kg/m  as calculated from the following:    Height as of this encounter: 1.778 m (5' 10\").    Weight as of this encounter: 87.1 kg (192 lb).       Anesthesia Plan      History & Physical Review      ASA Status:  2 .    NPO Status:  > 8 hours    Plan for General with Intravenous induction. Maintenance will be Balanced.    PONV prophylaxis:  Ondansetron (or other 5HT-3) and Dexamethasone or Solumedrol    The patient is not a current smoker      Postoperative Care  Postoperative pain management:  IV analgesics, Oral pain medications and Multi-modal analgesia.      Consents  Anesthetic plan, risks, benefits and alternatives discussed with:  Patient..                 Ryan Ordonez MD                    .  "

## 2020-12-17 VITALS
BODY MASS INDEX: 27.49 KG/M2 | DIASTOLIC BLOOD PRESSURE: 70 MMHG | TEMPERATURE: 98.2 F | RESPIRATION RATE: 18 BRPM | HEART RATE: 82 BPM | WEIGHT: 192 LBS | SYSTOLIC BLOOD PRESSURE: 162 MMHG | OXYGEN SATURATION: 95 % | HEIGHT: 70 IN

## 2020-12-17 LAB
ANION GAP SERPL CALCULATED.3IONS-SCNC: 5 MMOL/L (ref 3–14)
BUN SERPL-MCNC: 25 MG/DL (ref 7–30)
CALCIUM SERPL-MCNC: 8.7 MG/DL (ref 8.5–10.1)
CHLORIDE SERPL-SCNC: 105 MMOL/L (ref 94–109)
CO2 SERPL-SCNC: 28 MMOL/L (ref 20–32)
CREAT FLD-MCNC: 1.3 MG/DL
CREAT SERPL-MCNC: 1.2 MG/DL (ref 0.66–1.25)
ERYTHROCYTE [DISTWIDTH] IN BLOOD BY AUTOMATED COUNT: 13.2 % (ref 10–15)
GFR SERPL CREATININE-BSD FRML MDRD: 65 ML/MIN/{1.73_M2}
GLUCOSE SERPL-MCNC: 121 MG/DL (ref 70–99)
HCT VFR BLD AUTO: 35.7 % (ref 40–53)
HGB BLD-MCNC: 11.5 G/DL (ref 13.3–17.7)
MCH RBC QN AUTO: 32.4 PG (ref 26.5–33)
MCHC RBC AUTO-ENTMCNC: 32.2 G/DL (ref 31.5–36.5)
MCV RBC AUTO: 101 FL (ref 78–100)
PLATELET # BLD AUTO: 184 10E9/L (ref 150–450)
POTASSIUM SERPL-SCNC: 4.1 MMOL/L (ref 3.4–5.3)
RBC # BLD AUTO: 3.55 10E12/L (ref 4.4–5.9)
SODIUM SERPL-SCNC: 138 MMOL/L (ref 133–144)
SPECIMEN SOURCE FLD: NORMAL
WBC # BLD AUTO: 7.9 10E9/L (ref 4–11)

## 2020-12-17 PROCEDURE — 96372 THER/PROPH/DIAG INJ SC/IM: CPT | Performed by: STUDENT IN AN ORGANIZED HEALTH CARE EDUCATION/TRAINING PROGRAM

## 2020-12-17 PROCEDURE — 250N000013 HC RX MED GY IP 250 OP 250 PS 637: Performed by: STUDENT IN AN ORGANIZED HEALTH CARE EDUCATION/TRAINING PROGRAM

## 2020-12-17 PROCEDURE — 82570 ASSAY OF URINE CREATININE: CPT | Performed by: STUDENT IN AN ORGANIZED HEALTH CARE EDUCATION/TRAINING PROGRAM

## 2020-12-17 PROCEDURE — 36415 COLL VENOUS BLD VENIPUNCTURE: CPT | Performed by: STUDENT IN AN ORGANIZED HEALTH CARE EDUCATION/TRAINING PROGRAM

## 2020-12-17 PROCEDURE — 85027 COMPLETE CBC AUTOMATED: CPT | Performed by: STUDENT IN AN ORGANIZED HEALTH CARE EDUCATION/TRAINING PROGRAM

## 2020-12-17 PROCEDURE — 258N000003 HC RX IP 258 OP 636: Performed by: STUDENT IN AN ORGANIZED HEALTH CARE EDUCATION/TRAINING PROGRAM

## 2020-12-17 PROCEDURE — 250N000011 HC RX IP 250 OP 636: Performed by: STUDENT IN AN ORGANIZED HEALTH CARE EDUCATION/TRAINING PROGRAM

## 2020-12-17 PROCEDURE — 80048 BASIC METABOLIC PNL TOTAL CA: CPT | Performed by: STUDENT IN AN ORGANIZED HEALTH CARE EDUCATION/TRAINING PROGRAM

## 2020-12-17 RX ORDER — OXYBUTYNIN CHLORIDE 5 MG/1
5 TABLET, EXTENDED RELEASE ORAL DAILY
Qty: 14 TABLET | Refills: 0 | Status: SHIPPED | OUTPATIENT
Start: 2020-12-17 | End: 2021-03-08

## 2020-12-17 RX ORDER — CIPROFLOXACIN 500 MG/1
500 TABLET, FILM COATED ORAL ONCE
Qty: 1 TABLET | Refills: 0 | Status: SHIPPED | OUTPATIENT
Start: 2020-12-17 | End: 2020-12-17

## 2020-12-17 RX ORDER — SENNA AND DOCUSATE SODIUM 50; 8.6 MG/1; MG/1
1 TABLET, FILM COATED ORAL AT BEDTIME
Qty: 30 TABLET | Refills: 1 | Status: SHIPPED | OUTPATIENT
Start: 2020-12-17 | End: 2021-03-08

## 2020-12-17 RX ADMIN — KETOROLAC TROMETHAMINE 15 MG: 15 INJECTION, SOLUTION INTRAMUSCULAR; INTRAVENOUS at 02:25

## 2020-12-17 RX ADMIN — KETOROLAC TROMETHAMINE 15 MG: 15 INJECTION, SOLUTION INTRAMUSCULAR; INTRAVENOUS at 08:42

## 2020-12-17 RX ADMIN — FAMOTIDINE 20 MG: 20 TABLET ORAL at 08:42

## 2020-12-17 RX ADMIN — ACETAMINOPHEN 650 MG: 325 TABLET, FILM COATED ORAL at 10:17

## 2020-12-17 RX ADMIN — HEPARIN SODIUM 5000 UNITS: 10000 INJECTION, SOLUTION INTRAVENOUS; SUBCUTANEOUS at 06:11

## 2020-12-17 RX ADMIN — SODIUM CHLORIDE: 9 INJECTION, SOLUTION INTRAVENOUS at 04:54

## 2020-12-17 RX ADMIN — ACETAMINOPHEN 650 MG: 325 TABLET, FILM COATED ORAL at 00:53

## 2020-12-17 RX ADMIN — ACETAMINOPHEN 650 MG: 325 TABLET, FILM COATED ORAL at 15:13

## 2020-12-17 RX ADMIN — ACETAMINOPHEN 650 MG: 325 TABLET, FILM COATED ORAL at 04:54

## 2020-12-17 NOTE — PROGRESS NOTES
Discharge instructions given and questions answered. Teachback was provided with catheter cares, medication administration, and activity limitations. Patient was able to get dressed independently. Daughter arrived to drive patient home.

## 2020-12-17 NOTE — OR NURSING
Bilateral crepitus noted on flanks up to mid sternum, no noted resp distress.  MDA notified , chest xray ordered

## 2020-12-17 NOTE — OP NOTE
Procedure Date: 12/16/2020      SURGEON:  Power Barrera MD      ASSISTANTS:  Wendy Huang MD and Rachel Rivera.      POSTOPERATIVE DIAGNOSIS:  Prostate cancer.      POSTOPERATIVE DIAGNOSIS:  Prostate cancer.      PROCEDURE PERFORMED:  Robotic-assisted laparoscopic radical prostatectomy with bilateral pelvic lymph node dissection, bilateral nerve-sparing procedure.      ANESTHESIA:  General.      COMPLICATIONS:  None.      ESTIMATED BLOOD LOSS:  300 mL      INDICATIONS FOR PROCEDURE:  Jeramie Sánchez is a 60-year-old gentleman with a [place] 3+4 equals 7 prostate cancer and enlarged prostate who now presents for robotic prostatectomy.      DETAILS OF THE PROCEDURE:  The risks and benefits of the procedure were explained to the patient and informed consent was obtained.  The patient was brought to the operating room, placed supine on the table.  General endotracheal anesthetic.  His legs moved into the stirrups and he was secured to the table.  The abdomen was shaved and prepped and draped in standard sterile fashion.  After appropriate timeout, I placed a Prabhakar catheter sterilely on the field.  Attention at the umbilicus and inserted a Veress needle at the site.  I achieved 50 mmHg pressure in the abdomen and then I placed a 10 mm Visiport above the umbilicus under direct visualization.  I visualized the abdomen and no injury occurred.  Under direct visualization, I then placed 2 right-sided robotic arm ports and one on the left side as well as a 12 mm AirSeal port off to the left side of the robotic camera port.  The patient was then brought into 25-degree Trendelenburg position and I docked the robot.  The dissection began by retracting up the sigmoid colon and incised anteriorly into the peritoneal reflection to identify vas deferens and vessels bilaterally.  The 4 structures were cleared of their surrounding tissues and vas deferens was cut on each side.  I then released my retraction on the sigmoid colon and  incised the medial umbilical ligament on each side to develop the space of Retzius until I identified the pubic arch and endopelvic fascia.  I cleared the fat overlying the endopelvic fascia and took down the superficial dorsal vein of the prostate with bipolar electrocautery.  I then incised endopelvic fascia bilaterally to identify the lateral limits of the prostate.  I then placed a 2-0 V-Loc suture twice around the dorsal venous complex for hemostasis purposes.  The needle was cut and removed from the body.  I then pulled the Prabhakar catheter to identify the prostatovesical junction and incised entry into the bladder neck to dissect the anterior bladder neck free from the anterior base of the prostate.  Dissection was carried through until I reached my Prabhakar catheter.  The Prabhakar catheter was brought down and brought through this plane of dissection for retraction purposes.  I then created a new bladder neck and dissected the posterior bladder neck free from the posterior base of the prostate and this dissection was carried through until I reached my vas deferens and seminal vesicles.  The 4 structures were then brought through the plane of dissection for retraction purposes.      An excellent bilateral nerve-sparing procedure by incising the lateral prostatic fascia and peeling away the neurovascular bundle from the posterolateral aspect of the prostate on each side.  I then took down the pedicles of prostate with Weck clips.  I tented up the vas deferens and incised the endopelvic fascia and then developed the posterior plane of the prostate all the way to the apex of the prostate.  I then removed any remaining pedicle tissue and then completed my nerve-sparing procedure from below by dissecting the neurovascular bundle free from the posterolateral aspect of the prostate all the way to the apex of the prostate on each side.      I next looked anteriorly and incised through the dorsal venous complex with minimal  bleeding.  I incised the urethra and freed the prostate.  The prostate was then placed in an EndoCatch specimen bag through the assistant port.  I next performed a bilateral pelvic lymph node dissection by dissecting free the lymph node back between the external iliac vein and obturator nerve on each side.  This was taken down with a series of Weck clips.  I then observed my neurovascular bundle pedicles.  There was a small amount of tissue attached to the right-sided neurovascular bundle that appeared to be neurovascular tissue.  This was removed with the scissors and sent along with the right lateral prostate margin for permanent pathology.  I then performed the vesicourethral reanastomosis by running a double-armed 3-0 V-Loc suture beginning at 6 o'clock and ending at the 12 o'clock position on each side.  I placed a new Prabhakar catheter through the urethra and irrigated the bladder until output was clear.  I then removed the left-sided arm and placed a Pete-Godoy drain in site.  This was tacked in with 2-0 silk suture.  The robot was then dedocked and the patient was brought out of Trendelenburg position.  I removed the ports.  I increased the supraumbilical incision such that the EndoCatch specimen could be brought out through this incision intact.  I then closed fascia at this incision with interrupted #1 PDS figure-of-eight sutures.  Skin incisions were all closed with 4-0 Monocryl subcuticular stitches and covered with Dermabond and the procedure was concluded.      The patient tolerated the procedure without complications.  He went to the post-anesthetic care in good condition.  He will go to the hospital for overnight monitoring from there.         VASILE SCHAFER MD             D: 2020   T: 2020   MT: KETTY      Name:     MERCEDEZ IBRAHIM   MRN:      [id number]        Account:        [id number]   :      1960           Procedure Date: 2020      Document: [id number]

## 2020-12-17 NOTE — PLAN OF CARE
End of Shift Summary  For vital signs and complete assessments, please see documentation flowsheets.     Pertinent assessments: POD 1, prostatectomy. VSS.  Scheduled tylenol and Toradol for abdominal pain.  Denies nausea and SOB.  GORDON drain with bright red/bloody output, GORDON leaking, dressing changed.  Lap sites with liquid bandage. Abd crepitus.  Prabhakar in place.     Major Shift Events :  uneventful    Treatment Plan: diet advancement, symptom management.      Bedside Nurse: Staci Lanza RN

## 2020-12-17 NOTE — PLAN OF CARE
Extensive townsend teaching provided  IV removed tip intact  GORDON removed, patient tolerated well. Dressing CD&I  Denies pain, x back pain poss r/t surgery  Tolerating diet.

## 2020-12-17 NOTE — ANESTHESIA POSTPROCEDURE EVALUATION
Patient: Jeramie Sánchez    Procedure(s):  PROSTATECTOMY, ROBOT-ASSISTED, WITH PELVIC LYMPHADENECTOMY, POSSIBLE OPEN    Diagnosis:Prostate cancer (H) [C61]  Diagnosis Additional Information: No value filed.    Anesthesia Type:  General    Note:  Anesthesia Post Evaluation    Patient location during evaluation: PACU  Patient participation: Able to fully participate in evaluation  Level of consciousness: awake  Pain management: adequate  Airway patency: patent  Cardiovascular status: acceptable  Respiratory status: acceptable  Hydration status: acceptable  PONV: controlled     Anesthetic complications: None          Last vitals:  Vitals:    12/17/20 0037 12/17/20 0420 12/17/20 0734   BP: 122/71 131/57 132/57   Pulse: 92 90 89   Resp: 16 16 19   Temp: 98.3  F (36.8  C) 98.8  F (37.1  C) 98.3  F (36.8  C)   SpO2: 100% 99% 99%         Electronically Signed By: Franco Carrero DO  December 17, 2020  9:15 AM

## 2020-12-24 LAB — COPATH REPORT: NORMAL

## 2020-12-29 ENCOUNTER — ALLIED HEALTH/NURSE VISIT (OUTPATIENT)
Dept: UROLOGY | Facility: CLINIC | Age: 60
End: 2020-12-29
Payer: COMMERCIAL

## 2020-12-29 DIAGNOSIS — C61 PROSTATE CANCER (H): Primary | ICD-10-CM

## 2020-12-29 PROCEDURE — 99024 POSTOP FOLLOW-UP VISIT: CPT

## 2020-12-30 NOTE — PROGRESS NOTES
Chief Complaint   Patient presents with     Hx of Prostate Cancer     Patient here today for Catheter out        There were no vitals taken for this visit. There is no height or weight on file to calculate BMI.    Patient Active Problem List   Diagnosis     Hip pain     Essential hypertension     JASSON (obstructive sleep apnea)     Prostate cancer (H)       No Known Allergies    Current Outpatient Medications   Medication Sig Dispense Refill     lisinopril-hydrochlorothiazide (ZESTORETIC) 20-25 MG tablet Take 1 tablet by mouth daily 90 tablet 3     multivitamin, therapeutic with minerals (MULTI-VITAMIN) TABS Take 1 tablet by mouth daily       oxybutynin ER (DITROPAN-XL) 5 MG 24 hr tablet Take 1 tablet (5 mg) by mouth daily 14 tablet 0     SENNA-docusate sodium (SENNA S) 8.6-50 MG tablet Take 1 tablet by mouth At Bedtime 30 tablet 1       Social History     Tobacco Use     Smoking status: Never Smoker     Smokeless tobacco: Never Used   Substance Use Topics     Alcohol use: Yes     Comment: 3 on weekends     Drug use: No           Catheter removal documentation on 12/29/2020:    Jeramie Sánchez presents to the clinic for catheter removal.  Reason for removal: scheduled removal   Order has been verified. YES  Catheter successfully removed at 8:42 AM without immediate complication.  100  cc's of urine present in the catheter bag.  Urethral meatus is free of secretions and encrustation.  The patient is afebrile.  The patient tolerated the procedure and was instructed to monitor  for pain or discomfort, return or call for pain, fever, will have some leakage of urine, watch for signs of infection and call with any question.      Rohini Truong FAVIOLA  12/29/2020

## 2020-12-30 NOTE — NURSING NOTE
Chief Complaint   Patient presents with     Hx of Prostate Cancer     Patient here today for Catheter out        There were no vitals taken for this visit. There is no height or weight on file to calculate BMI.    Patient Active Problem List   Diagnosis     Hip pain     Essential hypertension     JASSON (obstructive sleep apnea)     Prostate cancer (H)       No Known Allergies    Current Outpatient Medications   Medication Sig Dispense Refill     lisinopril-hydrochlorothiazide (ZESTORETIC) 20-25 MG tablet Take 1 tablet by mouth daily 90 tablet 3     multivitamin, therapeutic with minerals (MULTI-VITAMIN) TABS Take 1 tablet by mouth daily       oxybutynin ER (DITROPAN-XL) 5 MG 24 hr tablet Take 1 tablet (5 mg) by mouth daily 14 tablet 0     SENNA-docusate sodium (SENNA S) 8.6-50 MG tablet Take 1 tablet by mouth At Bedtime 30 tablet 1       Social History     Tobacco Use     Smoking status: Never Smoker     Smokeless tobacco: Never Used   Substance Use Topics     Alcohol use: Yes     Comment: 3 on weekends     Drug use: No       Rohini Truong [facility]  12/29/2020

## 2021-01-04 DIAGNOSIS — C61 PROSTATE CANCER (H): Primary | ICD-10-CM

## 2021-01-05 ENCOUNTER — VIRTUAL VISIT (OUTPATIENT)
Dept: UROLOGY | Facility: CLINIC | Age: 61
End: 2021-01-05
Payer: COMMERCIAL

## 2021-01-05 VITALS — BODY MASS INDEX: 27.3 KG/M2 | WEIGHT: 195 LBS | HEIGHT: 71 IN

## 2021-01-05 DIAGNOSIS — C61 PROSTATE CANCER (H): Primary | ICD-10-CM

## 2021-01-05 PROCEDURE — 99024 POSTOP FOLLOW-UP VISIT: CPT | Performed by: UROLOGY

## 2021-01-05 ASSESSMENT — MIFFLIN-ST. JEOR: SCORE: 1703.7

## 2021-01-05 ASSESSMENT — PAIN SCALES - GENERAL: PAINLEVEL: NO PAIN (0)

## 2021-01-05 NOTE — PROGRESS NOTES
Jeramie Sánchez is a 60 year old male who is being evaluated via a billable video visit.      How would you like to obtain your AVS? Mail a copy  If the video visit is dropped, the invitation should be resent by: Text to cell phone: [phone]  Will anyone else be joining your video visit? No        I was unable to reach after multiple times by telephone to discuss his pathology report so he ended up setting up this virtual appointment today.  He reports feeling well.  He had his catheter removed a week ago and says his urinary incontinence is already down to a minimum.  He is only wearing 1 pad per day.  He has a strong urinary stream with no complaints.  He feels well with no pain.    His pathology showed a [place] 3+4 equal 7 prostate cancer involving 10% of the prostate.  The pathology reports that it could not be determined whether or not he had extraprostatic extension but there were positive margins reported posteriorly and at the left apex.  I was surprised to see this report as his posterior dissection and apical dissections were both quite unremarkable during his surgery.    We discussed the pathology and the surveillance plan.  I will see him back in April for his first postoperative PSA check.    Video Start Time: 11:09 AM    Video-Visit Details    Type of service:  Video Visit    Video End Time:11:17 AM    Originating Location (pt. Location): Home    Distant Location (provider location):  [facility] [place]     Platform used for Video Visit: Schedule Savvy

## 2021-01-05 NOTE — LETTER
1/5/2021       RE: Jeramie Sánchez  [address]  [place] [address]     Dear Colleague,    Thank you for referring your patient, Jeramie Sánchez, to the [facility] [place] at [facility]. Please see a copy of my visit note below.    Jeramie Sánchez is a 60 year old male who is being evaluated via a billable video visit.      How would you like to obtain your AVS? Mail a copy  If the video visit is dropped, the invitation should be resent by: Text to cell phone: [phone]  Will anyone else be joining your video visit? No      I was unable to reach after multiple times by telephone to discuss his pathology report so he ended up setting up this virtual appointment today.  He reports feeling well.  He had his catheter removed a week ago and says his urinary incontinence is already down to a minimum.  He is only wearing 1 pad per day.  He has a strong urinary stream with no complaints.  He feels well with no pain.    His pathology showed a [place] 3+4 equal 7 prostate cancer involving 10% of the prostate.  The pathology reports that it could not be determined whether or not he had extraprostatic extension but there were positive margins reported posteriorly and at the left apex.  I was surprised to see this report as his posterior dissection and apical dissections were both quite unremarkable during his surgery.    We discussed the pathology and the surveillance plan.  I will see him back in April for his first postoperative PSA check.    Video Start Time: 11:09 AM    Video-Visit Details    Type of service:  Video Visit    Video End Time:11:17 AM    Originating Location (pt. Location): Home    Distant Location (provider location):  [facility] [place]     Platform used for Video Visit: Ryan    Again, thank you for allowing me to participate in the care of your patient.      Sincerely,    Power Barrera MD

## 2021-02-26 ENCOUNTER — TELEPHONE (OUTPATIENT)
Dept: UROLOGY | Facility: CLINIC | Age: 61
End: 2021-02-26

## 2021-02-26 NOTE — TELEPHONE ENCOUNTER
"Patient called nurse line and reports some uncomfortableness in groin area and small protrusion. He is wondering what to do and if this is normal following his recent procedure. Will send message to MD. Patient was asked to rate the pain, but says \"It's umcomfortable as heck\" No other descriptions. Patient was kind of vague with answers. Informed patient that if discomfort/pain is not manageable he should go to ER.     Nicole Cam LPN    "

## 2021-03-03 NOTE — TELEPHONE ENCOUNTER
Jeramie calling regarding the below messages. He just wants to make sure he is doing the correct thing by going to a general surgeon & make sure there is nothing else Dr. Barrera can do for the symptoms that he is experiencing after his surgery on 12/16/20. Please give Jeramie a call back to discuss. Thanks!

## 2021-03-04 NOTE — TELEPHONE ENCOUNTER
Returned phone call and LM. I'm not sure what was talked about with MD last week. He is not in the office this week to discuss. I can follow-up with him next week if patient was under the impression that he could do something. Otherwise, he should see General Surgeon regarding Protrusion.     Nicole Cam LPN

## 2021-03-08 ENCOUNTER — OFFICE VISIT (OUTPATIENT)
Dept: SURGERY | Facility: CLINIC | Age: 61
End: 2021-03-08
Payer: COMMERCIAL

## 2021-03-08 VITALS
OXYGEN SATURATION: 97 % | DIASTOLIC BLOOD PRESSURE: 88 MMHG | RESPIRATION RATE: 16 BRPM | SYSTOLIC BLOOD PRESSURE: 124 MMHG | WEIGHT: 192 LBS | HEIGHT: 70 IN | BODY MASS INDEX: 27.49 KG/M2 | HEART RATE: 74 BPM

## 2021-03-08 DIAGNOSIS — K43.9 VENTRAL HERNIA WITHOUT OBSTRUCTION OR GANGRENE: ICD-10-CM

## 2021-03-08 DIAGNOSIS — K40.90 LEFT INGUINAL HERNIA: Primary | ICD-10-CM

## 2021-03-08 PROCEDURE — 99214 OFFICE O/P EST MOD 30 MIN: CPT | Performed by: SURGERY

## 2021-03-08 ASSESSMENT — MIFFLIN-ST. JEOR: SCORE: 1682.16

## 2021-03-08 NOTE — PROGRESS NOTES
HPI:  Jeramie is a 60 year old male who presents for evaluation of left groin pain and bulge.  Symptoms began after his robotic prostatectomy.  This is described as aching and pressure.  The pain occurs with prolonged standing.  Associated symptoms include none. The patient has noticed a bulge. The patient has not had a previous herniorrhaphy in this location. Employment does require heavy lifting.  Additionally, he has central abdominal hernia that has been present for 7 or 8 years but is only occasionally symptomatic.    No anticoagulants    Constipation: No  Colonoscopy: Yes , 2018  Dysuria: recent DaVinci prostatectomy with node dissection  Cough: No  Diabetes: No    Past Medical History:   has a past medical history of Arthritis, Hypertension, Prostate cancer (H), and Sleep apnea.    Past Surgical History:  Past Surgical History:   Procedure Laterality Date     ARTHROPLASTY HIP ANTERIOR Left 11/25/2014    Procedure: ARTHROPLASTY HIP ANTERIOR;  Surgeon: Drew Phelps MD;  Location: [facility] OR     DAVINCI PROSTATECTOMY, LYMPHADENECTOMY Bilateral 12/16/2020    Procedure: PROSTATECTOMY, ROBOT-ASSISTED, WITH PELVIC LYMPHADENECTOMY, POSSIBLE OPEN;  Surgeon: Power Barrera MD;  Location: [facility] OR     ENT SURGERY      jaw repair for dental reasons     PROSTATE SURGERY        Additional abdominal operations: none    Social History:  Social History     Socioeconomic History     Marital status:      Spouse name: Not on file     Number of children: Not on file     Years of education: Not on file     Highest education level: Not on file   Occupational History     Not on file   Social Needs     Financial resource strain: Not on file     Food insecurity     Worry: Not on file     Inability: Not on file     Transportation needs     Medical: Not on file     Non-medical: Not on file   Tobacco Use     Smoking status: Never Smoker     Smokeless tobacco: Never Used   Substance and Sexual Activity     Alcohol use: Yes      Comment: 3 on weekends     Drug use: No     Sexual activity: Yes   Lifestyle     Physical activity     Days per week: Not on file     Minutes per session: Not on file     Stress: Not on file   Relationships     Social connections     Talks on phone: Not on file     Gets together: Not on file     Attends Druze service: Not on file     Active member of club or organization: Not on file     Attends meetings of clubs or organizations: Not on file     Relationship status: Not on file     Intimate partner violence     Fear of current or ex partner: Not on file     Emotionally abused: Not on file     Physically abused: Not on file     Forced sexual activity: Not on file   Other Topics Concern     Parent/sibling w/ CABG, MI or angioplasty before 65F 55M? Not Asked   Social History Narrative     Not on file        Family History:  Family History   Problem Relation Age of Onset     Other Cancer Mother      Heart Surgery Father      Diabetes No family hx of      Colon Cancer No family hx of        ROS:  The 10 point review of systems is negative other than noted in the HPI and above.    PE:    General- Well-developed, well-nourished, patient able to get up on table without difficulty.  HEENT- Normocephalic and atraumatic. Pupils equal and round.  Mucous membranes moist.  Sclera are nonicteric.  Neck- No lymphadenopathy or masses   Respirations- are regular and non labored  Abdomen is abdomen is soft without significant tenderness, masses, organomegaly or guarding  Hernia- Left inguinal hernia is present with valsalva              Right inguinal hernia is not present with valsalva              The hernia is reducible              Testicles are normal              Varix- left present  There is a central abdominal hernia involving the umbilicus and extending beyond it as well.  This is also reducible with mild tenderness.    Assesment: Ventral hernia, reducible left inguinal hernia    Plan:    We have discussed observation,  reduction techniques and importance, incarceration and strangulation signs, symptoms and importance as well as need to seek emergency treatment.    We have discussed surgery in detail, including risk, benefits, complications, mesh, infection, nerve and cord damage and their sequelae including chronic pain and testicular loss, lifting and activity limits after surgery. He has been given literature to review. We will schedule surgery at patient's convenience.    Time spent with the patient with greater that 50% of the time in discussion was 30 minutes.     Souarv Simon MD    Please route or send letter to:  Primary Care Provider (PCP) and Include Progress Note

## 2021-03-08 NOTE — LETTER
March 8, 2021          Maninder Espinoza MD  [address] [place] [address]      RE:   Jeramie Sánchez 1960      Dear Colleague,    Thank you for referring your patient, Jeramie Sánchez, to [facility] at [place] location. Please see a copy of my visit note below.    HPI:  Jeramie is a 60 year old male who presents for evaluation of left groin pain and bulge. Symptoms began after his robotic prostatectomy. This is described as aching and pressure. The pain occurs with prolonged standing. Associated symptoms include none. The patient has noticed a bulge. The patient has not had a previous herniorrhaphy in this location. Employment does require heavy lifting. Additionally, he has central abdominal hernia that has been present for 7 or 8 years but is only occasionally symptomatic.     Assesment: Ventral hernia, reducible left inguinal hernia     Plan:  We have discussed observation, reduction techniques and importance, incarceration and strangulation signs, symptoms and importance as well as need to seek emergency treatment.   We have discussed surgery in detail, including risk, benefits, complications, mesh, infection, nerve and cord damage and their sequelae including chronic pain and testicular loss, lifting and activity limits after surgery. He has been given literature to review. We will schedule surgery at patient's convenience.    Again, thank you for allowing me to participate in the care of your patient.      Sincerely,      Sourav Simon MD

## 2021-04-05 ENCOUNTER — TELEPHONE (OUTPATIENT)
Dept: SURGERY | Facility: CLINIC | Age: 61
End: 2021-04-05

## 2021-04-05 ENCOUNTER — PREP FOR PROCEDURE (OUTPATIENT)
Dept: SURGERY | Facility: CLINIC | Age: 61
End: 2021-04-05

## 2021-04-05 DIAGNOSIS — K43.9 VENTRAL HERNIA: ICD-10-CM

## 2021-04-05 DIAGNOSIS — C61 PROSTATE CANCER (H): ICD-10-CM

## 2021-04-05 DIAGNOSIS — K40.90 LEFT INGUINAL HERNIA: Primary | ICD-10-CM

## 2021-04-05 PROCEDURE — 84153 ASSAY OF PSA TOTAL: CPT | Performed by: UROLOGY

## 2021-04-05 PROCEDURE — 36415 COLL VENOUS BLD VENIPUNCTURE: CPT | Performed by: UROLOGY

## 2021-04-05 NOTE — TELEPHONE ENCOUNTER
Type of surgery: OPEN LEFT INGUINAL HERNIA REPAIR WITH MESH, OPEN VENTRAL HERNIA REPAIR WITH MESH   Location of surgery: [facility]  Date and time of surgery: 4-29-21, 7:30 AM   Surgeon: DR. CHAPA   Pre-Op Appt Date: PATIENT TO SCHEDULE   Post-Op Appt Date: PATIENT TO SCHEDULE    Packet sent out: Yes  Pre-cert/Authorization completed:  Not Applicable  Date: 4-5-21        OPEN LEFT INGUINAL HERNIA REPAIR WITH MESH, OPEN VENTRAL HERNIA REPAIR WITH MESH  GENERAL   PT INST TO HAVE H&P WITH DR. QUINTANILLA  2 HRS REQ  PA ASSIST HUSSEIN   ALW

## 2021-04-05 NOTE — LETTER
[facility]    [address], [address]  [place], [place] [address]  Phone [phone]  Fax [phone] [address], [address]  [place] [address]  Phone [phone]  Fax [phone]    [email or web address]   April 5, 2021    Jeramie Sánchez  [address] [address]    We realize with scheduling surgery, one of your first questions is, how much will this cost?  Below we have provided you with the information you will need to receive an estimate for your surgery.    You are scheduled for the following procedure:  Open left inguinal hernia repair with mesh, Open ventral hernia repair with mesh      Surgeon:  Dr. Simon     Physician Assistant:  Yes      Please make sure to have your insurance card available at the time of calling.    Surgeon & Physician Assistant charges and facility charges for [facility], [facility] or [facility]:    Consumer Price Line at [phone]   -  It is important to note that there may be a Physician Assistant assisting with your surgery.  Please be sure to mention this when calling for the estimate.      If you prefer, you can also request a price estimate online by completing the secure form at:  [email or web address]    Facility Charges at [facility], [facility] or [facility]:  [facility] at [phone]  [facility] at [phone]  [facility] at [phone] or [phone]    Anesthesiologist Charges:  [facility] at [phone]    [facility] - Nurse Anesthetist Charges:  [facility] at [phone]

## 2021-04-05 NOTE — LETTER
[facility]    [address] [facility], [address]  [place], [place] [address]  Phone [phone]  Fax [phone] [address], [address]  [facility] [place] [address]  Phone [phone]  Fax [phone]    [email or web address]         Jeramie Sánchez    You have been scheduled for a COVID-19 testing appointment at [facility].    4-26-21 at 1:00 PM     The clinic is located at:  [address] [place] [id number]    Please wear a mask or face covering to the testing site.      Following your testing, you will be required to self-isolate until your surgery.  If you need a note for your employer due to this, please let us know.

## 2021-04-06 ENCOUNTER — TELEPHONE (OUTPATIENT)
Dept: UROLOGY | Facility: CLINIC | Age: 61
End: 2021-04-06

## 2021-04-06 LAB — PSA SERPL-MCNC: 0.02 UG/L (ref 0–4)

## 2021-04-06 NOTE — TELEPHONE ENCOUNTER
----- Message from Power Barrera MD sent at 4/6/2021  8:06 AM CDT -----  Needs video visit sometime to discuss surgery recovery  No rush

## 2021-04-15 ENCOUNTER — TELEPHONE (OUTPATIENT)
Dept: UROLOGY | Facility: CLINIC | Age: 61
End: 2021-04-15

## 2021-04-15 RX ORDER — CEFAZOLIN SODIUM 2 G/100ML
2 INJECTION, SOLUTION INTRAVENOUS SEE ADMIN INSTRUCTIONS
Status: CANCELLED | OUTPATIENT
Start: 2021-04-15

## 2021-04-15 RX ORDER — CEFAZOLIN SODIUM 2 G/100ML
2 INJECTION, SOLUTION INTRAVENOUS
Status: CANCELLED | OUTPATIENT
Start: 2021-04-15

## 2021-04-15 NOTE — TELEPHONE ENCOUNTER
041   Mailing letter   [facility]    ----- Message from Power Barrera MD sent at 4/6/2021  8:06 AM CDT -----  Needs video visit sometime to discuss surgery recovery  No rush

## 2021-04-19 ENCOUNTER — OFFICE VISIT (OUTPATIENT)
Dept: INTERNAL MEDICINE | Facility: CLINIC | Age: 61
End: 2021-04-19
Payer: COMMERCIAL

## 2021-04-19 VITALS
HEART RATE: 90 BPM | WEIGHT: 199 LBS | BODY MASS INDEX: 28.49 KG/M2 | TEMPERATURE: 98.5 F | DIASTOLIC BLOOD PRESSURE: 79 MMHG | SYSTOLIC BLOOD PRESSURE: 130 MMHG | OXYGEN SATURATION: 98 % | HEIGHT: 70 IN

## 2021-04-19 DIAGNOSIS — Z01.818 PREOP GENERAL PHYSICAL EXAM: Primary | ICD-10-CM

## 2021-04-19 DIAGNOSIS — K42.9 UMBILICAL HERNIA WITHOUT OBSTRUCTION AND WITHOUT GANGRENE: ICD-10-CM

## 2021-04-19 DIAGNOSIS — I10 BENIGN ESSENTIAL HYPERTENSION: ICD-10-CM

## 2021-04-19 DIAGNOSIS — K40.30 UNILATERAL INGUINAL HERNIA WITH OBSTRUCTION AND WITHOUT GANGRENE, RECURRENCE NOT SPECIFIED: ICD-10-CM

## 2021-04-19 DIAGNOSIS — Z11.59 ENCOUNTER FOR SCREENING FOR OTHER VIRAL DISEASES: ICD-10-CM

## 2021-04-19 LAB
ANION GAP SERPL CALCULATED.3IONS-SCNC: 4 MMOL/L (ref 3–14)
BUN SERPL-MCNC: 21 MG/DL (ref 7–30)
CALCIUM SERPL-MCNC: 9.4 MG/DL (ref 8.5–10.1)
CHLORIDE SERPL-SCNC: 103 MMOL/L (ref 94–109)
CO2 SERPL-SCNC: 32 MMOL/L (ref 20–32)
CREAT SERPL-MCNC: 1.21 MG/DL (ref 0.66–1.25)
GFR SERPL CREATININE-BSD FRML MDRD: 64 ML/MIN/{1.73_M2}
GLUCOSE SERPL-MCNC: 100 MG/DL (ref 70–99)
HGB BLD-MCNC: 13.2 G/DL (ref 13.3–17.7)
POTASSIUM SERPL-SCNC: 3.6 MMOL/L (ref 3.4–5.3)
SODIUM SERPL-SCNC: 139 MMOL/L (ref 133–144)

## 2021-04-19 PROCEDURE — 80048 BASIC METABOLIC PNL TOTAL CA: CPT | Performed by: INTERNAL MEDICINE

## 2021-04-19 PROCEDURE — 93000 ELECTROCARDIOGRAM COMPLETE: CPT | Performed by: INTERNAL MEDICINE

## 2021-04-19 PROCEDURE — 85018 HEMOGLOBIN: CPT | Performed by: INTERNAL MEDICINE

## 2021-04-19 PROCEDURE — 99214 OFFICE O/P EST MOD 30 MIN: CPT | Performed by: INTERNAL MEDICINE

## 2021-04-19 PROCEDURE — 36415 COLL VENOUS BLD VENIPUNCTURE: CPT | Performed by: INTERNAL MEDICINE

## 2021-04-19 RX ORDER — LISINOPRIL AND HYDROCHLOROTHIAZIDE 20; 25 MG/1; MG/1
1 TABLET ORAL DAILY
Qty: 90 TABLET | Refills: 3 | Status: CANCELLED | OUTPATIENT
Start: 2021-04-19

## 2021-04-19 ASSESSMENT — MIFFLIN-ST. JEOR: SCORE: 1713.91

## 2021-04-19 NOTE — H&P (VIEW-ONLY)
[place] [address]  [facility] [address]  Phone: [phone]  Primary Provider: Epifanio Espinoza  Pre-op Performing Provider: EPIFANIO ESPINOZA      PREOPERATIVE EVALUATION:  Today's date: 4/19/2021    Jeramie Sánchez is a 60 year old male who presents for a preoperative evaluation.    Surgical Information:  Surgery/Procedure: Lt ventral/Inguinal hernia repair with mesh  Surgery Location: [facility]  Surgeon: Dr. Simon  Surgery Date: 04/29/21  Time of Surgery: 7:30  Where patient plans to recover: At home with family  Fax number for surgical facility: Note does not need to be faxed, will be available electronically in Epic.    Type of Anesthesia Anticipated: General    Assessment & Plan     The proposed surgical procedure is considered INTERMEDIATE risk.    Problem List Items Addressed This Visit     None      Visit Diagnoses     Preop general physical exam    -  Primary    Relevant Orders    EKG 12-lead complete w/read - Clinics (Completed)    Hemoglobin    Basic metabolic panel    Benign essential hypertension        Relevant Orders    Hemoglobin    Basic metabolic panel    Unilateral inguinal hernia with obstruction and without gangrene, recurrence not specified        Umbilical hernia without obstruction and without gangrene                   Risks and Recommendations:  The patient has the following additional risks and recommendations for perioperative complications:   - No identified additional risk factors other than previously addressed        RECOMMENDATION:  APPROVAL GIVEN to proceed with proposed procedure, without further diagnostic evaluation.                      Subjective     HPI related to upcoming procedure: scheduled for left inguinal hernia and umbilical hernia repair.   No acute complaints, no medication change or new medical conditions.  Has h/o HTN. on medical treatment. BP has been controlled. No side effects from medications. No CP, HA,  dizziness. good compliance with medications and low salt diet.  Has had surgery for prostate cancer 4 months ago. Recovered post surgery.     Preop Questions 4/19/2021   1. Have you ever had a heart attack or stroke? No   2. Have you ever had surgery on your heart or blood vessels, such as a stent placement, a coronary artery bypass, or surgery on an artery in your head, neck, heart, or legs? No   3. Do you have chest pain with activity? No   4. Do you have a history of  heart failure? No   5. Do you currently have a cold, bronchitis or symptoms of other infection? No   6. Do you have a cough, shortness of breath, or wheezing? No   7. Do you or anyone in your family have previous history of blood clots? No   8. Do you or does anyone in your family have a serious bleeding problem such as prolonged bleeding following surgeries or cuts? No   9. Have you ever had problems with anemia or been told to take iron pills? No   10. Have you had any abnormal blood loss such as black, tarry or bloody stools? No   11. Have you ever had a blood transfusion? No   12. Are you willing to have a blood transfusion if it is medically needed before, during, or after your surgery? Yes   13. Have you or any of your relatives ever had problems with anesthesia? No   14. Do you have sleep apnea, excessive snoring or daytime drowsiness? YES -    14a. Do you have a CPAP machine? Yes   15. Do you have any artifical heart valves or other implanted medical devices like a pacemaker, defibrillator, or continuous glucose monitor? No   16. Do you have artificial joints? YES -    17. Are you allergic to latex? No       Health Care Directive:  Patient does not have a Health Care Directive or Living Will: Patient states has Advance Directive and will bring in a copy to clinic.    Preoperative Review of :   reviewed - no record of controlled substances prescribed.      Status of Chronic Conditions:  See problem list for active medical problems.   Problems all longstanding and stable, except as noted/documented.  See ROS for pertinent symptoms related to these conditions.      Review of Systems  CONSTITUTIONAL: NEGATIVE for fever, chills, change in weight  INTEGUMENTARY/SKIN: NEGATIVE for worrisome rashes, moles or lesions  EYES: NEGATIVE for vision changes or irritation  ENT/MOUTH: NEGATIVE for ear, mouth and throat problems  RESP: NEGATIVE for significant cough or SOB  BREAST: NEGATIVE for masses, tenderness or discharge  CV: NEGATIVE for chest pain, palpitations or peripheral edema  GI: NEGATIVE for nausea, abdominal pain, heartburn, or change in bowel habits  : NEGATIVE for frequency, dysuria, or hematuria  MUSCULOSKELETAL: NEGATIVE for significant arthralgias or myalgia  NEURO: NEGATIVE for weakness, dizziness or paresthesias  ENDOCRINE: NEGATIVE for temperature intolerance, skin/hair changes  HEME: NEGATIVE for bleeding problems  PSYCHIATRIC: NEGATIVE for changes in mood or affect    Patient Active Problem List    Diagnosis Date Noted     Left inguinal hernia 04/05/2021     Priority: Medium     Added automatically from request for surgery [id number]       Ventral hernia 04/05/2021     Priority: Medium     Added automatically from request for surgery [id number]       Prostate cancer (H) 08/13/2020     Priority: Medium     Added automatically from request for surgery [id number]       JASSON (obstructive sleep apnea) 09/18/2018     Priority: Medium     HST, 9/17/2018, AHI: 57        Essential hypertension 01/05/2016     Priority: Medium     Hip pain 11/25/2014     Priority: Medium      Past Medical History:   Diagnosis Date     Arthritis      Hypertension      Prostate cancer (H)      Sleep apnea     uses CPAP     Past Surgical History:   Procedure Laterality Date     ARTHROPLASTY HIP ANTERIOR Left 11/25/2014    Procedure: ARTHROPLASTY HIP ANTERIOR;  Surgeon: Drew Phelps MD;  Location: [facility] OR     DAVINCI PROSTATECTOMY, LYMPHADENECTOMY Bilateral 12/16/2020     "Procedure: PROSTATECTOMY, ROBOT-ASSISTED, WITH PELVIC LYMPHADENECTOMY, POSSIBLE OPEN;  Surgeon: Power Barrera MD;  Location: [facility] OR     ENT SURGERY      jaw repair for dental reasons     PROSTATE SURGERY       Current Outpatient Medications   Medication Sig Dispense Refill     lisinopril-hydrochlorothiazide (ZESTORETIC) 20-25 MG tablet Take 1 tablet by mouth daily 90 tablet 3     multivitamin, therapeutic with minerals (MULTI-VITAMIN) TABS Take 1 tablet by mouth daily         No Known Allergies     Social History     Tobacco Use     Smoking status: Never Smoker     Smokeless tobacco: Never Used   Substance Use Topics     Alcohol use: Yes     Comment: 3 on weekends     Family History   Problem Relation Age of Onset     Other Cancer Mother      Heart Surgery Father      Diabetes No family hx of      Colon Cancer No family hx of      History   Drug Use No         Objective     /79 (BP Location: Left arm, Patient Position: Sitting, Cuff Size: Adult Large)   Pulse 90   Temp 98.5  F (36.9  C) (Oral)   Ht 1.778 m (5' 10\")   Wt 90.3 kg (199 lb)   SpO2 98%   BMI 28.55 kg/m      Physical Exam    GENERAL APPEARANCE: healthy, alert and no distress     EYES: EOMI,  PERRL     HENT: ear canals and TM's normal and nose and mouth without ulcers or lesions     NECK: no adenopathy, no asymmetry, masses, or scars and thyroid normal to palpation     RESP: lungs clear to auscultation - no rales, rhonchi or wheezes     CV: regular rates and rhythm, normal S1 S2, no S3 or S4 and no murmur, click or rub     ABDOMEN:  soft, nontender, no HSM or masses and bowel sounds normal  Small reducible umbilical hernia , left inguinal hernia, reduced, no pain.      MS: extremities normal- no gross deformities noted, no evidence of inflammation in joints, FROM in all extremities.     SKIN: no suspicious lesions or rashes     NEURO: Normal strength and tone, sensory exam grossly normal, mentation intact and speech normal     PSYCH: " mentation appears normal. and affect normal/bright     LYMPHATICS: No cervical adenopathy    Recent Labs   Lab Test 12/17/20  0559 12/16/20  1728   HGB 11.5* 12.2*    182    139   POTASSIUM 4.1 4.3   CR 1.20 1.16        Diagnostics:  Labs pending at this time.  Results will be reviewed when available.   EKG: appears normal, NSR, normal axis, normal intervals, no acute ST/T changes c/w ischemia, no LVH by voltage criteria, unchanged from previous tracings    Revised Cardiac Risk Index (RCRI):  The patient has the following serious cardiovascular risks for perioperative complications:   - No serious cardiac risks = 0 points     RCRI Interpretation: 0 points: Class I (very low risk - 0.4% complication rate)           Signed Electronically by: Maninder Espinoza MD  Copy of this evaluation report is provided to requesting physician.

## 2021-04-19 NOTE — LETTER
April 20, 2021      Jeramie Sánchez  [address] [facility] [address]        Dear ,    We are writing to inform you of your test results.    Normal result reviewed.   Borderline low Hgb, anemia, has improved. Recheck in 3 months.     Resulted Orders   Hemoglobin   Result Value Ref Range    Hemoglobin 13.2 (L) 13.3 - 17.7 g/dL   Basic metabolic panel   Result Value Ref Range    Sodium 139 133 - 144 mmol/L    Potassium 3.6 3.4 - 5.3 mmol/L    Chloride 103 94 - 109 mmol/L    Carbon Dioxide 32 20 - 32 mmol/L    Anion Gap 4 3 - 14 mmol/L    Glucose 100 (H) 70 - 99 mg/dL      Comment:      Non Fasting    Urea Nitrogen 21 7 - 30 mg/dL    Creatinine 1.21 0.66 - 1.25 mg/dL    GFR Estimate 64 >60 mL/min/[1.73_m2]      Comment:      Non  GFR Calc  Starting 12/18/2018, serum creatinine based estimated GFR (eGFR) will be   calculated using the Chronic Kidney Disease Epidemiology Collaboration   (CKD-EPI) equation.      GFR Estimate If Black 74 >60 mL/min/[1.73_m2]      Comment:       GFR Calc  Starting 12/18/2018, serum creatinine based estimated GFR (eGFR) will be   calculated using the Chronic Kidney Disease Epidemiology Collaboration   (CKD-EPI) equation.      Calcium 9.4 8.5 - 10.1 mg/dL       If you have any questions or concerns, please call the clinic at the number listed above.       Sincerely,      Maninder Espinoza MD

## 2021-04-19 NOTE — PROGRESS NOTES
[place] [address]  [facility] [address]  Phone: [phone]  Primary Provider: Epifanio Espinoza  Pre-op Performing Provider: EPIFANIO ESPINOZA      PREOPERATIVE EVALUATION:  Today's date: 4/19/2021    Jeramie Sánchez is a 60 year old male who presents for a preoperative evaluation.    Surgical Information:  Surgery/Procedure: Lt ventral/Inguinal hernia repair with mesh  Surgery Location: [facility]  Surgeon: Dr. Smion  Surgery Date: 04/29/21  Time of Surgery: 7:30  Where patient plans to recover: At home with family  Fax number for surgical facility: Note does not need to be faxed, will be available electronically in Epic.    Type of Anesthesia Anticipated: General    Assessment & Plan     The proposed surgical procedure is considered INTERMEDIATE risk.    Problem List Items Addressed This Visit     None      Visit Diagnoses     Preop general physical exam    -  Primary    Relevant Orders    EKG 12-lead complete w/read - Clinics (Completed)    Hemoglobin    Basic metabolic panel    Benign essential hypertension        Relevant Orders    Hemoglobin    Basic metabolic panel    Unilateral inguinal hernia with obstruction and without gangrene, recurrence not specified        Umbilical hernia without obstruction and without gangrene                   Risks and Recommendations:  The patient has the following additional risks and recommendations for perioperative complications:   - No identified additional risk factors other than previously addressed        RECOMMENDATION:  APPROVAL GIVEN to proceed with proposed procedure, without further diagnostic evaluation.                      Subjective     HPI related to upcoming procedure: scheduled for left inguinal hernia and umbilical hernia repair.   No acute complaints, no medication change or new medical conditions.  Has h/o HTN. on medical treatment. BP has been controlled. No side effects from medications. No CP, HA,  dizziness. good compliance with medications and low salt diet.  Has had surgery for prostate cancer 4 months ago. Recovered post surgery.     Preop Questions 4/19/2021   1. Have you ever had a heart attack or stroke? No   2. Have you ever had surgery on your heart or blood vessels, such as a stent placement, a coronary artery bypass, or surgery on an artery in your head, neck, heart, or legs? No   3. Do you have chest pain with activity? No   4. Do you have a history of  heart failure? No   5. Do you currently have a cold, bronchitis or symptoms of other infection? No   6. Do you have a cough, shortness of breath, or wheezing? No   7. Do you or anyone in your family have previous history of blood clots? No   8. Do you or does anyone in your family have a serious bleeding problem such as prolonged bleeding following surgeries or cuts? No   9. Have you ever had problems with anemia or been told to take iron pills? No   10. Have you had any abnormal blood loss such as black, tarry or bloody stools? No   11. Have you ever had a blood transfusion? No   12. Are you willing to have a blood transfusion if it is medically needed before, during, or after your surgery? Yes   13. Have you or any of your relatives ever had problems with anesthesia? No   14. Do you have sleep apnea, excessive snoring or daytime drowsiness? YES -    14a. Do you have a CPAP machine? Yes   15. Do you have any artifical heart valves or other implanted medical devices like a pacemaker, defibrillator, or continuous glucose monitor? No   16. Do you have artificial joints? YES -    17. Are you allergic to latex? No       Health Care Directive:  Patient does not have a Health Care Directive or Living Will: Patient states has Advance Directive and will bring in a copy to clinic.    Preoperative Review of :   reviewed - no record of controlled substances prescribed.      Status of Chronic Conditions:  See problem list for active medical problems.   Problems all longstanding and stable, except as noted/documented.  See ROS for pertinent symptoms related to these conditions.      Review of Systems  CONSTITUTIONAL: NEGATIVE for fever, chills, change in weight  INTEGUMENTARY/SKIN: NEGATIVE for worrisome rashes, moles or lesions  EYES: NEGATIVE for vision changes or irritation  ENT/MOUTH: NEGATIVE for ear, mouth and throat problems  RESP: NEGATIVE for significant cough or SOB  BREAST: NEGATIVE for masses, tenderness or discharge  CV: NEGATIVE for chest pain, palpitations or peripheral edema  GI: NEGATIVE for nausea, abdominal pain, heartburn, or change in bowel habits  : NEGATIVE for frequency, dysuria, or hematuria  MUSCULOSKELETAL: NEGATIVE for significant arthralgias or myalgia  NEURO: NEGATIVE for weakness, dizziness or paresthesias  ENDOCRINE: NEGATIVE for temperature intolerance, skin/hair changes  HEME: NEGATIVE for bleeding problems  PSYCHIATRIC: NEGATIVE for changes in mood or affect    Patient Active Problem List    Diagnosis Date Noted     Left inguinal hernia 04/05/2021     Priority: Medium     Added automatically from request for surgery [id number]       Ventral hernia 04/05/2021     Priority: Medium     Added automatically from request for surgery [id number]       Prostate cancer (H) 08/13/2020     Priority: Medium     Added automatically from request for surgery [id number]       JASSON (obstructive sleep apnea) 09/18/2018     Priority: Medium     HST, 9/17/2018, AHI: 57        Essential hypertension 01/05/2016     Priority: Medium     Hip pain 11/25/2014     Priority: Medium      Past Medical History:   Diagnosis Date     Arthritis      Hypertension      Prostate cancer (H)      Sleep apnea     uses CPAP     Past Surgical History:   Procedure Laterality Date     ARTHROPLASTY HIP ANTERIOR Left 11/25/2014    Procedure: ARTHROPLASTY HIP ANTERIOR;  Surgeon: Drew Phelps MD;  Location: [facility] OR     DAVINCI PROSTATECTOMY, LYMPHADENECTOMY Bilateral 12/16/2020     "Procedure: PROSTATECTOMY, ROBOT-ASSISTED, WITH PELVIC LYMPHADENECTOMY, POSSIBLE OPEN;  Surgeon: Power Barrera MD;  Location: [facility] OR     ENT SURGERY      jaw repair for dental reasons     PROSTATE SURGERY       Current Outpatient Medications   Medication Sig Dispense Refill     lisinopril-hydrochlorothiazide (ZESTORETIC) 20-25 MG tablet Take 1 tablet by mouth daily 90 tablet 3     multivitamin, therapeutic with minerals (MULTI-VITAMIN) TABS Take 1 tablet by mouth daily         No Known Allergies     Social History     Tobacco Use     Smoking status: Never Smoker     Smokeless tobacco: Never Used   Substance Use Topics     Alcohol use: Yes     Comment: 3 on weekends     Family History   Problem Relation Age of Onset     Other Cancer Mother      Heart Surgery Father      Diabetes No family hx of      Colon Cancer No family hx of      History   Drug Use No         Objective     /79 (BP Location: Left arm, Patient Position: Sitting, Cuff Size: Adult Large)   Pulse 90   Temp 98.5  F (36.9  C) (Oral)   Ht 1.778 m (5' 10\")   Wt 90.3 kg (199 lb)   SpO2 98%   BMI 28.55 kg/m      Physical Exam    GENERAL APPEARANCE: healthy, alert and no distress     EYES: EOMI,  PERRL     HENT: ear canals and TM's normal and nose and mouth without ulcers or lesions     NECK: no adenopathy, no asymmetry, masses, or scars and thyroid normal to palpation     RESP: lungs clear to auscultation - no rales, rhonchi or wheezes     CV: regular rates and rhythm, normal S1 S2, no S3 or S4 and no murmur, click or rub     ABDOMEN:  soft, nontender, no HSM or masses and bowel sounds normal  Small reducible umbilical hernia , left inguinal hernia, reduced, no pain.      MS: extremities normal- no gross deformities noted, no evidence of inflammation in joints, FROM in all extremities.     SKIN: no suspicious lesions or rashes     NEURO: Normal strength and tone, sensory exam grossly normal, mentation intact and speech normal     PSYCH: " mentation appears normal. and affect normal/bright     LYMPHATICS: No cervical adenopathy    Recent Labs   Lab Test 12/17/20  0559 12/16/20  1728   HGB 11.5* 12.2*    182    139   POTASSIUM 4.1 4.3   CR 1.20 1.16        Diagnostics:  Labs pending at this time.  Results will be reviewed when available.   EKG: appears normal, NSR, normal axis, normal intervals, no acute ST/T changes c/w ischemia, no LVH by voltage criteria, unchanged from previous tracings    Revised Cardiac Risk Index (RCRI):  The patient has the following serious cardiovascular risks for perioperative complications:   - No serious cardiac risks = 0 points     RCRI Interpretation: 0 points: Class I (very low risk - 0.4% complication rate)           Signed Electronically by: Maninder Espinoza MD  Copy of this evaluation report is provided to requesting physician.

## 2021-04-27 ENCOUNTER — ANESTHESIA EVENT (OUTPATIENT)
Dept: SURGERY | Facility: CLINIC | Age: 61
End: 2021-04-27
Payer: COMMERCIAL

## 2021-04-27 DIAGNOSIS — Z11.59 ENCOUNTER FOR SCREENING FOR OTHER VIRAL DISEASES: ICD-10-CM

## 2021-04-27 PROCEDURE — U0003 INFECTIOUS AGENT DETECTION BY NUCLEIC ACID (DNA OR RNA); SEVERE ACUTE RESPIRATORY SYNDROME CORONAVIRUS 2 (SARS-COV-2) (CORONAVIRUS DISEASE [COVID-19]), AMPLIFIED PROBE TECHNIQUE, MAKING USE OF HIGH THROUGHPUT TECHNOLOGIES AS DESCRIBED BY CMS-2020-01-R: HCPCS | Performed by: SURGERY

## 2021-04-27 PROCEDURE — U0005 INFEC AGEN DETEC AMPLI PROBE: HCPCS | Performed by: SURGERY

## 2021-04-28 LAB
LABORATORY COMMENT REPORT: NORMAL
SARS-COV-2 RNA RESP QL NAA+PROBE: NEGATIVE
SARS-COV-2 RNA RESP QL NAA+PROBE: NORMAL
SPECIMEN SOURCE: NORMAL
SPECIMEN SOURCE: NORMAL

## 2021-04-29 ENCOUNTER — HOSPITAL ENCOUNTER (OUTPATIENT)
Facility: CLINIC | Age: 61
Discharge: HOME | End: 2021-04-29
Attending: SURGERY | Admitting: SURGERY
Payer: COMMERCIAL

## 2021-04-29 ENCOUNTER — SURGERY (OUTPATIENT)
Age: 61
End: 2021-04-29
Payer: COMMERCIAL

## 2021-04-29 ENCOUNTER — OFFICE VISIT (OUTPATIENT)
Dept: SURGERY | Facility: PHYSICIAN GROUP | Age: 61
End: 2021-04-29
Payer: COMMERCIAL

## 2021-04-29 ENCOUNTER — ANESTHESIA (OUTPATIENT)
Dept: SURGERY | Facility: CLINIC | Age: 61
End: 2021-04-29
Payer: COMMERCIAL

## 2021-04-29 VITALS
BODY MASS INDEX: 28.49 KG/M2 | RESPIRATION RATE: 16 BRPM | WEIGHT: 199 LBS | DIASTOLIC BLOOD PRESSURE: 71 MMHG | TEMPERATURE: 97.5 F | HEIGHT: 70 IN | HEART RATE: 69 BPM | OXYGEN SATURATION: 97 % | SYSTOLIC BLOOD PRESSURE: 106 MMHG

## 2021-04-29 DIAGNOSIS — K43.9 VENTRAL HERNIA: ICD-10-CM

## 2021-04-29 DIAGNOSIS — K40.90 LEFT INGUINAL HERNIA: ICD-10-CM

## 2021-04-29 PROCEDURE — 250N000011 HC RX IP 250 OP 636: Performed by: NURSE ANESTHETIST, CERTIFIED REGISTERED

## 2021-04-29 PROCEDURE — 710N000012 HC RECOVERY PHASE 2, PER MINUTE: Performed by: SURGERY

## 2021-04-29 PROCEDURE — 49560 PR REPAIR INCISIONAL HERNIA,REDUCIBLE: CPT | Mod: AS | Performed by: PHYSICIAN ASSISTANT

## 2021-04-29 PROCEDURE — 999N000141 HC STATISTIC PRE-PROCEDURE NURSING ASSESSMENT: Performed by: SURGERY

## 2021-04-29 PROCEDURE — 360N000075 HC SURGERY LEVEL 2, PER MIN: Performed by: SURGERY

## 2021-04-29 PROCEDURE — 250N000013 HC RX MED GY IP 250 OP 250 PS 637: Performed by: ANESTHESIOLOGY

## 2021-04-29 PROCEDURE — 49568 PR REPAIR HERNIA WITH MESH: CPT | Mod: AS | Performed by: PHYSICIAN ASSISTANT

## 2021-04-29 PROCEDURE — 250N000009 HC RX 250: Performed by: NURSE ANESTHETIST, CERTIFIED REGISTERED

## 2021-04-29 PROCEDURE — 710N000009 HC RECOVERY PHASE 1, LEVEL 1, PER MIN: Performed by: SURGERY

## 2021-04-29 PROCEDURE — 272N000001 HC OR GENERAL SUPPLY STERILE: Performed by: SURGERY

## 2021-04-29 PROCEDURE — 258N000003 HC RX IP 258 OP 636: Performed by: ANESTHESIOLOGY

## 2021-04-29 PROCEDURE — 258N000003 HC RX IP 258 OP 636: Performed by: NURSE ANESTHETIST, CERTIFIED REGISTERED

## 2021-04-29 PROCEDURE — C1781 MESH (IMPLANTABLE): HCPCS | Performed by: SURGERY

## 2021-04-29 PROCEDURE — 370N000017 HC ANESTHESIA TECHNICAL FEE, PER MIN: Performed by: SURGERY

## 2021-04-29 PROCEDURE — 49505 PRP I/HERN INIT REDUC >5 YR: CPT | Mod: LT | Performed by: SURGERY

## 2021-04-29 PROCEDURE — 49505 PRP I/HERN INIT REDUC >5 YR: CPT | Mod: AS | Performed by: PHYSICIAN ASSISTANT

## 2021-04-29 PROCEDURE — 250N000009 HC RX 250: Performed by: SURGERY

## 2021-04-29 PROCEDURE — 49568 PR REPAIR HERNIA WITH MESH: CPT | Mod: XU | Performed by: SURGERY

## 2021-04-29 PROCEDURE — 49560 PR REPAIR INCISIONAL HERNIA,REDUCIBLE: CPT | Performed by: SURGERY

## 2021-04-29 DEVICE — MESH ULTRAPRO HERNIA 10CM UHSL6: Type: IMPLANTABLE DEVICE | Site: GROIN | Status: FUNCTIONAL

## 2021-04-29 DEVICE — MESH VENTRALEX HERNIA 2.5" CIRCLE MED W/STRAP 5950008: Type: IMPLANTABLE DEVICE | Site: ABDOMEN | Status: FUNCTIONAL

## 2021-04-29 RX ORDER — ACETAMINOPHEN 325 MG/1
975 TABLET ORAL ONCE
Status: COMPLETED | OUTPATIENT
Start: 2021-04-29 | End: 2021-04-29

## 2021-04-29 RX ORDER — EPHEDRINE SULFATE 50 MG/ML
INJECTION, SOLUTION INTRAMUSCULAR; INTRAVENOUS; SUBCUTANEOUS PRN
Status: DISCONTINUED | OUTPATIENT
Start: 2021-04-29 | End: 2021-04-29

## 2021-04-29 RX ORDER — ONDANSETRON 4 MG/1
4 TABLET, ORALLY DISINTEGRATING ORAL EVERY 30 MIN PRN
Status: DISCONTINUED | OUTPATIENT
Start: 2021-04-29 | End: 2021-04-29 | Stop reason: HOSPADM

## 2021-04-29 RX ORDER — PROPOFOL 10 MG/ML
INJECTION, EMULSION INTRAVENOUS PRN
Status: DISCONTINUED | OUTPATIENT
Start: 2021-04-29 | End: 2021-04-29

## 2021-04-29 RX ORDER — ONDANSETRON 2 MG/ML
4 INJECTION INTRAMUSCULAR; INTRAVENOUS EVERY 30 MIN PRN
Status: DISCONTINUED | OUTPATIENT
Start: 2021-04-29 | End: 2021-04-29 | Stop reason: HOSPADM

## 2021-04-29 RX ORDER — SODIUM CHLORIDE, SODIUM LACTATE, POTASSIUM CHLORIDE, CALCIUM CHLORIDE 600; 310; 30; 20 MG/100ML; MG/100ML; MG/100ML; MG/100ML
INJECTION, SOLUTION INTRAVENOUS CONTINUOUS
Status: DISCONTINUED | OUTPATIENT
Start: 2021-04-29 | End: 2021-04-29 | Stop reason: HOSPADM

## 2021-04-29 RX ORDER — OXYCODONE HYDROCHLORIDE 5 MG/1
5 TABLET ORAL ONCE
Status: COMPLETED | OUTPATIENT
Start: 2021-04-29 | End: 2021-04-29

## 2021-04-29 RX ORDER — NALOXONE HYDROCHLORIDE 0.4 MG/ML
0.2 INJECTION, SOLUTION INTRAMUSCULAR; INTRAVENOUS; SUBCUTANEOUS
Status: DISCONTINUED | OUTPATIENT
Start: 2021-04-29 | End: 2021-04-29 | Stop reason: HOSPADM

## 2021-04-29 RX ORDER — DEXAMETHASONE SODIUM PHOSPHATE 4 MG/ML
INJECTION, SOLUTION INTRA-ARTICULAR; INTRALESIONAL; INTRAMUSCULAR; INTRAVENOUS; SOFT TISSUE PRN
Status: DISCONTINUED | OUTPATIENT
Start: 2021-04-29 | End: 2021-04-29

## 2021-04-29 RX ORDER — LABETALOL HYDROCHLORIDE 5 MG/ML
10 INJECTION, SOLUTION INTRAVENOUS
Status: DISCONTINUED | OUTPATIENT
Start: 2021-04-29 | End: 2021-04-29 | Stop reason: HOSPADM

## 2021-04-29 RX ORDER — FENTANYL CITRATE 50 UG/ML
25-50 INJECTION, SOLUTION INTRAMUSCULAR; INTRAVENOUS
Status: DISCONTINUED | OUTPATIENT
Start: 2021-04-29 | End: 2021-04-29 | Stop reason: HOSPADM

## 2021-04-29 RX ORDER — NEOSTIGMINE METHYLSULFATE 1 MG/ML
VIAL (ML) INJECTION PRN
Status: DISCONTINUED | OUTPATIENT
Start: 2021-04-29 | End: 2021-04-29

## 2021-04-29 RX ORDER — GLYCOPYRROLATE 0.2 MG/ML
INJECTION, SOLUTION INTRAMUSCULAR; INTRAVENOUS PRN
Status: DISCONTINUED | OUTPATIENT
Start: 2021-04-29 | End: 2021-04-29

## 2021-04-29 RX ORDER — NALOXONE HYDROCHLORIDE 0.4 MG/ML
0.4 INJECTION, SOLUTION INTRAMUSCULAR; INTRAVENOUS; SUBCUTANEOUS
Status: DISCONTINUED | OUTPATIENT
Start: 2021-04-29 | End: 2021-04-29 | Stop reason: HOSPADM

## 2021-04-29 RX ORDER — HYDROMORPHONE HYDROCHLORIDE 1 MG/ML
.3-.5 INJECTION, SOLUTION INTRAMUSCULAR; INTRAVENOUS; SUBCUTANEOUS EVERY 10 MIN PRN
Status: DISCONTINUED | OUTPATIENT
Start: 2021-04-29 | End: 2021-04-29 | Stop reason: HOSPADM

## 2021-04-29 RX ORDER — FENTANYL CITRATE 50 UG/ML
INJECTION, SOLUTION INTRAMUSCULAR; INTRAVENOUS PRN
Status: DISCONTINUED | OUTPATIENT
Start: 2021-04-29 | End: 2021-04-29

## 2021-04-29 RX ORDER — OXYCODONE HYDROCHLORIDE 5 MG/1
5-10 TABLET ORAL EVERY 4 HOURS PRN
Qty: 10 TABLET | Refills: 0 | Status: SHIPPED | OUTPATIENT
Start: 2021-04-29 | End: 2021-11-15

## 2021-04-29 RX ORDER — LIDOCAINE HYDROCHLORIDE 10 MG/ML
INJECTION, SOLUTION INFILTRATION; PERINEURAL PRN
Status: DISCONTINUED | OUTPATIENT
Start: 2021-04-29 | End: 2021-04-29

## 2021-04-29 RX ORDER — ALBUTEROL SULFATE 0.83 MG/ML
2.5 SOLUTION RESPIRATORY (INHALATION) EVERY 4 HOURS PRN
Status: DISCONTINUED | OUTPATIENT
Start: 2021-04-29 | End: 2021-04-29 | Stop reason: HOSPADM

## 2021-04-29 RX ORDER — BUPIVACAINE HYDROCHLORIDE AND EPINEPHRINE 2.5; 5 MG/ML; UG/ML
INJECTION, SOLUTION EPIDURAL; INFILTRATION; INTRACAUDAL; PERINEURAL PRN
Status: DISCONTINUED | OUTPATIENT
Start: 2021-04-29 | End: 2021-04-29 | Stop reason: HOSPADM

## 2021-04-29 RX ORDER — LIDOCAINE 40 MG/G
CREAM TOPICAL
Status: DISCONTINUED | OUTPATIENT
Start: 2021-04-29 | End: 2021-04-29 | Stop reason: HOSPADM

## 2021-04-29 RX ORDER — HYDRALAZINE HYDROCHLORIDE 20 MG/ML
2.5-5 INJECTION INTRAMUSCULAR; INTRAVENOUS EVERY 10 MIN PRN
Status: DISCONTINUED | OUTPATIENT
Start: 2021-04-29 | End: 2021-04-29 | Stop reason: HOSPADM

## 2021-04-29 RX ORDER — CELECOXIB 200 MG/1
400 CAPSULE ORAL ONCE
Status: COMPLETED | OUTPATIENT
Start: 2021-04-29 | End: 2021-04-29

## 2021-04-29 RX ORDER — GLYCINE 1.5 G/100ML
SOLUTION IRRIGATION PRN
Status: DISCONTINUED | OUTPATIENT
Start: 2021-04-29 | End: 2021-04-29 | Stop reason: HOSPADM

## 2021-04-29 RX ORDER — ONDANSETRON 2 MG/ML
INJECTION INTRAMUSCULAR; INTRAVENOUS PRN
Status: DISCONTINUED | OUTPATIENT
Start: 2021-04-29 | End: 2021-04-29

## 2021-04-29 RX ORDER — CEFAZOLIN SODIUM 2 G/100ML
INJECTION, SOLUTION INTRAVENOUS PRN
Status: DISCONTINUED | OUTPATIENT
Start: 2021-04-29 | End: 2021-04-29

## 2021-04-29 RX ORDER — MEPERIDINE HYDROCHLORIDE 25 MG/ML
12.5 INJECTION INTRAMUSCULAR; INTRAVENOUS; SUBCUTANEOUS
Status: DISCONTINUED | OUTPATIENT
Start: 2021-04-29 | End: 2021-04-29 | Stop reason: HOSPADM

## 2021-04-29 RX ADMIN — Medication 5 MG: at 07:46

## 2021-04-29 RX ADMIN — CEFAZOLIN SODIUM 2 G: 2 INJECTION, SOLUTION INTRAVENOUS at 07:42

## 2021-04-29 RX ADMIN — PROPOFOL 30 MG: 10 INJECTION, EMULSION INTRAVENOUS at 09:02

## 2021-04-29 RX ADMIN — DEXAMETHASONE SODIUM PHOSPHATE 4 MG: 4 INJECTION, SOLUTION INTRA-ARTICULAR; INTRALESIONAL; INTRAMUSCULAR; INTRAVENOUS; SOFT TISSUE at 07:34

## 2021-04-29 RX ADMIN — ACETAMINOPHEN 975 MG: 325 TABLET, FILM COATED ORAL at 06:35

## 2021-04-29 RX ADMIN — GLYCINE 150 ML: 1.5 SOLUTION IRRIGATION at 09:19

## 2021-04-29 RX ADMIN — ROCURONIUM BROMIDE 10 MG: 10 INJECTION INTRAVENOUS at 08:38

## 2021-04-29 RX ADMIN — GLYCOPYRROLATE 0.2 MG: 0.2 INJECTION, SOLUTION INTRAMUSCULAR; INTRAVENOUS at 08:35

## 2021-04-29 RX ADMIN — DEXAMETHASONE SODIUM PHOSPHATE 4 MG: 4 INJECTION, SOLUTION INTRA-ARTICULAR; INTRALESIONAL; INTRAMUSCULAR; INTRAVENOUS; SOFT TISSUE at 08:51

## 2021-04-29 RX ADMIN — ROCURONIUM BROMIDE 15 MG: 10 INJECTION INTRAVENOUS at 08:17

## 2021-04-29 RX ADMIN — MIDAZOLAM 2 MG: 1 INJECTION INTRAMUSCULAR; INTRAVENOUS at 07:30

## 2021-04-29 RX ADMIN — FENTANYL CITRATE 50 MCG: 50 INJECTION, SOLUTION INTRAMUSCULAR; INTRAVENOUS at 08:46

## 2021-04-29 RX ADMIN — CELECOXIB 400 MG: 200 CAPSULE ORAL at 06:34

## 2021-04-29 RX ADMIN — ONDANSETRON HYDROCHLORIDE 4 MG: 2 INJECTION, SOLUTION INTRAVENOUS at 09:12

## 2021-04-29 RX ADMIN — LIDOCAINE HYDROCHLORIDE 50 MG: 10 INJECTION, SOLUTION INFILTRATION; PERINEURAL at 07:33

## 2021-04-29 RX ADMIN — ROCURONIUM BROMIDE 5 MG: 10 INJECTION INTRAVENOUS at 08:31

## 2021-04-29 RX ADMIN — SODIUM CHLORIDE, POTASSIUM CHLORIDE, SODIUM LACTATE AND CALCIUM CHLORIDE: 600; 310; 30; 20 INJECTION, SOLUTION INTRAVENOUS at 06:43

## 2021-04-29 RX ADMIN — BUPIVACAINE HYDROCHLORIDE AND EPINEPHRINE BITARTRATE 30 ML: 2.5; .0091 INJECTION, SOLUTION EPIDURAL; INFILTRATION; INTRACAUDAL; PERINEURAL at 09:10

## 2021-04-29 RX ADMIN — GLYCOPYRROLATE 0.2 MG: 0.2 INJECTION, SOLUTION INTRAMUSCULAR; INTRAVENOUS at 07:46

## 2021-04-29 RX ADMIN — PHENYLEPHRINE HYDROCHLORIDE 50 MCG: 10 INJECTION INTRAVENOUS at 08:28

## 2021-04-29 RX ADMIN — ROCURONIUM BROMIDE 50 MG: 10 INJECTION INTRAVENOUS at 07:36

## 2021-04-29 RX ADMIN — NEOSTIGMINE METHYLSULFATE 2.5 MG: 1 INJECTION, SOLUTION INTRAVENOUS at 09:14

## 2021-04-29 RX ADMIN — OXYCODONE HYDROCHLORIDE 5 MG: 5 TABLET ORAL at 10:55

## 2021-04-29 RX ADMIN — GLYCOPYRROLATE 0.4 MG: 0.2 INJECTION, SOLUTION INTRAMUSCULAR; INTRAVENOUS at 09:14

## 2021-04-29 RX ADMIN — ROCURONIUM BROMIDE 5 MG: 10 INJECTION INTRAVENOUS at 08:23

## 2021-04-29 RX ADMIN — PROPOFOL 160 MG: 10 INJECTION, EMULSION INTRAVENOUS at 07:34

## 2021-04-29 RX ADMIN — FENTANYL CITRATE 50 MCG: 50 INJECTION, SOLUTION INTRAMUSCULAR; INTRAVENOUS at 08:56

## 2021-04-29 RX ADMIN — FENTANYL CITRATE 50 MCG: 50 INJECTION, SOLUTION INTRAMUSCULAR; INTRAVENOUS at 09:03

## 2021-04-29 RX ADMIN — SODIUM CHLORIDE, POTASSIUM CHLORIDE, SODIUM LACTATE AND CALCIUM CHLORIDE: 600; 310; 30; 20 INJECTION, SOLUTION INTRAVENOUS at 09:16

## 2021-04-29 RX ADMIN — Medication 5 MG: at 07:55

## 2021-04-29 RX ADMIN — Medication 5 MG: at 07:53

## 2021-04-29 RX ADMIN — FENTANYL CITRATE 150 MCG: 50 INJECTION, SOLUTION INTRAMUSCULAR; INTRAVENOUS at 07:33

## 2021-04-29 ASSESSMENT — ENCOUNTER SYMPTOMS: DYSRHYTHMIAS: 0

## 2021-04-29 ASSESSMENT — MIFFLIN-ST. JEOR: SCORE: 1713.91

## 2021-04-29 NOTE — OP NOTE
General Surgery Operative Note    Pre-operative diagnosis:   Ventral hernia [K43.9]   Post-operative diagnosis: same   Procedure: Ventral Herniorrhaphy with Bard Ventralex ST Hernia Patch    Surgeon: Sourav Simon MD   Assistant(s): Ligia Salgado PA-C   Anesthesia: General    Estimated blood loss: 5 cc's   Drains placed: None   Complications:  None   Findings:     Specimens: * No specimens in log *    Indications: This 60-year-old male has developed a symptomatic ventral hernia.  He requests repair.  The procedure, risk, benefits, complications, the use of mesh, the risk of mesh infection and potential need to remove the mesh would become infected, incision, scarring, potential loss of the umbilicus and recurrence risk were all reviewed with him preoperatively.  He is reviewed literature on the subject, all his questions have been answered and he would like to proceed with surgery.    Description: Patient brought the operating room placed upon the table and after induction of anesthetic the central abdomen is shaved prepped and draped in a sterile manner.  A pause is performed, the site have been marked with him in preinduction.  An incision is made directly over the palpable mass and extended down to the fascia.  The hernia mass is dissected.  It consists of multiple defects.  One of the defects is directly beneath the umbilicus.  This is very carefully  from the umbilical skin sharply.  Multiple defects are combined into a single defect.  The preperitoneal space was developed to accept preperitoneal mesh placement.  The veins in the preperitoneal layer appeared to be somewhat engorged.  These were carefully avoided and no bleeding issues developed.  The operative site and the mesh were both soaked with irrisept and the mesh was flatly deployed into the preperitoneal space.  The fascia is then closed with heavy PDS sutures, incorporating the mesh into the closure.  Subcutaneous tissues are closed with  3-0 Vicryl and skin with 4-0 Vicryl followed by Dermabond.  Abdominal binder is applied and he is returned to the recovery room in excellent condition with all sponge and needle counts correct, having tolerated the procedure well.    Sourav Simon MD

## 2021-04-29 NOTE — ANESTHESIA CARE TRANSFER NOTE
Patient: Jeramie Sánchez    Procedure(s):  OPEN LEFT INGUINAL HERNIA REPAIR WITH MESH  OPEN VENTRAL HERNIA REPAIR WITH MESH    Diagnosis: Left inguinal hernia [K40.90]  Ventral hernia [K43.9]  Diagnosis Additional Information: No value filed.    Anesthesia Type:   General     Note:    Oropharynx: oropharynx clear of all foreign objects and spontaneously breathing  Level of Consciousness: awake  Oxygen Supplementation: face mask  Level of Supplemental Oxygen (L/min / FiO2): 8L  Independent Airway: airway patency satisfactory and stable  Dentition: dentition unchanged  Vital Signs Stable: post-procedure vital signs reviewed and stable  Report to RN Given: handoff report given  Patient transferred to: PACU    Handoff Report: Identifed the Patient, Identified the Reponsible Provider, Reviewed the pertinent medical history, Discussed the surgical course, Reviewed Intra-OP anesthesia mangement and issues during anesthesia, Set expectations for post-procedure period and Allowed opportunity for questions and acknowledgement of understanding      Vitals: (Last set prior to Anesthesia Care Transfer)  CRNA VITALS  4/29/2021 0855 - 4/29/2021 0933      4/29/2021             SpO2:  99 %        Electronically Signed By: IRENE Rico CRNA  April 29, 2021  9:33 AM

## 2021-04-29 NOTE — OP NOTE
General Surgery Operative Note      Pre-operative diagnosis: Left inguinal hernia   Post-operative diagnosis: Left inguinal hernia, indirect    Procedure: Indirect left inguinal hernia repair with Ultrapro hernia system (large size)   Surgeon: Sourav Simon MD   Assistant(s): Ligia Salgado PA-C  A PA was medically necessary for their expertise in prepping, retracting, exposure, and suctioning.   Anesthesia: General    Estimated blood loss:  Complications: 5 cc  None   Specimens: * No specimens in log *     DESCRIPTION OF PROCEDURE:  The patient was placed on the table in supine position.  General endotracheal anesthesia was induced and the abdomen was prepped and draped in sterile fashion.  A pause was performed, the site had been marked with the patient in pre-induction.  An inguinal incision was made on the left side and was carried down through the subcutaneous tissue.  The external oblique aponeurosis was cleared of subcutaneous tissue.  The external oblique aponeurosis was incised parallel to its fibers through the external ring.  Flaps were developed superomedially and inferolaterally.  The cord was encircled with a penrose drain at the level of the pubic tubercle. This allowed for good exposure of the inguinal canal.  This revealed an intact floor of the inguinal canal.  We then performed a thorough evaluation of the spermatic cord. Cremaster fibers were split and the nerve was preserved.  There was a moderate indirect hernia defect noted.  The sac was mobilized from normal cord structures and reduced.  The pre-peritoneal space was developed to accept the inner layer of the Ultrapro hernia system. The operative field and the Ultrapro mesh were irrigated with irricept and rinsed. The inner layer of the mesh was carefully deployed in the preperitoneal space. The outer layer was deployed. A slit had been cut for egress of the cord and nerve. This was coapted adjacent to the cord, thus re-enforcing the  internal ring. The medial portion of the outer layer was sutured to the inguinal ligament (wrapping the ligament with the mesh), overlapped and sutured adjacent to the pubic tubercle and then sutured to the anterior rectus sheath as far medially as possible. The other portion of the outer layer was tucked up under the external oblique aponeurosis.  The cord was evaluated with a sterile doppler and excellent arterial and venous signals were noted. There was no venous engorgement of the cord.   Local anesthetic was placed for post op pain relief. .  We inspected for hemostasis and irrigated with sterile saline.  We closed the external oblique aponeurosis with a 2-0 PDS suture in running fashion.  The Sienna's fascia was closed with 2-0 PDS sutures.  The skin was closed with a running 4-0 Vicryl subcuticular suture and Dermabond was applied.  The patient tolerated the procedure well.  Sponge, needle and instrument counts were correct at the end of the case. The testicle position was verified after the procedure.    Sourav Simon MD

## 2021-04-29 NOTE — ANESTHESIA POSTPROCEDURE EVALUATION
Patient: Jeramie Sánchez    Procedure(s):  OPEN LEFT INGUINAL HERNIA REPAIR WITH MESH  OPEN VENTRAL HERNIA REPAIR WITH MESH    Diagnosis:Left inguinal hernia [K40.90]  Ventral hernia [K43.9]  Diagnosis Additional Information: No value filed.    Anesthesia Type:  General    Note:  Disposition: Outpatient   Postop Pain Control: Uneventful            Sign Out: Well controlled pain   PONV: No   Neuro/Psych: Uneventful            Sign Out: Acceptable/Baseline neuro status   Airway/Respiratory: Uneventful            Sign Out: Acceptable/Baseline resp. status   CV/Hemodynamics: Uneventful            Sign Out: Acceptable CV status   Other NRE: NONE   DID A NON-ROUTINE EVENT OCCUR? No           Last vitals:  Vitals:    04/29/21 0950 04/29/21 0955 04/29/21 1000   BP: 128/84 128/84 111/81   Pulse: 82 83 88   Resp: (!) 32 16 20   Temp:   98.4  F (36.9  C)   SpO2: 96% 94% 94%       Last vitals prior to Anesthesia Care Transfer:  CRNA VITALS  4/29/2021 0855 - 4/29/2021 0955      4/29/2021             SpO2:  99 %    Resp Rate (observed):  10          Electronically Signed By: Sherif Cam MD  April 29, 2021  10:21 AM

## 2021-04-29 NOTE — ANESTHESIA PROCEDURE NOTES
Airway       Patient location during procedure: OR       Procedure Start/Stop Times: 4/29/2021 8:04 AM  Staff -        CRNA: Ti Cam APRN CRNA       Performed By: CRNA  Consent for Airway        Urgency: elective  Indications and Patient Condition       Indications for airway management: gaviota-procedural       Induction type:intravenous       Mask difficulty assessment: 2 - vent by mask + OA or adjuvant +/- NMBA    Final Airway Details       Final airway type: endotracheal airway       Successful airway: ETT - single and Oral  Endotracheal Airway Details        ETT size (mm): 8.0       Cuffed: yes       Successful intubation technique: video laryngoscopy and direct laryngoscopy       VL Blade Size: Glidescope 4       Grade View of Cords: 4       Adjucts: stylet       Position: Right       Measured from: lips       Secured at (cm): 25       Bite block used: Soft    Post intubation assessment        Placement verified by: capnometry, equal breath sounds and chest rise        Number of attempts at approach: 4 or more       Number of other approaches attempted: 1       Secured with: plastic tape       Ease of procedure: difficult       Dentition: Lips/oral mucosa injury    Medication(s) Administered   Medication Administration Time: 4/29/2021 7:40 AM    Additional Comments       With DL, unable to get blade under epigottis.  MDA and CRNA tried.  With GL, good view of cords, but lot of tissue in oral pharynx.  3 mm cut on left lower lip.  Teeth same.  Ice pack placed on lip for 15 minutes on, 15 minutes off during surgery.

## 2021-04-29 NOTE — ANESTHESIA PREPROCEDURE EVALUATION
Anesthesia Pre-Procedure Evaluation    Patient: Jeramie Sánchez   MRN: [id number] : 1960        Preoperative Diagnosis: Left inguinal hernia [K40.90]  Ventral hernia [K43.9]   Procedure : Procedure(s):  OPEN LEFT INGUINAL HERNIA REPAIR WITH MESH  OPEN VENTRAL HERNIA REPAIR WITH MESH     Past Medical History:   Diagnosis Date     Arthritis      Hypertension      Prostate cancer ([facility])      Sleep apnea     uses CPAP      Past Surgical History:   Procedure Laterality Date     ARTHROPLASTY HIP ANTERIOR Left 2014    Procedure: ARTHROPLASTY HIP ANTERIOR;  Surgeon: Drew Phelps MD;  Location: [facility] OR     DAVINCI PROSTATECTOMY, LYMPHADENECTOMY Bilateral 2020    Procedure: PROSTATECTOMY, ROBOT-ASSISTED, WITH PELVIC LYMPHADENECTOMY, POSSIBLE OPEN;  Surgeon: Power Barrera MD;  Location: [facility] OR     ENT SURGERY      jaw repair for dental reasons     PROSTATE SURGERY        No Known Allergies   Social History     Tobacco Use     Smoking status: Never Smoker     Smokeless tobacco: Never Used   Substance Use Topics     Alcohol use: Yes     Comment: 3 on weekends      Wt Readings from Last 1 Encounters:   21 90.3 kg (199 lb)        Anesthesia Evaluation   Pt has had prior anesthetic. Type: General.    No history of anesthetic complications       ROS/MED HX  ENT/Pulmonary:     (+) sleep apnea, uses CPAP,     Neurologic:  - neg neurologic ROS     Cardiovascular:     (+) hypertension----- (-) CAD and arrhythmias   METS/Exercise Tolerance:     Hematologic:  - neg hematologic  ROS     Musculoskeletal:   (+) arthritis,     GI/Hepatic:  - neg GI/hepatic ROS     Renal/Genitourinary:  - neg Renal ROS     Endo:  - neg endo ROS     Psychiatric/Substance Use:  - neg psychiatric ROS     Infectious Disease:  - neg infectious disease ROS     Malignancy:  - neg malignancy ROS     Other:            Physical Exam    Airway        Mallampati: II   TM distance: > 3 FB   Neck ROM: full   Mouth opening: > 3  cm    Respiratory Devices and Support         Dental  no notable dental history         Cardiovascular   cardiovascular exam normal          Pulmonary   pulmonary exam normal                OUTSIDE LABS:  CBC:   Lab Results   Component Value Date    WBC 7.9 12/17/2020    WBC 8.4 12/16/2020    HGB 13.2 (L) 04/19/2021    HGB 11.5 (L) 12/17/2020    HCT 35.7 (L) 12/17/2020    HCT 38.9 (L) 12/16/2020     12/17/2020     12/16/2020     BMP:   Lab Results   Component Value Date     04/19/2021     12/17/2020    POTASSIUM 3.6 04/19/2021    POTASSIUM 4.1 12/17/2020    CHLORIDE 103 04/19/2021    CHLORIDE 105 12/17/2020    CO2 32 04/19/2021    CO2 28 12/17/2020    BUN 21 04/19/2021    BUN 25 12/17/2020    CR 1.21 04/19/2021    CR 1.20 12/17/2020     (H) 04/19/2021     (H) 12/17/2020     COAGS: No results found for: PTT, INR, FIBR  POC: No results found for: BGM, HCG, HCGS  HEPATIC:   Lab Results   Component Value Date    ALBUMIN 4.0 11/30/2020    PROTTOTAL 7.5 11/30/2020    ALT 22 11/30/2020    AST 15 11/30/2020    ALKPHOS 71 11/30/2020    BILITOTAL 0.4 11/30/2020     OTHER:   Lab Results   Component Value Date    YAJAIRA 9.4 04/19/2021    TSH 0.78 03/13/2020       Anesthesia Plan    ASA Status:  2   NPO Status:  NPO Appropriate    Anesthesia Type: General.     - Airway: ETT   Induction: Intravenous.   Maintenance: Balanced.        Consents    Anesthesia Plan(s) and associated risks, benefits, and realistic alternatives discussed. Questions answered and patient/representative(s) expressed understanding.     - Discussed with:  Patient      - Extended Intubation/Ventilatory Support Discussed: No.      - Patient is DNR/DNI Status: No    Use of blood products discussed: No .     Postoperative Care    Pain management: IV analgesics, Oral pain medications, Multi-modal analgesia.   PONV prophylaxis: Ondansetron (or other 5HT-3), Dexamethasone or Solumedrol     Comments:                Sherif SPEAR  MD Tutu

## 2021-04-29 NOTE — DISCHARGE INSTRUCTIONS
HOME CARE FOLLOWING INGUINAL/FEMORAL HERNIA REPAIR  ANISH Conde, UMER Meek, RAYO Diggs, FREYA Barron    DIET:  Start with liquids and gradually resume your regular diet as tolerated.  Drink plenty of fluids.  While taking pain medications, consider use of a stool softener, increase your fiber in your diet, or add a fiber supplement (like Metamucil, Citrucel) to help prevent constipation - a possible side effect of pain medications.    NAUSEA:  If nauseated from the anesthetic/pain meds; rest in bed, get up cautiously with assistance, and drink clear liquids (juice, tea, broth).    ACTIVITY:  Light Activity -- you may immediately be up and about as tolerated.  Walking is encouraged, increase as tolerated.  Driving/Light Work-- when comfortable and off narcotic pain medications.  Strenuous Work/Activity -- limit lifting to 20 pounds for 3 weeks.  Active Sports (running, biking, etc.) -- cautiously resume after 4 weeks.    INCISIONAL CARE:    If you have a dressing in place, keep clean and dry for 48 hours; you may replace the gauze if it becomes soiled.    After 48 hours you may remove the dressing and shower.  Do not submerse incision in water for 1 week.    If you have a Dermabond dressing (a type of skin glue), you may shower immediately.    Sutures will absorb and need not be removed.    If present, leave the steri-strips (white paper tapes) in place for 14 days after surgery.    If present, leave Dermabond glue in place until it wears/flakes off.    Do not apply lotions, creams, or ointments to incisions.    Expect a variable amount of swelling/black and blue discoloration that may involve the penis/scrotum or labia.    Some numbness around the incision is common.    A lump/ridge under the incision is normal and will gradually resolve.    Wear binder for comfort    DISCOMFORT:  Local anesthetic placed at surgery should provide relief for 4-8 hours.  Begin taking pain pills before  discomfort is severe.  Take the pain medication with some food, when possible, to minimize side effects.  Intermittent use of ice packs to the hernia repair site may help during the first 1-3 weeks after surgery.  Expect gradual improvement.    Over-the-counter anti-inflammatory medications (i.e. Ibuprofen/Advil/Motrin or Naprosyn/Aleve) may be used per package instructions in addition to or while tapering off the narcotic pain medications to decrease swelling and sensitivity at the repair site.  DO NOT TAKE these Anti-inflammatory medications if your primary physician has advised against doing so, or if you have acid reflux, ulcer, or bleeding disorder, or take blood-thinner medications.  Call your primary physician or the surgery office if you have medication questions.    FOLLOW-UP AFTER SURGERY:  -Our office will contact you approximately 2-3 weeks after surgery to check on your progress and answer any questions you may have.  If you are doing well, you will not need to return for an office appointment.  If any concerns are identified over the phone, we will help you make an appointment to see a provider.    -If you have not received a phone call, have any questions or concerns, or would like to be seen, please call us at [phone].  We are located at: [address], [address]; [place] [address]    -CONTACT US IF THE FOLLOWING DEVELOPS:   1. A fever that is above 101     2. Increased redness, warmth, drainage, bleeding, or swelling.   3. Pain that is not relieved by rest/ice and your prescription.   4.  Increasing pain after 48 hours.   5. Drainage that is thick, cloudy, yellow, green or white.   6. Any other questions or concerns.      FREQUENTLY ASKED QUESTIONS:    Q:  How should my incision look?    A:  Normally your incision will appear slightly swollen with light redness directly along the incision itself as it heals.  It may feel like a bump or ridge as the healing/scarring happens, and over  time (3-4 months) this bump or ridge feeling should slowly go away.  In general, clear or pink watery drainage can be normal at first as your incision heals, but should decrease over time.    Q:  How do I know if my incision is infected?  A:  Look at your incision for signs of infection, like redness around the incision spreading to surrounding skin, or drainage of cloudy or foul-smelling drainage.  If you feel warm, check your temperature to see if you are running a fever.    **If any of these things occur, please notify the nurse at our office.  We may need you to come into the office for an incision check.      Q:  How do I take care of my incision?  A:  If you have a dressing in place - Starting the day after surgery, replace the dressing 1-2 times a day until there is no further drainage from the incision.  At that time, a dressing is no longer needed.  Try to minimize tape on the skin if irritation is occurring at the tape sites.  If you have significant irritation from tape on the skin, please call the office to discuss other method of dressing your incision.    Small pieces of tape called  steri-strips  may be present directly overlying your incision; these may be removed 10 days after surgery unless otherwise specified by your surgeon.  If these tapes start to loosen at the ends, you may trim them back until they fall off or are removed.    A:  If you had  Dermabond  tissue glue used as a dressing (this causes your incision to look shiny with a clear covering over it) - This type of dressing wears off with time and does not require more dressings over the top unless it is draining around the glue as it wears off.  Do not apply ointments or lotions over the incisions until the glue has completely worn off.    Q:  There is a piece of tape or a sticky  lead  still on my skin.  Can I remove this?  A:  Sometimes the sticky  leads  used for monitoring during surgery or for evaluation in the emergency department  are not all removed while you are in the hospital.  These sometimes have a tab or metal dot on them.  You can easily remove these on your own, like taking off a band-aid.  If there is a gel substance under the  lead , simply wipe/clean it off with a washcloth or paper towel.      Q:  What can I do to minimize constipation (very hard stools, or lack of stools)?  A:  Stay well hydrated.  Increase your dietary fiber intake or take a fiber supplement -with plenty of water.  Walk around frequently.  You may consider an over-the-counter stool-softener.  Your Pharmacist can assist you with choosing one that is stocked at your pharmacy.  Constipation is also one of the most common side effects of pain medication.  If you are using pain medication, be pro-active and try to PREVENT problems with constipation by taking the steps above BEFORE constipation becomes a problem.    Q:  What do I do if I need more pain medications?  A:  Call the office to receive refills.  Be aware that certain pain meds cannot be called into a pharmacy and actually require a paper prescription.  A change may be made in your pain med as you progress thru your recovery period or if you have side effects to certain meds.    --Pain meds are NOT refilled after 5pm on weekdays, and NOT AT ALL on the weekends, so please look ahead to prevent problems.    Q:  Why am I having a hard time sleeping now that I am at home?  A:  Many medications you receive while you are in the hospital can impact your sleep for a number of days after your surgery/hospitalization.  Decreased level of activity and naps during the day may also make sleeping at night difficult.  Try to minimize day-time naps, and get up frequently during the day to walk around your home during your recovery time.  Sleep aides may be of some help, but are not recommended for long-term use.      Q:  I am having some back discomfort.  What should I do?  A:  This may be related to certain positioning  that was required for your surgery, extended periods of time in bed, or other changes in your overall activity level.  You may try ice, heat, acetaminophen, or ibuprofen to treat this temporarily.  Note that many pain medications have acetaminophen in them and would state this on the prescription bottle.  Be sure not to exceed the maximum of 4000mg per day of acetaminophen.     **If the pain you are having does not resolve, is severe, or is a flare of back pain you have had on other occasions prior to surgery, please contact your primary physician for further recommendations or for an appointment to be examined at their office.    Q:  Why am I having headaches?  A:  Headaches can be caused by many things:  caffeine withdrawal, use of pain meds, dehydration, high blood pressure, lack of sleep, over-activity/exhaustion, flare-up of usual migraine headaches.  If you feel this is related to muscle tension (a band-like feeling around the head, or a pressure at the low-back of the head) you may try ice or heat to this area.  You may need to drink more fluids (try electrolyte drink like Gatorade), rest, or take your usual migraine medications.   **If your headaches do not resolve, worsen, are accompanied by other symptoms, or if your blood pressure is high, please call your primary physician for recommendation and/or examination.    Q:  I am unable to urinate.  What do I do?  A:  A small percentage of people can have difficulty urinating initially after surgery.  This includes being able to urinate only a very small amount at a time and feeling discomfort or pressure in the very low abdomen.  This is called  urinary retention , and is actually an urgent situation.  Proceed to your nearest Emergency department for evaluation (not an Urgent Care Center).  Sometimes the bladder does not work correctly after certain medications you receive during surgery, or related to certain procedures.  You may need to have a catheter  placed until your bladder recovers.  When planning to go to an Emergency department, it may help to call the ER to let them know you are coming in for this problem after a surgery.  This may help you get in quicker to be evaluated.  **If you have symptoms of a urinary tract infection, please contact your primary physician for the proper evaluation and treatment.        If you have other questions, please call the office Monday thru Friday between 8am and 5pm to discuss with the nurse or physician assistant.  #[phone]    There is a surgeon ON CALL on weekday evenings and over the weekend in case of urgent need only, and may be contacted at the same number.    If you are having an emergency, call 911 or proceed to your nearest emergency department.      GENERAL ANESTHESIA OR SEDATION ADULT DISCHARGE INSTRUCTIONS   SPECIAL PRECAUTIONS FOR 24 HOURS AFTER SURGERY    IT IS NOT UNUSUAL TO FEEL LIGHT-HEADED OR FAINT, UP TO 24 HOURS AFTER SURGERY OR WHILE TAKING PAIN MEDICATION.  IF YOU HAVE THESE SYMPTOMS; SIT FOR A FEW MINUTES BEFORE STANDING AND HAVE SOMEONE ASSIST YOU WHEN YOU GET UP TO WALK OR USE THE BATHROOM.    YOU SHOULD REST AND RELAX FOR THE NEXT 24 HOURS AND YOU MUST MAKE ARRANGEMENTS TO HAVE SOMEONE STAY WITH YOU FOR AT LEAST 24 HOURS AFTER YOUR DISCHARGE.  AVOID HAZARDOUS AND STRENUOUS ACTIVITIES.  DO NOT MAKE IMPORTANT DECISIONS FOR 24 HOURS.    DO NOT DRIVE ANY VEHICLE OR OPERATE MECHANICAL EQUIPMENT FOR 24 HOURS FOLLOWING THE END OF YOUR SURGERY.  EVEN THOUGH YOU MAY FEEL NORMAL, YOUR REACTIONS MAY BE AFFECTED BY THE MEDICATION YOU HAVE RECEIVED.    DO NOT DRINK ALCOHOLIC BEVERAGES FOR 24 HOURS FOLLOWING YOUR SURGERY.    DRINK CLEAR LIQUIDS (APPLE JUICE, GINGER ALE, 7-UP, BROTH, ETC.).  PROGRESS TO YOUR REGULAR DIET AS YOU FEEL ABLE.    YOU MAY HAVE A DRY MOUTH, A SORE THROAT, MUSCLES ACHES OR TROUBLE SLEEPING.  THESE SHOULD GO AWAY AFTER 24 HOURS.    CALL YOUR DOCTOR FOR ANY OF THE FOLLOWING:  SIGNS  OF INFECTION (FEVER, GROWING TENDERNESS AT THE SURGERY SITE, A LARGE AMOUNT OF DRAINAGE OR BLEEDING, SEVERE PAIN, FOUL-SMELLING DRAINAGE, REDNESS OR SWELLING.    IT HAS BEEN OVER 8 TO 10 HOURS SINCE SURGERY AND YOU ARE STILL NOT ABLE TO URINATE (PASS WATER).       Maximum acetaminophen (Tylenol) dose from all sources should not exceed 4 grams (4000 mg) per day. You received 975 mg of Tylenol at 6:30 AM

## 2021-05-18 DIAGNOSIS — I10 BENIGN ESSENTIAL HYPERTENSION: ICD-10-CM

## 2021-05-18 RX ORDER — LISINOPRIL AND HYDROCHLOROTHIAZIDE 20; 25 MG/1; MG/1
1 TABLET ORAL DAILY
Qty: 90 TABLET | Refills: 3 | Status: SHIPPED | OUTPATIENT
Start: 2021-05-18 | End: 2022-05-20

## 2021-05-18 NOTE — TELEPHONE ENCOUNTER
Prescription approved per [facility] Refill Protocol.  Sixto Nix RN, BSN

## 2021-05-20 ENCOUNTER — TELEPHONE (OUTPATIENT)
Dept: SURGERY | Facility: CLINIC | Age: 61
End: 2021-05-20

## 2021-05-20 NOTE — TELEPHONE ENCOUNTER
Attempted to call patient for post op check.  No answer.  Message was left for patient to call back if they had any questions of concerns.     Ligia Salgado PA-C

## 2021-05-24 ENCOUNTER — OFFICE VISIT (OUTPATIENT)
Dept: SURGERY | Facility: CLINIC | Age: 61
End: 2021-05-24
Payer: COMMERCIAL

## 2021-05-24 VITALS
HEART RATE: 70 BPM | SYSTOLIC BLOOD PRESSURE: 106 MMHG | DIASTOLIC BLOOD PRESSURE: 70 MMHG | RESPIRATION RATE: 16 BRPM | BODY MASS INDEX: 28.49 KG/M2 | OXYGEN SATURATION: 99 % | WEIGHT: 199 LBS | HEIGHT: 70 IN

## 2021-05-24 DIAGNOSIS — Z98.890 POSTOPERATIVE STATE: Primary | ICD-10-CM

## 2021-05-24 DIAGNOSIS — K43.2 INCISIONAL HERNIA, WITHOUT OBSTRUCTION OR GANGRENE: ICD-10-CM

## 2021-05-24 PROCEDURE — 99024 POSTOP FOLLOW-UP VISIT: CPT | Performed by: SURGERY

## 2021-05-24 ASSESSMENT — MIFFLIN-ST. JEOR: SCORE: 1713.91

## 2021-05-24 NOTE — LETTER
May 24, 2021    RE: Jeramie Sánchez, : 1960      Returns in follow-up after repair of a left inguinal hernia and umbilical/central abdominal hernia.  He reports he is doing exceptionally well from the standpoint of his hernia repairs.  He has had minimal pain and is quite pleased with his recovery.  He has however now developed an additional hernia cephalad to his umbilicus at the site of one of his da Yuriy prostatectomy incisions.  This was not present at the time of his central abdominal/umbilical and inguinal hernia repair.  I suspect that this is a consequence of his prior central abdominal hernia repair producing additional stress on the region of his old laparoscopy incision and causing his fascia to fail and then developing hernia.  Currently, this is completely asymptomatic for him.     Exam: Both incisions are healing nicely, there is no sign of infection seroma hematoma or other complication.     Impression: Excellent postop recovery, the development of a new hernia cephalad to his previous repair, likely a consequence of increased tension following his recent central abdominal hernia repair.     Plan: I discussed herniorrhaphy again with him, he has an excellent understanding of it, having just recently gone through hernia repair.  Currently, since he has minimal symptoms, he would like to hold off on an additional hernia repair right now.  He will contact us when he is ready to go ahead with surgery.       Sourav Simon MD

## 2021-05-24 NOTE — PROGRESS NOTES
Returns in follow-up after repair of a left inguinal hernia and umbilical/central abdominal hernia.  He reports he is doing exceptionally well from the standpoint of his hernia repairs.  He has had minimal pain and is quite pleased with his recovery.  He has however now developed an additional hernia cephalad to his umbilicus at the site of one of his da Yuriy prostatectomy incisions.  This was not present at the time of his central abdominal/umbilical and inguinal hernia repair.  I suspect that this is a consequence of his prior central abdominal hernia repair producing additional stress on the region of his old laparoscopy incision and causing his fascia to fail and then developing hernia.  Currently, this is completely asymptomatic for him.    Exam: Both incisions are healing nicely, there is no sign of infection seroma hematoma or other complication.    Impression: Excellent postop recovery, the development of a new hernia cephalad to his previous repair, likely a consequence of increased tension following his recent central abdominal hernia repair.    Plan: I discussed herniorrhaphy again with him, he has an excellent understanding of it, having just recently gone through hernia repair.  Currently, since he has minimal symptoms, he would like to hold off on an additional hernia repair right now.  He will contact us when he is ready to go ahead with surgery.    Sourav Simon MD  5/24/2021 1:46 PM    Please route or send letter to:  Primary Care Provider (PCP) and Include Progress Note

## 2021-06-15 ENCOUNTER — TELEPHONE (OUTPATIENT)
Dept: VASCULAR SURGERY | Facility: CLINIC | Age: 61
End: 2021-06-15

## 2021-06-15 DIAGNOSIS — I83.812 VARICOSE VEINS OF LEFT LOWER EXTREMITY WITH PAIN: Primary | ICD-10-CM

## 2021-06-15 DIAGNOSIS — I83.813 VARICOSE VEINS OF BOTH LOWER EXTREMITIES WITH PAIN: ICD-10-CM

## 2021-06-15 NOTE — TELEPHONE ENCOUNTER
Pt seen Dr. Zamudio on 8/5/19 after finished conserv tx. Submitted to his insurance company,approved until 4/1/20. Dr. Zamudio retired and Dr. Shipley took over this patients care. Pt cancelled his procedure due to Covid-19.    Pt called to proceed with his procedure.    [facility] to inform pt he will need to get an updated U/S (last U/S done 4/29/19) and a re evaluation with Dr. Shipley.     Lisa White, Surgery Scheduler  [facility]

## 2021-06-23 NOTE — TELEPHONE ENCOUNTER
Spoke with patient and explained he will need an updated bilateral leg U/S and a visit with Dr. Shipley.    Pt understands and agrees. Pt will call back in Aug to get this scheduled.    Order placed.    Lisa White, Surgery Scheduler  [facility]

## 2021-06-25 NOTE — PROGRESS NOTES
Pt woke up at 11 pm last night  abx ended recently for strep Tuesday  100.4 temp under arm today in the night, 1200 am and 101.4 with the added degree  Seeing things that went there last night. Seeing bubbles on the bunny and something about how the skin was on the bunny (?)  Went to moms bed and went to the bathroom and was talking about bubbles in her room (?)  Finally Fell asleep 330-4 am  She remembers seeing these things last night  Pt was crying to Ita or Vanessa that is her  provider and was upset that she didn't get her monkey-saying ita wouldn't give her the monkey  Drenched in sweat between 3-530 am today  Tylenol when fever at midnight  drinking well  Threw bunny last night and said it was covered in pee and needed to be washed and it was not covered in pee and pt was screaming and went back to her bed and than to moms bed etc  Went on for 3 hours  Not sure if typical of night terror as she remembered the event today    appt today with PCP  Chrissie Nelson, RN [facility] RN Triage        Reason for Disposition    Delirium resolved but lasted > 30 minutes    Protocols used: CONFUSION - DELIRIUM-P-OH       Urology progress note    S:  MARIAELENA  Doing well this AM. Good pain ctrl  No N/V, no appetite  NPO overnight, will try CLD this AM  Ambulated to chair    O:  AVSS, weaning off O2 per NC  NAD  Abdomen S/NT/ND, incisions CDI   GORDON SS  Prabhakar with clear urine    Good UOP overnight   GORDON output 160 overnight     Labs unremarkable  Heme:  Recent Labs   Lab 12/17/20  0559 12/16/20  1728   WBC 7.9 8.4   HGB 11.5* 12.2*    182     Chem:  Recent Labs   Lab 12/17/20  0559 12/16/20  1728   POTASSIUM 4.1 4.3   CR 1.20 1.16       A/P:  60M with no sig PMH who is now POD#1 s/p RALP with PLND. Doing well     - ADAT  - Stop IVF  - Ambulate TID  - Continue SQH ppx during this admission  - Check GORDON Cr, remove if consistent with serum cr  - Plan on d/c later today if pain remains well controlled, with no issues ambulating and tolerating diet    Will discuss with Dr. Bruce Huang,   Urology Resident

## 2021-08-19 ENCOUNTER — HOSPITAL ENCOUNTER (OUTPATIENT)
Facility: CLINIC | Age: 61
End: 2021-08-19
Attending: SURGERY | Admitting: SURGERY
Payer: COMMERCIAL

## 2021-08-19 DIAGNOSIS — K43.2 INCISIONAL HERNIA, WITHOUT OBSTRUCTION OR GANGRENE: ICD-10-CM

## 2021-08-26 ENCOUNTER — TELEPHONE (OUTPATIENT)
Dept: SURGERY | Facility: CLINIC | Age: 61
End: 2021-08-26

## 2021-08-26 NOTE — TELEPHONE ENCOUNTER
Type of surgery: OPEN INCISIONAL HERNIA REPAIR WITH MESH   Location of surgery: [facility]  Date and time of surgery: 11-18-21, 7:30 AM   Surgeon: DR. CHAPA   Pre-Op Appt Date: PATIENT TO SCHEDULE   Post-Op Appt Date: PATIENT TO SCHEDULE    Packet sent out: Yes  Pre-cert/Authorization completed:  Not Applicable  Date: 8-26-27      OPEN INCISIONAL HERNIA REPAIR WITH MESH   GENERAL   PT INST TO HAVE H&P WITH DR. QUINTANILLA  90 MINS REQ   PA ASSIST HUSSEIN AGUILARW

## 2021-08-26 NOTE — LETTER
[facility]    [address], [address]  [place], [place] [address]  Phone [phone]  Fax [phone] [address], [address]  [facility] [place] [address]  Phone [phone]  Fax [phone]    [email or web address]   August 26, 2021    Jeramie Sánchez  [address] [facility] [address]    We realize with scheduling surgery, one of your first questions is, how much will this cost?  Below we have provided you with the information you will need to receive an estimate for your surgery.    You are scheduled for the following procedure:  Open incisional hernia repair with mesh      Surgeon:  Dr. Simon     Physician Assistant:  Yes      Please make sure to have your insurance card available at the time of calling.    Surgeon & Physician Assistant charges and facility charges for [facility], [facility] or [facility]:    Consumer Price Line at [phone]   -  It is important to note that there may be a Physician Assistant assisting with your surgery.  Please be sure to mention this when calling for the estimate.      If you prefer, you can also request a price estimate online by completing the secure form at:  [email or web address]    Facility Charges at [facility] [place], [facility] [place] or [facility]:  [facility] at [phone]  [facility] at [phone]  [facility] at [phone] or [phone]    Anesthesiologist Charges:  [facility] at [phone]    [facility] - Nurse Anesthetist Charges:  [facility] at [phone]

## 2021-10-04 ENCOUNTER — TELEPHONE (OUTPATIENT)
Dept: SLEEP MEDICINE | Facility: CLINIC | Age: 61
End: 2021-10-04

## 2021-10-04 NOTE — TELEPHONE ENCOUNTER
Reason for call:  Order   Order or referral being requested: CPAP supplies  Reason for request: Needs new supplies  Date needed: as soon as possible  Has the patient been seen by the PCP for this problem? Not Applicable    Additional comments: Patient was told by [facility] in [facility] that an updated prescription order is needed first.    Phone number to reach patient:  Cell number on file:    Telephone Information:   Mobile [phone]       Best Time:  Anytime    Can we leave a detailed message on this number?  YES    Travel screening: Not Applicable

## 2021-11-15 ENCOUNTER — OFFICE VISIT (OUTPATIENT)
Dept: INTERNAL MEDICINE | Facility: CLINIC | Age: 61
End: 2021-11-15
Payer: COMMERCIAL

## 2021-11-15 VITALS
SYSTOLIC BLOOD PRESSURE: 130 MMHG | WEIGHT: 201 LBS | HEIGHT: 70 IN | OXYGEN SATURATION: 99 % | TEMPERATURE: 98.1 F | BODY MASS INDEX: 28.77 KG/M2 | DIASTOLIC BLOOD PRESSURE: 77 MMHG | HEART RATE: 81 BPM

## 2021-11-15 DIAGNOSIS — K43.2 INCISIONAL HERNIA, WITHOUT OBSTRUCTION OR GANGRENE: ICD-10-CM

## 2021-11-15 DIAGNOSIS — I10 ESSENTIAL HYPERTENSION: ICD-10-CM

## 2021-11-15 DIAGNOSIS — Z23 NEED FOR PROPHYLACTIC VACCINATION AND INOCULATION AGAINST INFLUENZA: ICD-10-CM

## 2021-11-15 DIAGNOSIS — Z01.818 PREOP GENERAL PHYSICAL EXAM: Primary | ICD-10-CM

## 2021-11-15 DIAGNOSIS — Z11.52 ENCOUNTER FOR SCREENING LABORATORY TESTING FOR COVID-19 VIRUS: ICD-10-CM

## 2021-11-15 DIAGNOSIS — K40.90 RIGHT INGUINAL HERNIA: ICD-10-CM

## 2021-11-15 LAB
ERYTHROCYTE [DISTWIDTH] IN BLOOD BY AUTOMATED COUNT: 13.4 % (ref 10–15)
HCT VFR BLD AUTO: 38.6 % (ref 40–53)
HGB BLD-MCNC: 12.4 G/DL (ref 13.3–17.7)
MCH RBC QN AUTO: 32.2 PG (ref 26.5–33)
MCHC RBC AUTO-ENTMCNC: 32.1 G/DL (ref 31.5–36.5)
MCV RBC AUTO: 100 FL (ref 78–100)
PLATELET # BLD AUTO: 160 10E3/UL (ref 150–450)
RBC # BLD AUTO: 3.85 10E6/UL (ref 4.4–5.9)
WBC # BLD AUTO: 4.3 10E3/UL (ref 4–11)

## 2021-11-15 PROCEDURE — 80048 BASIC METABOLIC PNL TOTAL CA: CPT | Performed by: INTERNAL MEDICINE

## 2021-11-15 PROCEDURE — 36415 COLL VENOUS BLD VENIPUNCTURE: CPT | Performed by: INTERNAL MEDICINE

## 2021-11-15 PROCEDURE — 90682 RIV4 VACC RECOMBINANT DNA IM: CPT | Performed by: INTERNAL MEDICINE

## 2021-11-15 PROCEDURE — 85027 COMPLETE CBC AUTOMATED: CPT | Performed by: INTERNAL MEDICINE

## 2021-11-15 PROCEDURE — U0003 INFECTIOUS AGENT DETECTION BY NUCLEIC ACID (DNA OR RNA); SEVERE ACUTE RESPIRATORY SYNDROME CORONAVIRUS 2 (SARS-COV-2) (CORONAVIRUS DISEASE [COVID-19]), AMPLIFIED PROBE TECHNIQUE, MAKING USE OF HIGH THROUGHPUT TECHNOLOGIES AS DESCRIBED BY CMS-2020-01-R: HCPCS | Performed by: INTERNAL MEDICINE

## 2021-11-15 PROCEDURE — U0005 INFEC AGEN DETEC AMPLI PROBE: HCPCS | Performed by: INTERNAL MEDICINE

## 2021-11-15 PROCEDURE — 99214 OFFICE O/P EST MOD 30 MIN: CPT | Mod: 25 | Performed by: INTERNAL MEDICINE

## 2021-11-15 PROCEDURE — 93000 ELECTROCARDIOGRAM COMPLETE: CPT | Performed by: INTERNAL MEDICINE

## 2021-11-15 PROCEDURE — 90471 IMMUNIZATION ADMIN: CPT | Performed by: INTERNAL MEDICINE

## 2021-11-15 ASSESSMENT — MIFFLIN-ST. JEOR: SCORE: 1717.98

## 2021-11-15 NOTE — PROGRESS NOTES
[place] [address]  [facility] [address]  Phone: [phone]  Primary Provider: Epifanio Espinoza  Pre-op Performing Provider: EPIFANIO ESPINOZA      PREOPERATIVE EVALUATION:  Today's date: 11/15/2021    Jeramie Sánchez is a 61 year old male who presents for a preoperative evaluation.    Surgical Information:  Surgery/Procedure: Laparoscopic Incisional Hernia repair  Surgery Location: [facility]/ [place]  Surgeon: Dr. Tarango  Surgery Date: 11/18/21  Time of Surgery: 7:30  Where patient plans to recover: At home with family   Fax number for surgical facility: [phone]        Type of Anesthesia Anticipated: General    Assessment & Plan     The proposed surgical procedure is considered INTERMEDIATE risk.    Problem List Items Addressed This Visit     Essential hypertension    Incisional hernia, without obstruction or gangrene      Other Visit Diagnoses     Preop general physical exam    -  Primary    Relevant Orders    EKG 12-lead complete w/read - Clinics (Completed)    CBC with platelets    Basic metabolic panel  (Ca, Cl, CO2, Creat, Gluc, K, Na, BUN)    Right inguinal hernia                   Risks and Recommendations:  The patient has the following additional risks and recommendations for perioperative complications:   - No identified additional risk factors other than previously addressed    Medication Instructions:  hold BP medication on day of surgery     RECOMMENDATION:  APPROVAL GIVEN to proceed with proposed procedure, without further diagnostic evaluation.    Review of external notes as documented above         Subjective     HPI related to upcoming procedure: patient is scheduled for incisional hernia post prior umbilical hernia repair and for right inguinal hernia surgery.   No acute complaints, no medication change or new medical conditions.  Has h/o HTN. on medical treatment. BP has been controlled. No side effects from  medications. No CP, HA, dizziness. good compliance with medications and low salt diet.      Preop Questions 11/15/2021   1. Have you ever had a heart attack or stroke? No   2. Have you ever had surgery on your heart or blood vessels, such as a stent placement, a coronary artery bypass, or surgery on an artery in your head, neck, heart, or legs? No   3. Do you have chest pain with activity? No   4. Do you have a history of  heart failure? No   5. Do you currently have a cold, bronchitis or symptoms of other infection? No   6. Do you have a cough, shortness of breath, or wheezing? No   7. Do you or anyone in your family have previous history of blood clots? No   8. Do you or does anyone in your family have a serious bleeding problem such as prolonged bleeding following surgeries or cuts? No   9. Have you ever had problems with anemia or been told to take iron pills? No   10. Have you had any abnormal blood loss such as black, tarry or bloody stools? No   11. Have you ever had a blood transfusion? No   12. Are you willing to have a blood transfusion if it is medically needed before, during, or after your surgery? Yes   13. Have you or any of your relatives ever had problems with anesthesia? No   14. Do you have sleep apnea, excessive snoring or daytime drowsiness? YES -    14a. Do you have a CPAP machine? Yes   15. Do you have any artifical heart valves or other implanted medical devices like a pacemaker, defibrillator, or continuous glucose monitor? No   16. Do you have artificial joints? YES -    17. Are you allergic to latex? No       Health Care Directive:  Patient does not have a Health Care Directive or Living Will: Patient states has Advance Directive and will bring in a copy to clinic.    Preoperative Review of :   reviewed - no record of controlled substances prescribed.      Status of Chronic Conditions:  See problem list for active medical problems.  Problems all longstanding and stable, except as  noted/documented.  See ROS for pertinent symptoms related to these conditions.      Review of Systems  CONSTITUTIONAL: NEGATIVE for fever, chills, change in weight  INTEGUMENTARY/SKIN: NEGATIVE for worrisome rashes, moles or lesions  EYES: NEGATIVE for vision changes or irritation  ENT/MOUTH: NEGATIVE for ear, mouth and throat problems  RESP: NEGATIVE for significant cough or SOB  CV: NEGATIVE for chest pain, palpitations or peripheral edema  GI: NEGATIVE for nausea, abdominal pain, heartburn, or change in bowel habits  : NEGATIVE for frequency, dysuria, or hematuria  MUSCULOSKELETAL: NEGATIVE for significant arthralgias or myalgia  NEURO: NEGATIVE for weakness, dizziness or paresthesias  ENDOCRINE: NEGATIVE for temperature intolerance, skin/hair changes  HEME: NEGATIVE for bleeding problems  PSYCHIATRIC: NEGATIVE for changes in mood or affect    Patient Active Problem List    Diagnosis Date Noted     Incisional hernia, without obstruction or gangrene 08/19/2021     Priority: Medium     Added automatically from request for surgery [id number]       Left inguinal hernia 04/05/2021     Priority: Medium     Added automatically from request for surgery [id number]       Ventral hernia 04/05/2021     Priority: Medium     Added automatically from request for surgery [id number]       Prostate cancer (H) 08/13/2020     Priority: Medium     Added automatically from request for surgery [id number]       JASSON (obstructive sleep apnea) 09/18/2018     Priority: Medium     HST, 9/17/2018, AHI: 57        Essential hypertension 01/05/2016     Priority: Medium     Hip pain 11/25/2014     Priority: Medium      Past Medical History:   Diagnosis Date     Arthritis      Hypertension      JASSON (obstructive sleep apnea)      Prostate cancer (H)      Sleep apnea     uses CPAP     Past Surgical History:   Procedure Laterality Date     ARTHROPLASTY HIP ANTERIOR Left 11/25/2014    Procedure: ARTHROPLASTY HIP ANTERIOR;  Surgeon: Drew Phelps MD;   "Location: [facility] OR     DAVINCI PROSTATECTOMY, LYMPHADENECTOMY Bilateral 12/16/2020    Procedure: PROSTATECTOMY, ROBOT-ASSISTED, WITH PELVIC LYMPHADENECTOMY, POSSIBLE OPEN;  Surgeon: Power Barrera MD;  Location: [facility] OR     ENT SURGERY      jaw repair for dental reasons     HERNIORRHAPHY INGUINAL Left 4/29/2021    Procedure: OPEN LEFT INGUINAL HERNIA REPAIR WITH MESH;  Surgeon: Sourav Simon MD;  Location: [facility] OR     HERNIORRHAPHY VENTRAL N/A 4/29/2021    Procedure: OPEN VENTRAL HERNIA REPAIR WITH MESH;  Surgeon: Sourav Simon MD;  Location: [facility] OR     PROSTATE SURGERY       Current Outpatient Medications   Medication Sig Dispense Refill     lisinopril-hydrochlorothiazide (ZESTORETIC) 20-25 MG tablet Take 1 tablet by mouth daily 90 tablet 3     multivitamin, therapeutic with minerals (MULTI-VITAMIN) TABS Take 1 tablet by mouth daily         No Known Allergies     Social History     Tobacco Use     Smoking status: Never Smoker     Smokeless tobacco: Never Used   Substance Use Topics     Alcohol use: Not Currently     Family History   Problem Relation Age of Onset     Other Cancer Mother      Heart Surgery Father      Diabetes No family hx of      Colon Cancer No family hx of      History   Drug Use No         Objective     /77 (BP Location: Left arm, Patient Position: Sitting, Cuff Size: Adult Large)   Pulse 81   Temp 98.1  F (36.7  C) (Oral)   Ht 1.778 m (5' 10\")   Wt 91.2 kg (201 lb)   SpO2 99%   BMI 28.84 kg/m      Physical Exam    GENERAL APPEARANCE: healthy, alert and no distress     EYES: EOMI,  PERRL     HENT: ear canals and TM's normal and nose and mouth without ulcers or lesions     NECK: no adenopathy, no asymmetry, masses, or scars and thyroid normal to palpation     RESP: lungs clear to auscultation - no rales, rhonchi or wheezes     CV: regular rates and rhythm, normal S1 S2, no S3 or S4 and no murmur, click or rub     ABDOMEN:  soft, nontender, no HSM or masses and bowel sounds " normal, mid abdominal hernia at the scar above the umbilicus from previus hernia surgery, small right inguinal hernia, both reducible, not painful      MS: extremities normal- no gross deformities noted, no evidence of inflammation in joints, FROM in all extremities.     SKIN: no suspicious lesions or rashes     NEURO: Normal strength and tone, sensory exam grossly normal, mentation intact and speech normal     PSYCH: mentation appears normal. and affect normal/bright     LYMPHATICS: No cervical adenopathy    Recent Labs   Lab Test 04/19/21  1421 12/17/20  0559 12/16/20  1728   HGB 13.2* 11.5* 12.2*   PLT  --  184 182    138 139   POTASSIUM 3.6 4.1 4.3   CR 1.21 1.20 1.16        Diagnostics:  Labs pending at this time.  Results will be reviewed when available.   EKG: appears normal, NSR, normal axis, normal intervals, no acute ST/T changes c/w ischemia, no LVH by voltage criteria, unchanged from previous tracings    Revised Cardiac Risk Index (RCRI):  The patient has the following serious cardiovascular risks for perioperative complications:   - No serious cardiac risks = 0 points     RCRI Interpretation: 0 points: Class I (very low risk - 0.4% complication rate)           Signed Electronically by: Maninder Espinoza MD  Copy of this evaluation report is provided to requesting physician.

## 2021-11-15 NOTE — LETTER
November 17, 2021      Jeramie Sánchez  [address] [facility] [address]        Dear ,    We are writing to inform you of your test results.    Your test results fall within the expected range(s) or remain unchanged from previous results.  Please continue with current treatment plan.    Resulted Orders   CBC with platelets   Result Value Ref Range    WBC Count 4.3 4.0 - 11.0 10e3/uL    RBC Count 3.85 (L) 4.40 - 5.90 10e6/uL    Hemoglobin 12.4 (L) 13.3 - 17.7 g/dL    Hematocrit 38.6 (L) 40.0 - 53.0 %     78 - 100 fL    MCH 32.2 26.5 - 33.0 pg    MCHC 32.1 31.5 - 36.5 g/dL    RDW 13.4 10.0 - 15.0 %    Platelet Count 160 150 - 450 10e3/uL   Basic metabolic panel  (Ca, Cl, CO2, Creat, Gluc, K, Na, BUN)   Result Value Ref Range    Sodium 138 133 - 144 mmol/L    Potassium 4.5 3.4 - 5.3 mmol/L    Chloride 104 94 - 109 mmol/L    Carbon Dioxide (CO2) 34 (H) 20 - 32 mmol/L    Anion Gap <1 (L) 3 - 14 mmol/L    Urea Nitrogen 19 7 - 30 mg/dL    Creatinine 1.20 0.66 - 1.25 mg/dL    Calcium 9.1 8.5 - 10.1 mg/dL    Glucose 113 (H) 70 - 99 mg/dL    GFR Estimate 64 >60 mL/min/1.73m2      Comment:      As of July 11, 2021, eGFR is calculated by the CKD-EPI creatinine equation, without race adjustment. eGFR can be influenced by muscle mass, exercise, and diet. The reported eGFR is an estimation only and is only applicable if the renal function is stable.   Asymptomatic COVID-19 Virus (Coronavirus) by PCR Nose   Result Value Ref Range    SARS CoV2 PCR Negative Negative, Testing sent to reference lab. Results will be returned via unsolicited result      Comment:      NEGATIVE: SARS-CoV-2 (COVID-19) RNA not detected, presumed negative.    Narrative    Testing was performed using the Aptima SARS-CoV-2 Assay on the  [employer] Instrument System. Additional information about this  Emergency Use Authorization (EUA) assay can be found via the Lab  Guide. This test should be ordered for the detection of SARS-CoV-2  in  individuals who meet SARS-CoV-2 clinical and/or epidemiological  criteria. Test performance is unknown in asymptomatic patients. This  test is for in vitro diagnostic use under the FDA EUA for  laboratories certified under CLIA to perform high complexity testing.  This test has not been FDA cleared or approved. A negative result  does not rule out the presence of PCR inhibitors in the specimen or  target RNA in concentration below the limit of detection for the  assay. The possibility of a false negative should be considered if  the patient's recent exposure or clinical presentation suggests  COVID-19. This test was validated by the [facility]. This laboratory is certified under  the Clinical Laboratory Improvement Amendments of 1988 (CLIA-88) as  qualified to perform high complexity laboratory testing.       If you have any questions or concerns, please call the clinic at the number listed above.       Sincerely,      Maninder Espinoza MD

## 2021-11-16 LAB
ANION GAP SERPL CALCULATED.3IONS-SCNC: <1 MMOL/L (ref 3–14)
BUN SERPL-MCNC: 19 MG/DL (ref 7–30)
CALCIUM SERPL-MCNC: 9.1 MG/DL (ref 8.5–10.1)
CHLORIDE BLD-SCNC: 104 MMOL/L (ref 94–109)
CO2 SERPL-SCNC: 34 MMOL/L (ref 20–32)
CREAT SERPL-MCNC: 1.2 MG/DL (ref 0.66–1.25)
GFR SERPL CREATININE-BSD FRML MDRD: 64 ML/MIN/1.73M2
GLUCOSE BLD-MCNC: 113 MG/DL (ref 70–99)
POTASSIUM BLD-SCNC: 4.5 MMOL/L (ref 3.4–5.3)
SARS-COV-2 RNA RESP QL NAA+PROBE: NEGATIVE
SODIUM SERPL-SCNC: 138 MMOL/L (ref 133–144)

## 2021-12-15 ENCOUNTER — TELEPHONE (OUTPATIENT)
Dept: SLEEP MEDICINE | Facility: CLINIC | Age: 61
End: 2021-12-15
Payer: COMMERCIAL

## 2021-12-15 NOTE — TELEPHONE ENCOUNTER
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   Additional comments: Please call patient with any questions, email or [employer]     Phone number to reach patient:  Home number on file [phone] (home)    Best Time:  any    Can we leave a detailed message on this number?  YES    Travel screening: Not Applicable

## 2021-12-16 NOTE — TELEPHONE ENCOUNTER
Attempted to reach patient via phone unsuccessful left message to call back to discuss download options.         ALFRED Bowers  [facility]

## 2021-12-20 NOTE — TELEPHONE ENCOUNTER
Patient called back requesting a 30 days compliance report to be e-mail to him at [email or web address].      30 days compliance report send via e-mail to [email or web address] on 12/20/2021 10:51 AM.          ALFRED Bowers  [facility] [place] [facility]

## 2022-01-02 ENCOUNTER — HEALTH MAINTENANCE LETTER (OUTPATIENT)
Age: 62
End: 2022-01-02

## 2022-05-20 ENCOUNTER — OFFICE VISIT (OUTPATIENT)
Dept: INTERNAL MEDICINE | Facility: CLINIC | Age: 62
End: 2022-05-20
Payer: COMMERCIAL

## 2022-05-20 VITALS
BODY MASS INDEX: 29.71 KG/M2 | RESPIRATION RATE: 16 BRPM | HEIGHT: 70 IN | OXYGEN SATURATION: 99 % | TEMPERATURE: 97.6 F | HEART RATE: 61 BPM | DIASTOLIC BLOOD PRESSURE: 82 MMHG | WEIGHT: 207.5 LBS | SYSTOLIC BLOOD PRESSURE: 120 MMHG

## 2022-05-20 DIAGNOSIS — Z11.59 NEED FOR HEPATITIS C SCREENING TEST: ICD-10-CM

## 2022-05-20 DIAGNOSIS — Z12.5 SCREENING FOR PROSTATE CANCER: ICD-10-CM

## 2022-05-20 DIAGNOSIS — Z11.4 SCREENING FOR HIV (HUMAN IMMUNODEFICIENCY VIRUS): ICD-10-CM

## 2022-05-20 DIAGNOSIS — Z00.00 ENCOUNTER FOR PREVENTATIVE ADULT HEALTH CARE EXAMINATION: Primary | ICD-10-CM

## 2022-05-20 DIAGNOSIS — I10 BENIGN ESSENTIAL HYPERTENSION: ICD-10-CM

## 2022-05-20 LAB
ALBUMIN UR-MCNC: NEGATIVE MG/DL
APPEARANCE UR: CLEAR
BILIRUB UR QL STRIP: NEGATIVE
COLOR UR AUTO: YELLOW
ERYTHROCYTE [DISTWIDTH] IN BLOOD BY AUTOMATED COUNT: 13 % (ref 10–15)
GLUCOSE UR STRIP-MCNC: NEGATIVE MG/DL
HCT VFR BLD AUTO: 41.4 % (ref 40–53)
HGB BLD-MCNC: 13.7 G/DL (ref 13.3–17.7)
HGB UR QL STRIP: NEGATIVE
KETONES UR STRIP-MCNC: NEGATIVE MG/DL
LEUKOCYTE ESTERASE UR QL STRIP: NEGATIVE
MCH RBC QN AUTO: 32.2 PG (ref 26.5–33)
MCHC RBC AUTO-ENTMCNC: 33.1 G/DL (ref 31.5–36.5)
MCV RBC AUTO: 97 FL (ref 78–100)
NITRATE UR QL: NEGATIVE
PH UR STRIP: 6.5 [PH] (ref 5–7)
PLATELET # BLD AUTO: 206 10E3/UL (ref 150–450)
RBC # BLD AUTO: 4.26 10E6/UL (ref 4.4–5.9)
RBC #/AREA URNS AUTO: NORMAL /HPF
SP GR UR STRIP: 1.02 (ref 1–1.03)
UROBILINOGEN UR STRIP-ACNC: 0.2 E.U./DL
WBC # BLD AUTO: 5.2 10E3/UL (ref 4–11)
WBC #/AREA URNS AUTO: NORMAL /HPF

## 2022-05-20 PROCEDURE — G0103 PSA SCREENING: HCPCS | Performed by: INTERNAL MEDICINE

## 2022-05-20 PROCEDURE — 99396 PREV VISIT EST AGE 40-64: CPT | Performed by: INTERNAL MEDICINE

## 2022-05-20 PROCEDURE — 85027 COMPLETE CBC AUTOMATED: CPT | Performed by: INTERNAL MEDICINE

## 2022-05-20 PROCEDURE — 36415 COLL VENOUS BLD VENIPUNCTURE: CPT | Performed by: INTERNAL MEDICINE

## 2022-05-20 PROCEDURE — 80061 LIPID PANEL: CPT | Performed by: INTERNAL MEDICINE

## 2022-05-20 PROCEDURE — 80053 COMPREHEN METABOLIC PANEL: CPT | Performed by: INTERNAL MEDICINE

## 2022-05-20 PROCEDURE — 87389 HIV-1 AG W/HIV-1&-2 AB AG IA: CPT | Performed by: INTERNAL MEDICINE

## 2022-05-20 PROCEDURE — 81001 URINALYSIS AUTO W/SCOPE: CPT | Performed by: INTERNAL MEDICINE

## 2022-05-20 PROCEDURE — 86803 HEPATITIS C AB TEST: CPT | Performed by: INTERNAL MEDICINE

## 2022-05-20 PROCEDURE — 84443 ASSAY THYROID STIM HORMONE: CPT | Performed by: INTERNAL MEDICINE

## 2022-05-20 RX ORDER — LISINOPRIL AND HYDROCHLOROTHIAZIDE 20; 25 MG/1; MG/1
1 TABLET ORAL DAILY
Qty: 90 TABLET | Refills: 3 | Status: SHIPPED | OUTPATIENT
Start: 2022-05-20 | End: 2022-05-24

## 2022-05-20 ASSESSMENT — ENCOUNTER SYMPTOMS
NAUSEA: 0
FREQUENCY: 0
DIZZINESS: 0
HEMATOCHEZIA: 0
DIARRHEA: 0
SHORTNESS OF BREATH: 0
MYALGIAS: 0
ARTHRALGIAS: 0
SORE THROAT: 0
FEVER: 0
CONSTIPATION: 0
WEAKNESS: 0
HEARTBURN: 0
HEMATURIA: 0
EYE PAIN: 0
CHILLS: 0
NERVOUS/ANXIOUS: 0
PARESTHESIAS: 0
JOINT SWELLING: 0
PALPITATIONS: 0
HEADACHES: 0
COUGH: 0
ABDOMINAL PAIN: 0
DYSURIA: 0

## 2022-05-20 NOTE — LETTER
May 24, 2022      Juliocesar Sánchez  [address] [facility] [address]        Dear ,    We are writing to inform you of your test results.    Normal result reviewed.   Borderline elevated glucose. Recommend to keep low fat diet and exercise.     Resulted Orders   HIV Antigen Antibody Combo   Result Value Ref Range    HIV Antigen Antibody Combo Nonreactive Nonreactive      Comment:      HIV-1 p24 Ag & HIV-1/HIV-2 Ab Not Detected   Hepatitis C Screen Reflex to HCV RNA Quant and Genotype   Result Value Ref Range    Hepatitis C Antibody Nonreactive Nonreactive    Narrative    Assay performance characteristics have not been established for newborns, infants, and children.   Lipid panel reflex to direct LDL Fasting   Result Value Ref Range    Cholesterol 185 <200 mg/dL    Triglycerides 112 <150 mg/dL    Direct Measure HDL 50 >=40 mg/dL    LDL Cholesterol Calculated 113 (H) <=100 mg/dL    Non HDL Cholesterol 135 (H) <130 mg/dL    Patient Fasting > 8hrs? Yes     Narrative    Cholesterol  Desirable:  <200 mg/dL    Triglycerides  Normal:  Less than 150 mg/dL  Borderline High:  150-199 mg/dL  High:  200-499 mg/dL  Very High:  Greater than or equal to 500 mg/dL    Direct Measure HDL  Female:  Greater than or equal to 50 mg/dL   Male:  Greater than or equal to 40 mg/dL    LDL Cholesterol  Desirable:  <100mg/dL  Above Desirable:  100-129 mg/dL   Borderline High:  130-159 mg/dL   High:  160-189 mg/dL   Very High:  >= 190 mg/dL    Non HDL Cholesterol  Desirable:  130 mg/dL  Above Desirable:  130-159 mg/dL  Borderline High:  160-189 mg/dL  High:  190-219 mg/dL  Very High:  Greater than or equal to 220 mg/dL   CBC with platelets   Result Value Ref Range    WBC Count 5.2 4.0 - 11.0 10e3/uL    RBC Count 4.26 (L) 4.40 - 5.90 10e6/uL    Hemoglobin 13.7 13.3 - 17.7 g/dL    Hematocrit 41.4 40.0 - 53.0 %    MCV 97 78 - 100 fL    MCH 32.2 26.5 - 33.0 pg    MCHC 33.1 31.5 - 36.5 g/dL    RDW 13.0 10.0 - 15.0 %    Platelet Count 206  150 - 450 10e3/uL   Comprehensive metabolic panel (BMP + Alb, Alk Phos, ALT, AST, Total. Bili, TP)   Result Value Ref Range    Sodium 139 133 - 144 mmol/L    Potassium 4.7 3.4 - 5.3 mmol/L    Chloride 105 94 - 109 mmol/L    Carbon Dioxide (CO2) 32 20 - 32 mmol/L    Anion Gap 2 (L) 3 - 14 mmol/L    Urea Nitrogen 16 7 - 30 mg/dL    Creatinine 1.13 0.66 - 1.25 mg/dL    Calcium 10.0 8.5 - 10.1 mg/dL    Glucose 102 (H) 70 - 99 mg/dL    Alkaline Phosphatase 76 40 - 150 U/L    AST 17 0 - 45 U/L    ALT 34 0 - 70 U/L    Protein Total 7.7 6.8 - 8.8 g/dL    Albumin 4.2 3.4 - 5.0 g/dL    Bilirubin Total 0.5 0.2 - 1.3 mg/dL    GFR Estimate 73 >60 mL/min/1.73m2      Comment:      Effective December 21, 2021 eGFRcr in adults is calculated using the 2021 CKD-EPI creatinine equation which includes age and gender (Nicholas ge al., NEJ, DOI: 10.1056/PAWXal9704594)   PSA, screen   Result Value Ref Range    Prostate Specific Antigen Screen 0.06 0.00 - 4.00 ug/L    Narrative    Assay Method:  Chemiluminescence using Siemens   Vista analyzer.   TSH with free T4 reflex   Result Value Ref Range    TSH 0.61 0.40 - 4.00 mU/L   UA with Microscopic reflex to Culture - lab collect   Result Value Ref Range    Color Urine Yellow Colorless, Straw, Light Yellow, Yellow    Appearance Urine Clear Clear    Glucose Urine Negative Negative mg/dL    Bilirubin Urine Negative Negative    Ketones Urine Negative Negative mg/dL    Specific Gravity Urine 1.020 1.003 - 1.035    Blood Urine Negative Negative    pH Urine 6.5 5.0 - 7.0    Protein Albumin Urine Negative Negative mg/dL    Urobilinogen Urine 0.2 0.2, 1.0 E.U./dL    Nitrite Urine Negative Negative    Leukocyte Esterase Urine Negative Negative   Urine Microscopic   Result Value Ref Range    RBC Urine 0-2 0-2 /HPF /HPF    WBC Urine 0-5 0-5 /HPF /HPF    Narrative    Urine Culture not indicated       If you have any questions or concerns, please call the clinic at the number listed above.        Sincerely,      Maninder Espinoza MD

## 2022-05-21 LAB
ALBUMIN SERPL-MCNC: 4.2 G/DL (ref 3.4–5)
ALP SERPL-CCNC: 76 U/L (ref 40–150)
ALT SERPL W P-5'-P-CCNC: 34 U/L (ref 0–70)
ANION GAP SERPL CALCULATED.3IONS-SCNC: 2 MMOL/L (ref 3–14)
AST SERPL W P-5'-P-CCNC: 17 U/L (ref 0–45)
BILIRUB SERPL-MCNC: 0.5 MG/DL (ref 0.2–1.3)
BUN SERPL-MCNC: 16 MG/DL (ref 7–30)
CALCIUM SERPL-MCNC: 10 MG/DL (ref 8.5–10.1)
CHLORIDE BLD-SCNC: 105 MMOL/L (ref 94–109)
CHOLEST SERPL-MCNC: 185 MG/DL
CO2 SERPL-SCNC: 32 MMOL/L (ref 20–32)
CREAT SERPL-MCNC: 1.13 MG/DL (ref 0.66–1.25)
FASTING STATUS PATIENT QL REPORTED: YES
GFR SERPL CREATININE-BSD FRML MDRD: 73 ML/MIN/1.73M2
GLUCOSE BLD-MCNC: 102 MG/DL (ref 70–99)
HCV AB SERPL QL IA: NONREACTIVE
HDLC SERPL-MCNC: 50 MG/DL
HIV 1+2 AB+HIV1 P24 AG SERPL QL IA: NONREACTIVE
LDLC SERPL CALC-MCNC: 113 MG/DL
NONHDLC SERPL-MCNC: 135 MG/DL
POTASSIUM BLD-SCNC: 4.7 MMOL/L (ref 3.4–5.3)
PROT SERPL-MCNC: 7.7 G/DL (ref 6.8–8.8)
SODIUM SERPL-SCNC: 139 MMOL/L (ref 133–144)
TRIGL SERPL-MCNC: 112 MG/DL
TSH SERPL DL<=0.005 MIU/L-ACNC: 0.61 MU/L (ref 0.4–4)

## 2022-05-22 LAB — PSA SERPL-MCNC: 0.06 UG/L (ref 0–4)

## 2022-05-23 ENCOUNTER — TRANSFERRED RECORDS (OUTPATIENT)
Dept: HEALTH INFORMATION MANAGEMENT | Facility: CLINIC | Age: 62
End: 2022-05-23

## 2022-05-23 DIAGNOSIS — I10 BENIGN ESSENTIAL HYPERTENSION: ICD-10-CM

## 2022-05-24 RX ORDER — LISINOPRIL AND HYDROCHLOROTHIAZIDE 20; 25 MG/1; MG/1
1 TABLET ORAL DAILY
Qty: 90 TABLET | Refills: 3 | Status: SHIPPED | OUTPATIENT
Start: 2022-05-24 | End: 2023-08-14

## 2022-05-24 NOTE — TELEPHONE ENCOUNTER
Lisinopril-hydrochlorothiazide Zestoretic 20-25 mg tablet  Prescription approved per [facility] Refill Protocol.

## 2022-07-11 ENCOUNTER — OFFICE VISIT (OUTPATIENT)
Dept: URGENT CARE | Facility: URGENT CARE | Age: 62
End: 2022-07-11
Payer: COMMERCIAL

## 2022-07-11 VITALS
WEIGHT: 207.5 LBS | TEMPERATURE: 98.6 F | SYSTOLIC BLOOD PRESSURE: 130 MMHG | OXYGEN SATURATION: 98 % | BODY MASS INDEX: 29.77 KG/M2 | HEART RATE: 75 BPM | RESPIRATION RATE: 16 BRPM | DIASTOLIC BLOOD PRESSURE: 80 MMHG

## 2022-07-11 DIAGNOSIS — M43.16 SPONDYLOLISTHESIS OF LUMBAR REGION: ICD-10-CM

## 2022-07-11 DIAGNOSIS — M54.50 ACUTE MIDLINE LOW BACK PAIN WITHOUT SCIATICA: Primary | ICD-10-CM

## 2022-07-11 PROCEDURE — 99203 OFFICE O/P NEW LOW 30 MIN: CPT | Performed by: FAMILY MEDICINE

## 2022-07-11 NOTE — PROGRESS NOTES
SUBJECTIVE:  Jeramie Sánchez is a 62 year old male who presents for evaluation of back pain.  Symptoms began 3 day(s) ago, have been onset gradual and are stable. Bothers him most when getting out of bed in am and after prolonged sitting.     Pain is located in the low back bilateral region, with radiation to does not radiate, and are at worst a 6 on a scale of 1-10.  Recent injury:recent heavy lifting, heavy trailer tongue    Personal hx of back pain is no prior back problems   Pain is exacerbated by: standing and lying.  Pain is relieved by: none.Associated sx include: none.  Red flag symptoms: negative.    Past Medical History:   Diagnosis Date     Arthritis      Hypertension      JASSON (obstructive sleep apnea)      Prostate cancer (H)      Sleep apnea     uses CPAP     Current Outpatient Medications   Medication Sig Dispense Refill     lisinopril-hydrochlorothiazide (ZESTORETIC) 20-25 MG tablet TAKE 1 TABLET BY MOUTH DAILY 90 tablet 3     multivitamin w/minerals (THERA-VIT-M) tablet Take 1 tablet by mouth daily       History   Smoking Status     Never Smoker   Smokeless Tobacco     Never Used         ROS:  Review of systems negative except as stated above.    OBJECTIVE:  /80 (BP Location: Right arm, Patient Position: Chair, Cuff Size: Adult Regular)   Pulse 75   Temp 98.6  F (37  C) (Oral)   Resp 16   Wt 94.1 kg (207 lb 8 oz)   SpO2 98%   BMI 29.77 kg/m    Back examination: Back symmetric, no curvature. ROM normal. No CVA tenderness.  Straight leg raise test: negative  GENERAL APPEARANCE: healthy, alert and no distress  RESP: lungs clear to auscultation - no rales, rhonchi or wheezes  CV: regular rates and rhythm, normal S1 S2, no murmur noted  ABDOMEN:  soft, nontender, no HSM or masses and bowel sounds normal  NEURO: Normal strength and tone with no weakness or sensory deficit noted, reflexes normal   SKIN: no suspicious lesions or rashes  Xray: spondylosithesis  L4-5  ASSESSMENT/IMPRESSION:  myofascial low back strain and lumbosacral strain    PLAN: the spondylolithesis may be incidental but since new back pain warrants ortho eval.  Therefore referral  Orders Placed This Encounter   Procedures     XR Lumbar Spine 2/3 Views     Spine Referral   Demonstrated gentle stretches and recommended keep moving  Continue stretching and strengthening exercises. Continue prn heat or ice application.  Follow up with primary care provider in no improvement.

## 2022-07-20 ENCOUNTER — OFFICE VISIT (OUTPATIENT)
Dept: NEUROSURGERY | Facility: CLINIC | Age: 62
End: 2022-07-20
Attending: STUDENT IN AN ORGANIZED HEALTH CARE EDUCATION/TRAINING PROGRAM
Payer: COMMERCIAL

## 2022-07-20 ENCOUNTER — ANCILLARY PROCEDURE (OUTPATIENT)
Dept: GENERAL RADIOLOGY | Facility: CLINIC | Age: 62
End: 2022-07-20
Attending: PHYSICIAN ASSISTANT
Payer: COMMERCIAL

## 2022-07-20 VITALS
HEIGHT: 70 IN | DIASTOLIC BLOOD PRESSURE: 74 MMHG | BODY MASS INDEX: 29.63 KG/M2 | OXYGEN SATURATION: 98 % | WEIGHT: 207 LBS | HEART RATE: 68 BPM | SYSTOLIC BLOOD PRESSURE: 120 MMHG

## 2022-07-20 DIAGNOSIS — M54.40 BILATERAL LOW BACK PAIN WITH SCIATICA, SCIATICA LATERALITY UNSPECIFIED, UNSPECIFIED CHRONICITY: ICD-10-CM

## 2022-07-20 DIAGNOSIS — M54.40 BILATERAL LOW BACK PAIN WITH SCIATICA, SCIATICA LATERALITY UNSPECIFIED, UNSPECIFIED CHRONICITY: Primary | ICD-10-CM

## 2022-07-20 PROCEDURE — 99203 OFFICE O/P NEW LOW 30 MIN: CPT | Performed by: PHYSICIAN ASSISTANT

## 2022-07-20 PROCEDURE — G0463 HOSPITAL OUTPT CLINIC VISIT: HCPCS

## 2022-07-20 PROCEDURE — 72120 X-RAY BEND ONLY L-S SPINE: CPT | Mod: TC | Performed by: RADIOLOGY

## 2022-07-20 RX ORDER — METHYLPREDNISOLONE 4 MG
TABLET, DOSE PACK ORAL
Qty: 21 TABLET | Refills: 0 | Status: SHIPPED | OUTPATIENT
Start: 2022-07-20 | End: 2022-08-01

## 2022-07-20 ASSESSMENT — PAIN SCALES - GENERAL: PAINLEVEL: MILD PAIN (2)

## 2022-07-20 NOTE — PROGRESS NOTES
"Jeramie Sánchez is a 62 year old male who presents for:  Chief Complaint   Patient presents with     Consult     Acute midline LBP        Initial Vitals:  /74   Pulse 68   Ht 5' 10\" (1.778 m)   Wt 207 lb (93.9 kg)   SpO2 98%   BMI 29.70 kg/m   Estimated body mass index is 29.7 kg/m  as calculated from the following:    Height as of this encounter: 5' 10\" (1.778 m).    Weight as of this encounter: 207 lb (93.9 kg).. Body surface area is 2.15 meters squared. BP completed using cuff size: large  Mild Pain (2)    Nursing Comments:     Val Mccauley MA    "

## 2022-07-20 NOTE — LETTER
7/20/2022         RE: Jeramie Sánchez  [address] [address]  [place] [address]        Dear Colleague,    Thank you for referring your patient, Jeramie Sánchez, to the [facility]UROS[facility] [place]. Please see a copy of my visit note below.    NEUROSURGERY CLINIC CONSULT NOTE     DATE OF VISIT: 7/20/2022     SUBJECTIVE:     Jeramie Sánchez is a pleasant 62 year old male who presents to the clinic today for consultation on lumbar spine pain. He is referred to the Neurosurgery Clinic by Dr. Jesus in Primary Care.   Today, he reports a two-week history of symptoms. He describes a daily with fluctuating intensity, sharp, aching pain that initiates in the bilateral low lumbar region and radiates posteriorly, distally to his knees in no specific distribution. This pain is not accompanied by paresthesia, numbness or perceived weakness in the same distribution. Inactivity seems to aggravate the symptoms, while alleviation is obtained by mobility. 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 There are no bowel or bladder changes. He denies saddle anesthesia. He denies changes in gait, instability, or falling episodes. He has not participated in conservative therapies.        Current Outpatient Medications:      methylPREDNISolone (MEDROL DOSEPAK) 4 MG tablet therapy pack, Follow Package Directions, Disp: 21 tablet, Rfl: 0     lisinopril-hydrochlorothiazide (ZESTORETIC) 20-25 MG tablet, TAKE 1 TABLET BY MOUTH DAILY, Disp: 90 tablet, Rfl: 3     multivitamin w/minerals (THERA-VIT-M) tablet, Take 1 tablet by mouth daily, Disp: , Rfl:      No Known Allergies     Past Medical History:   Diagnosis Date     Arthritis      Hypertension      JASSON (obstructive sleep apnea)      Prostate cancer (H)      Sleep apnea     uses CPAP        ROS: 10 point review of symptoms are negative other than the symptoms noted above in the HPI.     Family History has been reviewed with the patient, there are  "no pertinent findings to presenting concern.     Past Surgical History:   Procedure Laterality Date     ARTHROPLASTY HIP ANTERIOR Left 11/25/2014    Procedure: ARTHROPLASTY HIP ANTERIOR;  Surgeon: Drew Phelps MD;  Location: [facility] OR     DAVINCI PROSTATECTOMY, LYMPHADENECTOMY Bilateral 12/16/2020    Procedure: PROSTATECTOMY, ROBOT-ASSISTED, WITH PELVIC LYMPHADENECTOMY, POSSIBLE OPEN;  Surgeon: Power Barrera MD;  Location: [facility] OR     ENT SURGERY      jaw repair for dental reasons     HERNIORRHAPHY INGUINAL Left 4/29/2021    Procedure: OPEN LEFT INGUINAL HERNIA REPAIR WITH MESH;  Surgeon: Sourav Simon MD;  Location: [facility] OR     HERNIORRHAPHY VENTRAL N/A 4/29/2021    Procedure: OPEN VENTRAL HERNIA REPAIR WITH MESH;  Surgeon: Sourav Simon MD;  Location: [facility] OR     PROSTATE SURGERY          Social History     Tobacco Use     Smoking status: Never Smoker     Smokeless tobacco: Never Used   Vaping Use     Vaping Use: Never used   Substance Use Topics     Alcohol use: Not Currently     Drug use: No        OBJECTIVE:   /74   Pulse 68   Ht 5' 10\" (1.778 m)   Wt 207 lb (93.9 kg)   SpO2 98%   BMI 29.70 kg/m     Body mass index is 29.7 kg/m .     Imaging:     XR LUMBAR SPINE 2-3 VIEWS 7/11/2022 11:07 AM    Findings, per radiologist read, notable for:      Mild chronic appearing anterior wedging of L1. No other  vertebral body height loss. Grade 1 L4-5 degenerative anterolisthesis.  Normal alignment. Mild L4-5 interbody degeneration with moderate facet  arthropathy. Mild degenerative change of the sacroiliac joints.  Partially visualized left hip instrumentation.    Full radiological report in chart. Imaging was reviewed with with patient today.     Exam:     Patient appears comfortable, conversational, and in no apparent distress.   Head: Normocephalic, without obvious abnormality, atraumatic, no facial asymmetry.   Eyes: conjunctivae/corneas clear. PERRL, EOM's intact.   Throat: lips, mucosa, and " tongue normal; teeth and gums normal.   Neck: supple, symmetrical, trachea midline, no adenopathy and thyroid: not enlarged, symmetric, no tenderness/mass/nodules.   Lungs: clear to auscultation bilaterally.   Heart: regular rate and rhythm.   Abdomen: soft, non-tender; bowel sounds normal; no masses, no organomegaly.   Pulses: 2+ and symmetric.   Skin: Skin color, texture, turgor normal. No rashes or lesions.     CN II-XII grossly intact, alert and appropriate with conversation and following commands.   Gait is non-antalgic. Able to tandem walk. Able to walk on toes and heels without difficulty.   Cervical spine is non tender to palpation. Appropriate range of motion of neck, not concerning for lhermitte's phenomenon.   Bilateral bicep 2/4 and tricep reflexes 1/4. Sensation intact throughout upper extremities.     UE muscle strength  Right  Left    Deltoid  5/5  5/5    Biceps  5/5  5/5    Triceps  5/5  5/5    Hand intrinsics  5/5  5/5    Hand grasp  5/5  5/5    Beard signs  neg  neg      Lumbar spine is tender to palpation at the SI region.  Intact sensation throughout lower extremities.   Bilateral patellar 2/4 and achilles reflex 1/4. Negative for pain with straight leg raise.     LE muscle strength  Right  Left    Iliopsoas (hip flexion)  5/5  5/5    Quad (knee extension)  5/5  5/5    Hamstring (knee flexion)  5/5  5/5    Gastrocnemius (PF)  5/5  5/5    Tibialis Ant. (DF)  5/5  5/5    EHL  5/5  5/5      Negative Babinski bilaterally. Negative for clonus.   Calves are soft and non-tender bilaterally.       ASSESSMENT/PLAN:     Jeramie Sánchez is a 62 year old male who presents to the clinic for consultation on a two-week history of a daily with fluctuating intensity, sharp, aching pain that initiates in the bilateral low lumbar region and radiates posteriorly, distally to his knees in no specific distribution. This pain is not accompanied by paresthesia, numbness or perceived weakness in the same distribution.  The patient's most recent imaging was reviewed with him today. It was explained that images show a Grade 1 L4-5 degenerative anterolisthesis. On exam, he is noted to have appropriate strength, sensation and range of motion.     Based on his physical exam, imaging review, and lack of past treatments, we feel that it would be in his best interest to try a medrol dosepak and to obtain lumbar flexion and extension x-rays.     Should he not obtain adequate alleviation with a MDP, the next steps would be a trial of physical therapy for facet arthropathy / SI joint inflammation, followed by a lumbar MRI and possibly an JOCELINE.     We also discussed signs of a worsening problem that he should seek being evaluated.        Respectfully,     MITCHEL Loja, DARÍO  [facility] Neurosurgery  [facility]  Tel: [phone]      Exam, imaging, and plan reviewed by Dr. Almonte.       Again, thank you for allowing me to participate in the care of your patient.        Sincerely,        Jefry العراقي PA-C

## 2022-07-20 NOTE — PROGRESS NOTES
NEUROSURGERY CLINIC CONSULT NOTE     DATE OF VISIT: 7/20/2022     SUBJECTIVE:     Jeramie Sánchez is a pleasant 62 year old male who presents to the clinic today for consultation on lumbar spine pain. He is referred to the Neurosurgery Clinic by Dr. Jesus in Primary Care.   Today, he reports a two-week history of symptoms. He describes a daily with fluctuating intensity, sharp, aching pain that initiates in the bilateral low lumbar region and radiates posteriorly, distally to his knees in no specific distribution. This pain is not accompanied by paresthesia, numbness or perceived weakness in the same distribution. Inactivity seems to aggravate the symptoms, while alleviation is obtained by mobility. 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 There are no bowel or bladder changes. He denies saddle anesthesia. He denies changes in gait, instability, or falling episodes. He has not participated in conservative therapies.        Current Outpatient Medications:      methylPREDNISolone (MEDROL DOSEPAK) 4 MG tablet therapy pack, Follow Package Directions, Disp: 21 tablet, Rfl: 0     lisinopril-hydrochlorothiazide (ZESTORETIC) 20-25 MG tablet, TAKE 1 TABLET BY MOUTH DAILY, Disp: 90 tablet, Rfl: 3     multivitamin w/minerals (THERA-VIT-M) tablet, Take 1 tablet by mouth daily, Disp: , Rfl:      No Known Allergies     Past Medical History:   Diagnosis Date     Arthritis      Hypertension      JASSON (obstructive sleep apnea)      Prostate cancer (H)      Sleep apnea     uses CPAP        ROS: 10 point review of symptoms are negative other than the symptoms noted above in the HPI.     Family History has been reviewed with the patient, there are no pertinent findings to presenting concern.     Past Surgical History:   Procedure Laterality Date     ARTHROPLASTY HIP ANTERIOR Left 11/25/2014    Procedure: ARTHROPLASTY HIP ANTERIOR;  Surgeon: Drew Phelps MD;  Location: [facility] OR     DAVINCI PROSTATECTOMY,  "LYMPHADENECTOMY Bilateral 12/16/2020    Procedure: PROSTATECTOMY, ROBOT-ASSISTED, WITH PELVIC LYMPHADENECTOMY, POSSIBLE OPEN;  Surgeon: Power Barrera MD;  Location: [facility] OR     ENT SURGERY      jaw repair for dental reasons     HERNIORRHAPHY INGUINAL Left 4/29/2021    Procedure: OPEN LEFT INGUINAL HERNIA REPAIR WITH MESH;  Surgeon: Sourav Simon MD;  Location: [facility] OR     HERNIORRHAPHY VENTRAL N/A 4/29/2021    Procedure: OPEN VENTRAL HERNIA REPAIR WITH MESH;  Surgeon: Sourav Simon MD;  Location: [facility] OR     PROSTATE SURGERY          Social History     Tobacco Use     Smoking status: Never Smoker     Smokeless tobacco: Never Used   Vaping Use     Vaping Use: Never used   Substance Use Topics     Alcohol use: Not Currently     Drug use: No        OBJECTIVE:   /74   Pulse 68   Ht 5' 10\" (1.778 m)   Wt 207 lb (93.9 kg)   SpO2 98%   BMI 29.70 kg/m     Body mass index is 29.7 kg/m .     Imaging:     XR LUMBAR SPINE 2-3 VIEWS 7/11/2022 11:07 AM    Findings, per radiologist read, notable for:      Mild chronic appearing anterior wedging of L1. No other  vertebral body height loss. Grade 1 L4-5 degenerative anterolisthesis.  Normal alignment. Mild L4-5 interbody degeneration with moderate facet  arthropathy. Mild degenerative change of the sacroiliac joints.  Partially visualized left hip instrumentation.    Full radiological report in chart. Imaging was reviewed with with patient today.     Exam:     Patient appears comfortable, conversational, and in no apparent distress.   Head: Normocephalic, without obvious abnormality, atraumatic, no facial asymmetry.   Eyes: conjunctivae/corneas clear. PERRL, EOM's intact.   Throat: lips, mucosa, and tongue normal; teeth and gums normal.   Neck: supple, symmetrical, trachea midline, no adenopathy and thyroid: not enlarged, symmetric, no tenderness/mass/nodules.   Lungs: clear to auscultation bilaterally.   Heart: regular rate and rhythm.   Abdomen: soft, " non-tender; bowel sounds normal; no masses, no organomegaly.   Pulses: 2+ and symmetric.   Skin: Skin color, texture, turgor normal. No rashes or lesions.     CN II-XII grossly intact, alert and appropriate with conversation and following commands.   Gait is non-antalgic. Able to tandem walk. Able to walk on toes and heels without difficulty.   Cervical spine is non tender to palpation. Appropriate range of motion of neck, not concerning for lhermitte's phenomenon.   Bilateral bicep 2/4 and tricep reflexes 1/4. Sensation intact throughout upper extremities.     UE muscle strength  Right  Left    Deltoid  5/5  5/5    Biceps  5/5  5/5    Triceps  5/5  5/5    Hand intrinsics  5/5  5/5    Hand grasp  5/5  5/5    Beard signs  neg  neg      Lumbar spine is tender to palpation at the SI region.  Intact sensation throughout lower extremities.   Bilateral patellar 2/4 and achilles reflex 1/4. Negative for pain with straight leg raise.     LE muscle strength  Right  Left    Iliopsoas (hip flexion)  5/5  5/5    Quad (knee extension)  5/5  5/5    Hamstring (knee flexion)  5/5  5/5    Gastrocnemius (PF)  5/5  5/5    Tibialis Ant. (DF)  5/5  5/5    EHL  5/5  5/5      Negative Babinski bilaterally. Negative for clonus.   Calves are soft and non-tender bilaterally.       ASSESSMENT/PLAN:     Jeramie Sánchez is a 62 year old male who presents to the clinic for consultation on a two-week history of a daily with fluctuating intensity, sharp, aching pain that initiates in the bilateral low lumbar region and radiates posteriorly, distally to his knees in no specific distribution. This pain is not accompanied by paresthesia, numbness or perceived weakness in the same distribution. The patient's most recent imaging was reviewed with him today. It was explained that images show a Grade 1 L4-5 degenerative anterolisthesis. On exam, he is noted to have appropriate strength, sensation and range of motion.     Based on his physical exam,  imaging review, and lack of past treatments, we feel that it would be in his best interest to try a medrol dosepak and to obtain lumbar flexion and extension x-rays.     Should he not obtain adequate alleviation with a MDP, the next steps would be a trial of physical therapy for facet arthropathy / SI joint inflammation, followed by a lumbar MRI and possibly an JOCELINE.     We also discussed signs of a worsening problem that he should seek being evaluated.        Respectfully,     MITCHEL Loja, PA-C  [facility] Neurosurgery  [facility]  Tel: [phone]      Exam, imaging, and plan reviewed by Dr. Almonte.

## 2022-07-31 ASSESSMENT — SLEEP AND FATIGUE QUESTIONNAIRES
HOW LIKELY ARE YOU TO NOD OFF OR FALL ASLEEP WHILE SITTING INACTIVE IN A PUBLIC PLACE: WOULD NEVER DOZE
HOW LIKELY ARE YOU TO NOD OFF OR FALL ASLEEP WHILE WATCHING TV: WOULD NEVER DOZE
HOW LIKELY ARE YOU TO NOD OFF OR FALL ASLEEP IN A CAR, WHILE STOPPED FOR A FEW MINUTES IN TRAFFIC: WOULD NEVER DOZE
HOW LIKELY ARE YOU TO NOD OFF OR FALL ASLEEP WHILE SITTING AND READING: WOULD NEVER DOZE
HOW LIKELY ARE YOU TO NOD OFF OR FALL ASLEEP WHILE SITTING QUIETLY AFTER LUNCH WITHOUT ALCOHOL: WOULD NEVER DOZE
HOW LIKELY ARE YOU TO NOD OFF OR FALL ASLEEP WHILE SITTING AND TALKING TO SOMEONE: WOULD NEVER DOZE
HOW LIKELY ARE YOU TO NOD OFF OR FALL ASLEEP WHILE LYING DOWN TO REST IN THE AFTERNOON WHEN CIRCUMSTANCES PERMIT: WOULD NEVER DOZE
HOW LIKELY ARE YOU TO NOD OFF OR FALL ASLEEP WHEN YOU ARE A PASSENGER IN A CAR FOR AN HOUR WITHOUT A BREAK: WOULD NEVER DOZE

## 2022-08-01 ENCOUNTER — TELEPHONE (OUTPATIENT)
Dept: NEUROSURGERY | Facility: CLINIC | Age: 62
End: 2022-08-01

## 2022-08-01 ENCOUNTER — VIRTUAL VISIT (OUTPATIENT)
Dept: SLEEP MEDICINE | Facility: CLINIC | Age: 62
End: 2022-08-01
Payer: COMMERCIAL

## 2022-08-01 ENCOUNTER — MYC MEDICAL ADVICE (OUTPATIENT)
Dept: NEUROSURGERY | Facility: CLINIC | Age: 62
End: 2022-08-01

## 2022-08-01 VITALS — HEIGHT: 71 IN | WEIGHT: 209 LBS | BODY MASS INDEX: 29.26 KG/M2

## 2022-08-01 DIAGNOSIS — G47.33 OSA (OBSTRUCTIVE SLEEP APNEA): Primary | ICD-10-CM

## 2022-08-01 DIAGNOSIS — M54.40 BILATERAL LOW BACK PAIN WITH SCIATICA, SCIATICA LATERALITY UNSPECIFIED, UNSPECIFIED CHRONICITY: Primary | ICD-10-CM

## 2022-08-01 PROCEDURE — 99203 OFFICE O/P NEW LOW 30 MIN: CPT | Mod: 95 | Performed by: PHYSICIAN ASSISTANT

## 2022-08-01 NOTE — TELEPHONE ENCOUNTER
Patient would like a call back,  States he completed his medication and is starting to feel the pain at times again and would like to know what he should do next. Please call him back at [phone]. Thank you~

## 2022-08-01 NOTE — NURSING NOTE
DME orders have been automatically faxed to [facility]. 1 year appointment reminder will be sent via My Chart. Claire Do CMA

## 2022-08-01 NOTE — NURSING NOTE
Chief Complaint   Patient presents with     Video Visit     New Sleep:  Med check and Re-establish care       Patient confirms medications and allergies are accurate via patients echeck in completion, and or denies any changes since last reviewed/verified.        Flu shot given 11/15/2021    Tameka Green, Virtual Facilitator
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
## 2022-08-01 NOTE — TELEPHONE ENCOUNTER
Patient looking for next steps after MDP -    Pt still having pain at times. Jefry العراقي PA-C's last OV note states Should he not obtain adequate alleviation with a MDP, the next steps would be a trial of physical therapy for facet arthropathy / SI joint inflammation, followed by a lumbar MRI and possibly an JOCELINE.    PT ordered. Message sent to patient with PT number to schedule.

## 2022-08-02 NOTE — TELEPHONE ENCOUNTER
Patient called back and asked if a nurse can call him regarding the next steps. Please call him back at [phone]. Thank you~

## 2022-08-03 NOTE — TELEPHONE ENCOUNTER
Patient request phone call to explain his dx and what PT would do -    Attempted to reach out to patient, no answer. Left voice message for patient to call clinic back to further discuss.      Sent Tytot message. Pt dx: Bilateral low back pain with sciatica and sciatica laterality unspecified, unspecified chronicity. Educated patient that PT is aware of his dx and do exercised to directly relate to that.     Pt is scheduled for PT sessions, he will follow-up with us if after 4 weeks if he does not find relief.

## 2022-08-04 ENCOUNTER — THERAPY VISIT (OUTPATIENT)
Dept: PHYSICAL THERAPY | Facility: CLINIC | Age: 62
End: 2022-08-04
Attending: PHYSICIAN ASSISTANT
Payer: COMMERCIAL

## 2022-08-04 DIAGNOSIS — M54.40 BILATERAL LOW BACK PAIN WITH SCIATICA, SCIATICA LATERALITY UNSPECIFIED, UNSPECIFIED CHRONICITY: ICD-10-CM

## 2022-08-04 PROCEDURE — 97161 PT EVAL LOW COMPLEX 20 MIN: CPT | Mod: GP

## 2022-08-04 PROCEDURE — 97110 THERAPEUTIC EXERCISES: CPT | Mod: GP
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
## 2022-08-05 PROBLEM — M54.40 BILATERAL LOW BACK PAIN WITH SCIATICA, SCIATICA LATERALITY UNSPECIFIED, UNSPECIFIED CHRONICITY: Status: ACTIVE | Noted: 2022-08-05

## 2022-08-11 ENCOUNTER — THERAPY VISIT (OUTPATIENT)
Dept: PHYSICAL THERAPY | Facility: CLINIC | Age: 62
End: 2022-08-11
Payer: COMMERCIAL

## 2022-08-11 DIAGNOSIS — M54.40 BILATERAL LOW BACK PAIN WITH SCIATICA, SCIATICA LATERALITY UNSPECIFIED, UNSPECIFIED CHRONICITY: Primary | ICD-10-CM

## 2022-08-11 PROCEDURE — 97110 THERAPEUTIC EXERCISES: CPT | Mod: GP

## 2022-08-19 ENCOUNTER — THERAPY VISIT (OUTPATIENT)
Dept: PHYSICAL THERAPY | Facility: CLINIC | Age: 62
End: 2022-08-19
Payer: COMMERCIAL

## 2022-08-19 DIAGNOSIS — M54.40 BILATERAL LOW BACK PAIN WITH SCIATICA, SCIATICA LATERALITY UNSPECIFIED, UNSPECIFIED CHRONICITY: Primary | ICD-10-CM

## 2022-08-19 PROCEDURE — 97110 THERAPEUTIC EXERCISES: CPT | Mod: GP

## 2022-10-11 PROBLEM — M54.40 BILATERAL LOW BACK PAIN WITH SCIATICA, SCIATICA LATERALITY UNSPECIFIED, UNSPECIFIED CHRONICITY: Status: RESOLVED | Noted: 2022-08-05 | Resolved: 2022-10-11

## 2022-11-01 ENCOUNTER — TELEPHONE (OUTPATIENT)
Dept: SLEEP MEDICINE | Facility: CLINIC | Age: 62
End: 2022-11-01

## 2022-11-01 NOTE — TELEPHONE ENCOUNTER
Reason for Call: Request for an order or referral:    Order or referral being requested: DOT CPAP report    Date needed: as soon as possible    Has the patient been seen by the PCP for this problem? YES    Additional comments: Please send to email    Phone number Patient can be reached at:  Cell number on file:    Telephone Information:   Mobile [phone]       Best Time:  Any time    Can we leave a detailed message on this number?  YES    Call taken on 11/1/2022 at 8:20 AM by Braden Garcia

## 2023-01-17 ENCOUNTER — OFFICE VISIT (OUTPATIENT)
Dept: NEUROSURGERY | Facility: CLINIC | Age: 63
End: 2023-01-17
Attending: PHYSICIAN ASSISTANT
Payer: COMMERCIAL

## 2023-01-17 VITALS — SYSTOLIC BLOOD PRESSURE: 122 MMHG | OXYGEN SATURATION: 99 % | DIASTOLIC BLOOD PRESSURE: 89 MMHG | HEART RATE: 72 BPM

## 2023-01-17 DIAGNOSIS — M54.40 BILATERAL LOW BACK PAIN WITH SCIATICA, SCIATICA LATERALITY UNSPECIFIED, UNSPECIFIED CHRONICITY: Primary | ICD-10-CM

## 2023-01-17 PROCEDURE — 99213 OFFICE O/P EST LOW 20 MIN: CPT | Performed by: PHYSICIAN ASSISTANT

## 2023-01-17 PROCEDURE — G0463 HOSPITAL OUTPT CLINIC VISIT: HCPCS | Performed by: PHYSICIAN ASSISTANT

## 2023-01-17 PROCEDURE — G0463 HOSPITAL OUTPT CLINIC VISIT: HCPCS

## 2023-01-17 ASSESSMENT — PAIN SCALES - GENERAL: PAINLEVEL: MILD PAIN (2)

## 2023-01-17 NOTE — PROGRESS NOTES
NEUROSURGERY CLINIC PROGRESS NOTE    DATE OF VISIT: 1/17/2023    HPI:     Jeramie Sánchez is a pleasant 62 year old male who presents to the clinic today for a daily with fluctuating intensity, sharp, aching pain that initiates in the bilateral low lumbar region and radiates posteriorly, distally to his knees in no specific distribution. This pain is not accompanied by paresthesia, numbness or perceived weakness in the same distribution. Inactivity seems to aggravate the symptoms, while alleviation is obtained by mobility. There are no bowel or bladder changes. He denies saddle anesthesia. He denies changes in gait, instability, or falling episodes. He has participated in physical therapy that actually sounds like it aggravates his symptoms.     Current Outpatient Medications   Medication     lisinopril-hydrochlorothiazide (ZESTORETIC) 20-25 MG tablet     multivitamin w/minerals (THERA-VIT-M) tablet     No current facility-administered medications for this visit.       No Known Allergies    Past Medical History:   Diagnosis Date     Arthritis      Hypertension      JASSON (obstructive sleep apnea)      Prostate cancer (H)      Sleep apnea     uses CPAP       Review Of Systems    Skin: negative  Eyes: negative  Ears/Nose/Throat: negative  Respiratory: No shortness of breath, dyspnea on exertion, cough, or hemoptysis  Cardiovascular: negative  Gastrointestinal: negative  Musculoskeletal: back pain  Neurologic: negative  Psychiatric: negative  Hematologic/Lymphatic/Immunologic: negative  Endocrine: negative    OBJECTIVE:    /89   Pulse 72   SpO2 99%     Imaging:    LUMBAR BENDING ONLY TWO TO THREE VIEWS July 20, 2022 2:33 PM    No fracture is identified. Multilevel mild-to-moderate  degenerative disc disease. Severe lower lumbar spine facet  arthropathy. Grade 1 anterolisthesis of L4 on L5, worse in flexion  compared to extension. Bilateral hip prostheses.    XR LUMBAR SPINE 2-3 VIEWS 7/11/2022 11:07 AM     Mild  chronic appearing anterior wedging of L1. No other  vertebral body height loss. Grade 1 L4-5 degenerative anterolisthesis.  Normal alignment. Mild L4-5 interbody degeneration with moderate facet  arthropathy. Mild degenerative change of the sacroiliac joints.  Partially visualized left hip instrumentation.    Radiographic Findings: Full radiological report in chart. I personally reviewed the images with the patient today.    Exam:    Patient appears comfortable and in no apparent distress. Moving all extremities.  Gait is non-antalgic.  CN II-XII grossly intact, alert and appropriate with conversation and following  commands  Bilateral upper extremities with full strength including hand intrinsics and grasp.  Sensation intact throughout.  Bilateral lower extremities 5/5 strength including plantar and dorsiflexion.  Normal sensation throughout bilaterally.    ASSESSMENT:    1. Bilateral low back pain with sciatica, sciatica laterality unspecified, unspecified chronicity        PLAN:    Based on his physical exam and him not obtaining alleviation with physical therapy, we will obtain a lumbar MRI to further assess our concern for lumbar stenosis and to determine treatment options for facet arthropathy. Once the images have been obtained he has agreed to call our office back at [phone] to further discuss possible surgical interventions or other conservative therapies. We will also have him obtain an evaluation by our colleague, Dr. Cain, of Physical Medicine and Rehabilitation, to further discuss possible non-operative treatment options to include stretching and strengthening exercises as well as different types of steroid injections/ablations/blocks.      The patient gave verbal understanding and is in agreement with the above plan. He will call or return to the clinic for any worsening or changes in symptoms.      Respectfully,     MITCHEL Graham PA-C

## 2023-01-17 NOTE — LETTER
1/17/2023         RE: Jeramie Sánchez  [address]  [place] [address]        Dear Colleague,    Thank you for referring your patient, Jeramie Sánchez, to the [facility] [place]. Please see a copy of my visit note below.    NEUROSURGERY CLINIC PROGRESS NOTE    DATE OF VISIT: 1/17/2023    HPI:     Jeramie Sánchez is a pleasant 62 year old male who presents to the clinic today for a daily with fluctuating intensity, sharp, aching pain that initiates in the bilateral low lumbar region and radiates posteriorly, distally to his knees in no specific distribution. This pain is not accompanied by paresthesia, numbness or perceived weakness in the same distribution. Inactivity seems to aggravate the symptoms, while alleviation is obtained by mobility. There are no bowel or bladder changes. He denies saddle anesthesia. He denies changes in gait, instability, or falling episodes. He has participated in physical therapy that actually sounds like it aggravates his symptoms.     Current Outpatient Medications   Medication     lisinopril-hydrochlorothiazide (ZESTORETIC) 20-25 MG tablet     multivitamin w/minerals (THERA-VIT-M) tablet     No current facility-administered medications for this visit.       No Known Allergies    Past Medical History:   Diagnosis Date     Arthritis      Hypertension      JASSON (obstructive sleep apnea)      Prostate cancer (H)      Sleep apnea     uses CPAP       Review Of Systems    Skin: negative  Eyes: negative  Ears/Nose/Throat: negative  Respiratory: No shortness of breath, dyspnea on exertion, cough, or hemoptysis  Cardiovascular: negative  Gastrointestinal: negative  Musculoskeletal: back pain  Neurologic: negative  Psychiatric: negative  Hematologic/Lymphatic/Immunologic: negative  Endocrine: negative    OBJECTIVE:    /89   Pulse 72   SpO2 99%     Imaging:    LUMBAR BENDING ONLY TWO TO THREE VIEWS July 20, 2022 2:33 PM    No fracture is  identified. Multilevel mild-to-moderate  degenerative disc disease. Severe lower lumbar spine facet  arthropathy. Grade 1 anterolisthesis of L4 on L5, worse in flexion  compared to extension. Bilateral hip prostheses.    XR LUMBAR SPINE 2-3 VIEWS 7/11/2022 11:07 AM     Mild chronic appearing anterior wedging of L1. No other  vertebral body height loss. Grade 1 L4-5 degenerative anterolisthesis.  Normal alignment. Mild L4-5 interbody degeneration with moderate facet  arthropathy. Mild degenerative change of the sacroiliac joints.  Partially visualized left hip instrumentation.    Radiographic Findings: Full radiological report in chart. I personally reviewed the images with the patient today.    Exam:    Patient appears comfortable and in no apparent distress. Moving all extremities.  Gait is non-antalgic.  CN II-XII grossly intact, alert and appropriate with conversation and following  commands  Bilateral upper extremities with full strength including hand intrinsics and grasp.  Sensation intact throughout.  Bilateral lower extremities 5/5 strength including plantar and dorsiflexion.  Normal sensation throughout bilaterally.    ASSESSMENT:    1. Bilateral low back pain with sciatica, sciatica laterality unspecified, unspecified chronicity        PLAN:    Based on his physical exam and him not obtaining alleviation with physical therapy, we will obtain a lumbar MRI to further assess our concern for lumbar stenosis and to determine treatment options for facet arthropathy. Once the images have been obtained he has agreed to call our office back at [phone] to further discuss possible surgical interventions or other conservative therapies. We will also have him obtain an evaluation by our colleague, Dr. Cain, of Physical Medicine and Rehabilitation, to further discuss possible non-operative treatment options to include stretching and strengthening exercises as well as different types of steroid  injections/ablations/blocks.      The patient gave verbal understanding and is in agreement with the above plan. He will call or return to the clinic for any worsening or changes in symptoms.      Respectfully,     MITCHEL Graham PA-C      Again, thank you for allowing me to participate in the care of your patient.        Sincerely,        Jefry العراقي PA-C

## 2023-01-17 NOTE — PATIENT INSTRUCTIONS
-Order placed for lumbar MR imaging WO. Please call [facility] at [phone] to schedule the date, time, and location that is most convenient for you to obtain your imaging. Should [facility] be booked out too far, other options would include [facility] at [phone] as well as [facility] at [phone]. Once you have a scheduled MRI, please contact my office to schedule a follow-up appointment either over the phone or in person. You can contact my office at [phone].        MITCHEL Loja, DARÍO  [facility] Neurosurgery  [facility]     Tel: [phone]  Fax: [phone]

## 2023-01-18 ENCOUNTER — HOSPITAL ENCOUNTER (OUTPATIENT)
Dept: MRI IMAGING | Facility: CLINIC | Age: 63
Discharge: HOME | End: 2023-01-18
Attending: PHYSICIAN ASSISTANT | Admitting: PHYSICIAN ASSISTANT
Payer: COMMERCIAL

## 2023-01-18 DIAGNOSIS — M54.40 BILATERAL LOW BACK PAIN WITH SCIATICA, SCIATICA LATERALITY UNSPECIFIED, UNSPECIFIED CHRONICITY: ICD-10-CM

## 2023-01-18 PROCEDURE — 72148 MRI LUMBAR SPINE W/O DYE: CPT

## 2023-01-19 DIAGNOSIS — M47.817 FACET ARTHROPATHY, LUMBOSACRAL: Primary | ICD-10-CM

## 2023-01-19 NOTE — PROGRESS NOTES
Per JAMISON Enriquez (neurosurgery), ordered medial branch blocks of the bilateral L4-L5 facet joints for Mr. Jeramie ARANDA Gonzalo for lumbosacral facet arthropathy.    Jorge L Cain MD

## 2023-01-23 ENCOUNTER — TELEPHONE (OUTPATIENT)
Dept: NEUROSURGERY | Facility: CLINIC | Age: 63
End: 2023-01-23
Payer: COMMERCIAL

## 2023-01-23 NOTE — TELEPHONE ENCOUNTER
Patient called to request results of his MRI. Please call patient back at [phone] - ok to leave detailed message. Thank you

## 2023-01-26 ENCOUNTER — TELEPHONE (OUTPATIENT)
Dept: NEUROSURGERY | Facility: CLINIC | Age: 63
End: 2023-01-26
Payer: COMMERCIAL

## 2023-01-26 NOTE — CONFIDENTIAL NOTE
Patient called requesting MRI results and next steps. Patient would like to discuss results prior to injection appointment scheduled for 1/30.    Encounter routed to provider for review and recommendations.

## 2023-01-26 NOTE — TELEPHONE ENCOUNTER
Patient is requesting to discuss MRI results with Jefry العراقي and his team. Please call patient before his nerve block appointment. He would like to know if it is ok to continue on with the nerve block and that is why he wants to discuss the MRI results. Thank you.

## 2023-01-30 ENCOUNTER — TELEPHONE (OUTPATIENT)
Dept: NEUROSURGERY | Facility: CLINIC | Age: 63
End: 2023-01-30
Payer: COMMERCIAL

## 2023-01-30 NOTE — TELEPHONE ENCOUNTER
Jefry العراقي PA-C called patient to discuss. Reynaldo recommended patient move forward with JOCELINE on 2/13. He stated patient will also discuss his L3 lesion. He has a hx of prostate cancer. He stated   PSA is normal but he should let his Oncologist/PCP know about this. Patient verbalized understadning.

## 2023-01-30 NOTE — TELEPHONE ENCOUNTER
Addressed in separate encounter. Patient called for results last week and message was routed to Jefry العراقي PA-C. Reynaldo called patient today, see separate note for details.

## 2023-01-30 NOTE — TELEPHONE ENCOUNTER
Patient is requesting call from Jefry العراقي to discuss MRI results and next steps in treatment. Patient was scheduled to get in injection, but cancelled due not knowing what the MRI results say and unsure why he needed an injection. Pleas call patient back to discuss MRI results and give clarification on patient's questions.

## 2023-02-13 ENCOUNTER — HOSPITAL ENCOUNTER (OUTPATIENT)
Dept: GENERAL RADIOLOGY | Facility: CLINIC | Age: 63
Discharge: HOME | End: 2023-02-13
Attending: PHYSICAL MEDICINE & REHABILITATION | Admitting: PHYSICAL MEDICINE & REHABILITATION
Payer: COMMERCIAL

## 2023-02-13 VITALS
DIASTOLIC BLOOD PRESSURE: 72 MMHG | RESPIRATION RATE: 18 BRPM | HEART RATE: 89 BPM | SYSTOLIC BLOOD PRESSURE: 134 MMHG | OXYGEN SATURATION: 98 %

## 2023-02-13 DIAGNOSIS — M47.817 FACET ARTHROPATHY, LUMBOSACRAL: ICD-10-CM

## 2023-02-13 PROCEDURE — 64493 INJ PARAVERT F JNT L/S 1 LEV: CPT | Mod: 50

## 2023-02-13 PROCEDURE — 64493 INJ PARAVERT F JNT L/S 1 LEV: CPT | Mod: LT | Performed by: PHYSICAL MEDICINE & REHABILITATION

## 2023-02-13 PROCEDURE — 255N000002 HC RX 255 OP 636: Performed by: PHYSICAL MEDICINE & REHABILITATION

## 2023-02-13 PROCEDURE — 250N000009 HC RX 250

## 2023-02-13 RX ORDER — BUPIVACAINE HYDROCHLORIDE 5 MG/ML
INJECTION, SOLUTION EPIDURAL; INTRACAUDAL
Status: COMPLETED
Start: 2023-02-13 | End: 2023-02-13

## 2023-02-13 RX ORDER — BUPIVACAINE HYDROCHLORIDE 5 MG/ML
2 INJECTION, SOLUTION EPIDURAL; INTRACAUDAL ONCE
Status: COMPLETED | OUTPATIENT
Start: 2023-02-13 | End: 2023-02-13

## 2023-02-13 RX ORDER — LIDOCAINE HYDROCHLORIDE 10 MG/ML
5 INJECTION, SOLUTION EPIDURAL; INFILTRATION; INTRACAUDAL; PERINEURAL ONCE
Status: COMPLETED | OUTPATIENT
Start: 2023-02-13 | End: 2023-02-13

## 2023-02-13 RX ORDER — LIDOCAINE HYDROCHLORIDE 10 MG/ML
INJECTION, SOLUTION EPIDURAL; INFILTRATION; INTRACAUDAL; PERINEURAL
Status: COMPLETED
Start: 2023-02-13 | End: 2023-02-13

## 2023-02-13 RX ORDER — IOPAMIDOL 408 MG/ML
10 INJECTION, SOLUTION INTRATHECAL ONCE
Status: COMPLETED | OUTPATIENT
Start: 2023-02-13 | End: 2023-02-13

## 2023-02-13 RX ADMIN — BUPIVACAINE HYDROCHLORIDE 2 ML: 5 INJECTION, SOLUTION EPIDURAL; INTRACAUDAL at 14:58

## 2023-02-13 RX ADMIN — LIDOCAINE HYDROCHLORIDE 1 ML: 10 INJECTION, SOLUTION EPIDURAL; INFILTRATION; INTRACAUDAL; PERINEURAL at 14:58

## 2023-02-13 RX ADMIN — IOPAMIDOL 1.6 ML: 408 INJECTION, SOLUTION INTRATHECAL at 14:57

## 2023-02-13 RX ADMIN — BUPIVACAINE HYDROCHLORIDE 2 ML: 5 INJECTION, SOLUTION EPIDURAL; INTRACAUDAL; PERINEURAL at 14:58

## 2023-02-13 NOTE — PROCEDURES
"PHYSICAL MEDICINE & REHABILITATION / MEDICAL SPINE      PROCEDURE DATE:  Feb 13, 2023      PATIENT NAME:  Jeramie Sánchez  MRN:  [id number]  YOB: 1960      PRE-PROCEDURE DIAGNOSIS:  1. Facet arthropathy, lumbosacral        POST-PROCEDURE DIAGNOSIS:  1. Facet arthropathy, lumbosacral        PROCEDURE:  Fluoroscopic-guided diagnostic medial branch blocks of the bilateral L4-L5 facet joints.  (CPT code/s:  67069)      PROCEDURE IN DETAIL:  Prior to the procedure, educational material was provided for the patient to review and take home.  After a discussion of the risks, benefits, and alternatives to the procedure, the patient expressed understanding and wished to proceed.  Consent form was signed.  The patient was brought to the fluoroscopy suite and placed in the prone position.  Procedural pause was conducted to verify:  patient identity, procedure to be performed, laterality, site, and patient position.    The skin was sterilely prepped using chlorhexidine and allowed to dry.  The skin was draped in the usual sterile fashion.  After identifying the junctions of the transverse processes and superior articular processes for the medial branches of the bilateral L4-L5 facet joints with fluoroscopy, the skin was infiltrated with 0.25 ml 1% preservative-free lidocaine at each level.  Then, 25g 3.5\" Quincke needles were advanced to the junctions of the transverse processes and superior articular processes using fluoroscopic guidance.  Proper needle positioning was confirmed using multiple fluoroscopic views.  At each level, after negative aspiration, 0.4 ml Isovue-M 200 (iopamidol) was injected using live fluoroscopy, showing appropriate spread without evidence of intraarticular or intravascular uptake.  After confirming correct placement, 0.5 ml 0.5% bupivacaine was injected at each site.  Following the injections, the needles were extracted.    The patient tolerated the procedure well, and there were no " apparent complications.  The patient was escorted back to the postprocedure room.  The patient was monitored for side effects.  No reactions were noted.  After appropriate observation, the patient was dismissed from the clinic in good condition.    Preprocedure pain level: 7/10.  Postprocedure pain level: 0/10.      Jorge L Cain MD

## 2023-02-13 NOTE — PROGRESS NOTES
Patient tolerated bilateral MBB of L4-L5 well by Dr Cain.  Site Clean Dry and intact, dressing applied with no drainage.  Patient rated pre procedural pain at 7/10 and post pain at 0/10.  Patient home per friend, understands written and oral instructions.    Ector Ventura, RN, BSN

## 2023-03-08 ENCOUNTER — TELEPHONE (OUTPATIENT)
Dept: NEUROSURGERY | Facility: CLINIC | Age: 63
End: 2023-03-08
Payer: COMMERCIAL

## 2023-03-08 NOTE — TELEPHONE ENCOUNTER
Patient states that the branch block didn't really do much for pain.  This was done on 2-13-23.  Patient interested in knowing what the next steps will be.

## 2023-03-17 NOTE — TELEPHONE ENCOUNTER
Attempted to reach out to patient, no answer. Left voice message for them to call clinic back to further discuss.

## 2023-03-17 NOTE — TELEPHONE ENCOUNTER
Patient is returning call from nursing. He has been out of the area for some time. Patient states that the procedure didn't work as expected and would like a call back on next steps.  If patient doesn't answer, okay to leave a detail message

## 2023-03-20 NOTE — TELEPHONE ENCOUNTER
Patient reports greater than 80% pain relief for greater than 2 hours after MBB.     Will route to Provider for approval of next MBB.

## 2023-03-22 ENCOUNTER — TELEPHONE (OUTPATIENT)
Dept: NEUROSURGERY | Facility: CLINIC | Age: 63
End: 2023-03-22
Payer: COMMERCIAL

## 2023-03-22 DIAGNOSIS — M47.817 FACET ARTHROPATHY, LUMBOSACRAL: Primary | ICD-10-CM

## 2023-03-22 NOTE — TELEPHONE ENCOUNTER
OK to get 2nd MBB as per Reynaldo. Message sent to Dr Cain.     Attempted to reach out to patient, no answer. Left voice message for them to call clinic back to further discuss.

## 2023-03-22 NOTE — PROGRESS NOTES
On 02/13/2023, Mr. Jeramie Sánchez had fluoroscopic-guided diagnostic medial branch blocks of the bilateral L4-L5 facet joints.  I received a message from neurosurgery staff that Mr. Jeramie Sánchez had greater than 80% pain relief lasting for more than a day from these injections.  Mr. Jeramie Sánchez will be scheduled for fluoroscopic-guided confirmatory medial branch blocks of the bilateral L4-L5 facet joints.    Jorge L Cain MD

## 2023-04-20 ENCOUNTER — HOSPITAL ENCOUNTER (OUTPATIENT)
Dept: GENERAL RADIOLOGY | Facility: CLINIC | Age: 63
Discharge: HOME | End: 2023-04-20
Attending: PHYSICAL MEDICINE & REHABILITATION | Admitting: PHYSICAL MEDICINE & REHABILITATION
Payer: COMMERCIAL

## 2023-04-20 VITALS
RESPIRATION RATE: 18 BRPM | OXYGEN SATURATION: 98 % | HEART RATE: 70 BPM | SYSTOLIC BLOOD PRESSURE: 131 MMHG | DIASTOLIC BLOOD PRESSURE: 94 MMHG

## 2023-04-20 DIAGNOSIS — M47.817 FACET ARTHROPATHY, LUMBOSACRAL: Primary | ICD-10-CM

## 2023-04-20 PROCEDURE — 64493 INJ PARAVERT F JNT L/S 1 LEV: CPT | Mod: 50

## 2023-04-20 PROCEDURE — 250N000009 HC RX 250

## 2023-04-20 PROCEDURE — 255N000002 HC RX 255 OP 636: Performed by: PHYSICAL MEDICINE & REHABILITATION

## 2023-04-20 PROCEDURE — 250N000011 HC RX IP 250 OP 636

## 2023-04-20 PROCEDURE — 64493 INJ PARAVERT F JNT L/S 1 LEV: CPT | Mod: LT | Performed by: PHYSICAL MEDICINE & REHABILITATION

## 2023-04-20 RX ORDER — IOPAMIDOL 408 MG/ML
10 INJECTION, SOLUTION INTRATHECAL ONCE
Status: COMPLETED | OUTPATIENT
Start: 2023-04-20 | End: 2023-04-20

## 2023-04-20 RX ORDER — LIDOCAINE HYDROCHLORIDE 10 MG/ML
5 INJECTION, SOLUTION EPIDURAL; INFILTRATION; INTRACAUDAL; PERINEURAL ONCE
Status: COMPLETED | OUTPATIENT
Start: 2023-04-20 | End: 2023-04-20

## 2023-04-20 RX ORDER — LIDOCAINE HYDROCHLORIDE 10 MG/ML
INJECTION, SOLUTION EPIDURAL; INFILTRATION; INTRACAUDAL; PERINEURAL
Status: COMPLETED
Start: 2023-04-20 | End: 2023-04-20

## 2023-04-20 RX ADMIN — LIDOCAINE HYDROCHLORIDE 0.6 ML: 10 INJECTION, SOLUTION EPIDURAL; INFILTRATION; INTRACAUDAL; PERINEURAL at 09:36

## 2023-04-20 RX ADMIN — LIDOCAINE HYDROCHLORIDE 2 ML: 20 INJECTION, SOLUTION EPIDURAL; INFILTRATION; INTRACAUDAL; PERINEURAL at 09:47

## 2023-04-20 RX ADMIN — IOPAMIDOL 1 ML: 408 INJECTION, SOLUTION INTRATHECAL at 09:46

## 2023-04-20 NOTE — PROGRESS NOTES
Assisted MD Cain in bilateral medial branch blocks of L4-L5 facet joints. Medications given per MAR. Pain prior to procedure 6/10 low back. Pain post procedure 3/10. Pt tolerated procedure well. Injection site covered with dressing. Dressing clean, dry and intact at time of discharge. Discharge instructions given and all questions answered. Pt left ambulatory in stable condition.

## 2023-04-20 NOTE — PROCEDURES
"PHYSICAL MEDICINE & REHABILITATION / MEDICAL SPINE      PROCEDURE DATE:  Apr 20, 2023      PATIENT NAME:  Jeramie Sánchez  MRN:  [id number]  YOB: 1960      PRE-PROCEDURE DIAGNOSIS:  1. Facet arthropathy, lumbosacral        POST-PROCEDURE DIAGNOSIS:  1. Facet arthropathy, lumbosacral        PROCEDURE:  Fluoroscopic-guided diagnostic medial branch blocks of the bilateral L4-L5 facet joints.  (CPT code:  12448)      PROCEDURE IN DETAIL:  Prior to the procedure, educational material was provided for the patient to review and take home.  After a discussion of the risks, benefits, and alternatives to the procedure, the patient expressed understanding and wished to proceed.  Consent form was signed.  The patient was brought to the fluoroscopy suite and placed in the prone position.  Procedural pause was conducted to verify:  patient identity, procedure to be performed, laterality, site, and patient position.    The skin was sterilely prepped using chlorhexidine and allowed to dry.  The skin was draped in the usual sterile fashion.  After identifying the junctions of the transverse processes and superior articular processes for the medial branches of the bilateral L4-L5 facet joints with fluoroscopy, the skin was infiltrated with 0.15 ml 1% preservative-free lidocaine at each level.  Then, 25g 3.5\" Quincke needles were advanced to the junctions of the transverse processes and superior articular processes using fluoroscopic guidance.  Proper needle positioning was confirmed using multiple fluoroscopic views.  At each level, after negative aspiration, 0.25 ml Isovue-M 200 (iopamidol) was injected using live fluoroscopy, showing appropriate spread without evidence of intraarticular or intravascular uptake.  After confirming correct placement, 0.5 ml 2% lidocaine was injected at each site.  Following the injections, the needles were extracted.    The patient tolerated the procedure well, and there were no apparent " complications.  The patient was escorted back to the postprocedure room.  The patient was monitored for side effects.  No reactions were noted.  After appropriate observation, the patient was dismissed from the clinic in good condition.    Preprocedure pain level: 6/10.  Postprocedure pain level: 1/10.      Jorge L Cain MD

## 2023-05-06 ENCOUNTER — MYC MEDICAL ADVICE (OUTPATIENT)
Dept: NEUROSURGERY | Facility: CLINIC | Age: 63
End: 2023-05-06
Payer: COMMERCIAL

## 2023-05-08 ENCOUNTER — DOCUMENTATION ONLY (OUTPATIENT)
Dept: NEUROSURGERY | Facility: CLINIC | Age: 63
End: 2023-05-08
Payer: COMMERCIAL

## 2023-05-19 DIAGNOSIS — M47.817 FACET ARTHROPATHY, LUMBOSACRAL: Primary | ICD-10-CM

## 2023-05-19 NOTE — PROGRESS NOTES
On 02/13/2023, Mr. Jeramie Sánchez had fluoroscopic-guided diagnostic medial branch blocks of the bilateral L4-L5 facet joints.  I received a message from neurosurgery staff that Mr. Jeramie Sánchez had greater than 80% pain relief lasting for more than a day from these injections.    On 04/20/2023, Mr. Jeramie Sánchez had fluoroscopic-guided confirmatory medial branch blocks of the bilateral L4-L5 facet joints.  Mr. Sánchez reported 80% to 100% pain relief lasting at least 6 hours after the injections.    Mr. Jeramie Sánchez will be set up for radiofrequency ablations / neurotomies of the bilateral L4-L5 facet joints.      Jorge L Cain MD

## 2023-05-19 NOTE — TELEPHONE ENCOUNTER
On 02/13/2023, Mr. Jeramie Sánchez had fluoroscopic-guided diagnostic medial branch blocks of the bilateral L4-L5 facet joints.  I received a message from neurosurgery staff that Mr. Jeramie Sánchez had greater than 80% pain relief lasting for more than a day from these injections.    On 04/20/2023, Mr. Jeramie Sánchez had fluoroscopic-guided confirmatory medial branch blocks of the bilateral L4-L5 facet joints.  Mr. Sánchez reported 80% to 100% pain relief lasting at least 6 hours after the injections.    Mr. Jeramie Sánchez will be set up for radiofrequency ablations / neurotomies of the bilateral L4-L5 facet joints.      Jorge L Cain MD        You  Juliocesar ARANDA Gonzalo Just now (9:34 AM)     MW  Mr. Jeramie Sánchez,    That is great news.  We will get you set up for radiofrequency ablations / neurotomies of the bilateral L4-L5 facet joints.     MD [facility] (supporting Jefry العراقي PA-C) 11 days ago       Thanks much Karma!!         Karma Gage RN Tom J Burt 11 days ago     MG  I received it! Thanks for sending. I will get this scanned into your chart and then I will update Jefry العراقي PA-C and have him take a look. We will be in touch with next steps.      Thanks,  RENAE Parada [facility]        [facility]ocesar [facility] (supporting Jefry العراقي PA-C) 11 days ago       Kelli Parada!         Karma Gage RN Tom J Burt 11 days ago     MG  [email or web address]     ThanksKarma RN M [facility]        [facility]oc[facility] (supporting Jefry العراقي PA-C) 11 days ago       Edy Parada!      What is the email and thanks!       Karma Garcia RN Tom J Burt 11 days ago     MG Edy Gandara,      You can send this to us via [employer] (using the paperclip icon) or you can email this to us as well. Let me know what works best for you       Thanks,  RENAE Parada  [facility]        [facility]ocesar [facility] (supporting Dulce Maria, Jefry Al PA-C) 13 days ago       How do I send the form I filled out that notes the 6 hours after the procedure was done?   I have a photo copy of it in my phone.   Do not have a fax machine to fax it in.      Thanks,  Juliocesar Sánchez    58

## 2023-05-23 ENCOUNTER — TELEPHONE (OUTPATIENT)
Dept: ORTHOPEDICS | Facility: CLINIC | Age: 63
End: 2023-05-23
Payer: COMMERCIAL

## 2023-05-25 ENCOUNTER — TELEPHONE (OUTPATIENT)
Dept: ORTHOPEDICS | Facility: CLINIC | Age: 63
End: 2023-05-25
Payer: COMMERCIAL

## 2023-05-25 PROBLEM — M47.817 FACET ARTHROPATHY, LUMBOSACRAL: Status: ACTIVE | Noted: 2023-05-25

## 2023-05-25 NOTE — TELEPHONE ENCOUNTER
Patient called to schedule RFA with Dr. Cain. Writer provided the next few days with availability, patient is confirming a ride and will call back.    Kaushal Britt on 5/25/2023 at 8:13 AM

## 2023-06-15 NOTE — TELEPHONE ENCOUNTER
Called and LVM for patient to reach out when he is ready to move forward.    Stefani Seth on 6/15/23 at 1:49 PM  Surgery Scheduling   [phone]

## 2023-07-01 ENCOUNTER — HEALTH MAINTENANCE LETTER (OUTPATIENT)
Age: 63
End: 2023-07-01

## 2023-08-14 DIAGNOSIS — I10 BENIGN ESSENTIAL HYPERTENSION: ICD-10-CM

## 2023-08-14 RX ORDER — LISINOPRIL AND HYDROCHLOROTHIAZIDE 20; 25 MG/1; MG/1
1 TABLET ORAL DAILY
Qty: 90 TABLET | Refills: 3 | Status: SHIPPED | OUTPATIENT
Start: 2023-08-14 | End: 2024-08-02

## 2023-08-14 NOTE — TELEPHONE ENCOUNTER
Routing refill request to provider for review/approval because:  Labs not current  Patient needs to be seen because it has been more than 1 year since last office visit.    Please advise, thanks.

## 2023-10-19 ENCOUNTER — TELEPHONE (OUTPATIENT)
Dept: NEUROSURGERY | Facility: CLINIC | Age: 63
End: 2023-10-19
Payer: COMMERCIAL

## 2023-10-19 DIAGNOSIS — G47.33 OBSTRUCTIVE SLEEP APNEA (ADULT) (PEDIATRIC): Primary | ICD-10-CM

## 2023-10-19 NOTE — TELEPHONE ENCOUNTER
[facility] Call Center    Phone Message    May a detailed message be left on voicemail: yes     Reason for Call: Other: Patient is requesting an order for another spine injection.     Please place order in chart so scheduling team can reach out to him.    Action Taken: Message routed to:  Other: [facility]    Travel Screening: Not Applicable

## 2023-10-19 NOTE — TELEPHONE ENCOUNTER
Patient had 2 positive MBBs(2/13/23 and 4/20/23) with Dr Cain with plan for the RFA, which looks still to be done.     Attempted to reach out to patient, no answer. Left voice message for them to call clinic back to further discuss.

## 2023-10-24 DIAGNOSIS — M47.817 FACET ARTHROPATHY, LUMBOSACRAL: Primary | ICD-10-CM

## 2023-10-24 NOTE — PROGRESS NOTES
On 02/13/2023, Mr. Jeramie Sánchez had fluoroscopic-guided diagnostic medial branch blocks of the bilateral L4-L5 facet joints.  I received a message from neurosurgery staff that Mr. Jeramie Sánchez had greater than 80% pain relief lasting for more than a day from these injections.     On 04/20/2023, Mr. Jeramie Sánchez had fluoroscopic-guided confirmatory medial branch blocks of the bilateral L4-L5 facet joints.  Mr. Sánchez reported 80% to 100% pain relief lasting at least 6 hours after the injections.     Mr. Jeramie Sánchze will be set up for radiofrequency ablations / neurotomies of the bilateral L4-L5 facet joints.        Jorge L Cain MD

## 2023-10-25 ENCOUNTER — TELEPHONE (OUTPATIENT)
Dept: ORTHOPEDICS | Facility: CLINIC | Age: 63
End: 2023-10-25
Payer: COMMERCIAL

## 2023-10-25 ENCOUNTER — HOSPITAL ENCOUNTER (OUTPATIENT)
Facility: AMBULATORY SURGERY CENTER | Age: 63
End: 2023-10-25
Attending: PHYSICAL MEDICINE & REHABILITATION
Payer: COMMERCIAL

## 2023-10-25 NOTE — TELEPHONE ENCOUNTER
Dr. Cain reports he ordered the RFAs.     Called patient to provide update, patient verbalized understanding. Reviewed he will be getting a separate call for scheduling.

## 2023-10-25 NOTE — TELEPHONE ENCOUNTER
Patient is scheduled for surgery with Dr. Cain    Spoke with: patient    Date of Surgery: 12/06/23    [place] [facility] [place]    Informed patient they will need an adult  yes    Additional comments: Patient is aware of date and time of the procedure.        Angélica Barney MA on 10/25/2023 at 3:20 PM

## 2024-01-12 ENCOUNTER — TRANSFERRED RECORDS (OUTPATIENT)
Dept: HEALTH INFORMATION MANAGEMENT | Facility: CLINIC | Age: 64
End: 2024-01-12
Payer: COMMERCIAL

## 2024-01-24 ENCOUNTER — TELEPHONE (OUTPATIENT)
Dept: ORTHOPEDICS | Facility: CLINIC | Age: 64
End: 2024-01-24
Payer: COMMERCIAL

## 2024-01-24 DIAGNOSIS — M47.817 FACET ARTHROPATHY, LUMBOSACRAL: Primary | ICD-10-CM

## 2024-01-24 NOTE — TELEPHONE ENCOUNTER
[facility] Call Center    Phone Message    May a detailed message be left on voicemail: yes     Reason for Call: Other: Jeramie Snow is calling because he needs a new order for the injection because the scheduling team said the other one . Rayo call him once placed     Action Taken: Other: [facility]    Travel Screening: Not Applicable

## 2024-01-25 ENCOUNTER — TELEPHONE (OUTPATIENT)
Dept: ORTHOPEDICS | Facility: CLINIC | Age: 64
End: 2024-01-25
Payer: COMMERCIAL

## 2024-01-25 ENCOUNTER — TELEPHONE (OUTPATIENT)
Dept: PALLIATIVE MEDICINE | Facility: CLINIC | Age: 64
End: 2024-01-25
Payer: COMMERCIAL

## 2024-01-25 DIAGNOSIS — M47.816 SPONDYLOSIS OF LUMBAR REGION WITHOUT MYELOPATHY OR RADICULOPATHY: Primary | ICD-10-CM

## 2024-01-25 NOTE — TELEPHONE ENCOUNTER
Order received: Radiofrequency ablations of the bilateral L4-L5 facet joints.         Routing to get a PA for [place] in .      Tresa Nixon  Complex   [facility]

## 2024-01-26 ENCOUNTER — TRANSFERRED RECORDS (OUTPATIENT)
Dept: HEALTH INFORMATION MANAGEMENT | Facility: CLINIC | Age: 64
End: 2024-01-26
Payer: COMMERCIAL

## 2024-01-29 NOTE — TELEPHONE ENCOUNTER
THANH Rodriguez states there is already an approval on file:      CASE ID- [id number]     AUTH #- [id number]     SUBMIT DATE- 10/31/2023     CASE STATUS- Approved      EFFECTIVE DATES- 10/31/2023- 04/28/2024      CPT CODE- 76357-    Approved M47.817       OKAY TO SCHEDULE LRFA      Pauline Rahman   [facility]

## 2024-01-29 NOTE — TELEPHONE ENCOUNTER
Screening Questions for RFA Procedure      Procedure ordered? Radiofrequency ablations of the bilateral L4-L5 facet joints.      What insurance are we billing for this procedure?  [employer]  IF SCHEDULING AT [facility] PLEASE SCHEDULE AT LEAST 7-10 BUSINESS DAYS OUT SO A PA CAN BE OBTAINED    Is patient scheduled at [facility]? no  If YES, route every encounter to [facility] CARE NAVIGATION POOL [[phone]]  Has patient had this injection before? No  Any chance of pregnancy? NO   If YES, do NOT schedule and route to RN pool     Is  Needed?: No  Will patient have a ?  Yes   If pt is given sedation meds, no driving for 24 hours.  Is pt taking a cab or transportation service? NO      If so will need to be accompanied by an adult too (friend/family member) in order for IV sedation to be given.    Per [facility] Policy:  Outpatients are to have responsible adult or family member to accompany them at discharge and drive them home. A service providing medically trained drivers or attendants would be acceptable. Public transportation would not be acceptable unless the patient is accompanied by a responsible adult or family member.  Is patient taking any blood thinners (i.e. plavix, coumadin, jantoven, warfarin, heparin, pradaxa or dabigatran, etc)? No   If YES, do NOT schedule, and route to RN pool    Is patient taking any aspirin products? No   If more than 325mg/day, OK to schedule; Instruct pt to decrease to less than 325 mg for 7 days AND route to RN pool  For CERVICAL procedures, hold all aspirin products for 6 days.   Tell pt that if aspirin product is not held for 6 days, the procedure WILL BE cancelled.      Does the patient have a bleeding or clotting disorder? No   If YES, it it OKAY to schedule AND route to RN pool  **For any patients with platelet count <100, must be forwarded to provider**    Is patient diabetic? No If YES, have them bring their glucometer.    Does  patient have an active infection or treated for one within the past week? No   If YES, do NOT schedule and route to RN nurse pool     Is patient currently taking any antibiotics?  No  For patients on chronic, preventative, or prophylactic antibiotics, procedures may be scheduled.   For patients on antibiotics for active or recent infection:antibiotic course must have been completed for 4 days    Is patient currently taking any steroid medications? (i.e. Prednisone, Medrol)  No   For patients on steroid medications, course must have been completed for 4 days    Is patient actively being treated for cancer or immunocompromised, including the spleen having been removed? No  If YES, do NOT schedule and route to RN pool     Any history of complications with sedation medications?  NO   If YES, OK to schedule AND route to RN pool     Any history of sleep apnea?  YES   If YES, OK to schedule AND route to RN pool     Any cardiac history?  NO   If YES, OK to schedule AND route to RN pool     Do you have an implanted pacemaker, ICD (implanted cardiac device) or AICD (automatic implanted cardiac device)?  NO  If YES, do NOT schedule AND route to RN pool.   Obtain name of device :     Obtain name of cardiologist:       Do you have an implanted stimulator?  NO  If YES, OK to schedule AND route to nursing.   Instruct patient to bring in the remote to the appointment and it will need to be turned off.  reviewed      Does patient have an allergy to contrast dye, iodine or shellfish?  No   If YES, OK to schedule. Route to RN pool AND add allergy information to appointment notes    Are you able to get on and off an exam table with minimal or no assistance? Yes   If NO, do NOT schedule and route to RN pool    Are you able to roll over and lay on your stomach with minimal or no assistance? Yes   If NO, do NOT schedule and route to RN pool    Reminders:  If you are started on any steroids or antibiotics between now and your  appointment, you must contact us because it may affect our ability to perform your procedure.  Yes  Informed patient that s/he needs to fast for 6 hours before procedure?  YES  Informed patient that it is OK to take normal medications with sips of water, especially blood pressure medications, before the procedure and must hold blood thinners as instructed.  Yes  Informed patient to arrive 30 minutes before procedure time to have an IV inserted.  reviewed   Do NOT schedule at 0745, 0815 or 1245.  reviewed   All radiofrequency ablations are in a 90 minute time slot.  reviewed

## 2024-01-29 NOTE — TELEPHONE ENCOUNTER
LVM to schedule Radiofrequency ablations of the bilateral L4-L5 facet joints with wempe in [facility]   [facility]

## 2024-02-02 ENCOUNTER — OFFICE VISIT (OUTPATIENT)
Dept: INTERNAL MEDICINE | Facility: CLINIC | Age: 64
End: 2024-02-02
Payer: COMMERCIAL

## 2024-02-02 VITALS
BODY MASS INDEX: 29.92 KG/M2 | HEIGHT: 70 IN | RESPIRATION RATE: 16 BRPM | DIASTOLIC BLOOD PRESSURE: 73 MMHG | TEMPERATURE: 98.5 F | SYSTOLIC BLOOD PRESSURE: 116 MMHG | OXYGEN SATURATION: 99 % | WEIGHT: 209 LBS | HEART RATE: 77 BPM

## 2024-02-02 DIAGNOSIS — Z01.818 PRE-OP EXAM: Primary | ICD-10-CM

## 2024-02-02 DIAGNOSIS — R22.31 MASS OF RIGHT FINGER: ICD-10-CM

## 2024-02-02 DIAGNOSIS — Z85.46 HISTORY OF PROSTATE CANCER: ICD-10-CM

## 2024-02-02 DIAGNOSIS — I10 ESSENTIAL HYPERTENSION: ICD-10-CM

## 2024-02-02 DIAGNOSIS — G47.33 OSA (OBSTRUCTIVE SLEEP APNEA): ICD-10-CM

## 2024-02-02 PROBLEM — C61 PROSTATE CANCER (H): Status: RESOLVED | Noted: 2020-08-13 | Resolved: 2024-02-02

## 2024-02-02 LAB
ERYTHROCYTE [DISTWIDTH] IN BLOOD BY AUTOMATED COUNT: 13 % (ref 10–15)
HCT VFR BLD AUTO: 38.5 % (ref 40–53)
HGB BLD-MCNC: 12.4 G/DL (ref 13.3–17.7)
MCH RBC QN AUTO: 32.4 PG (ref 26.5–33)
MCHC RBC AUTO-ENTMCNC: 32.2 G/DL (ref 31.5–36.5)
MCV RBC AUTO: 101 FL (ref 78–100)
PLATELET # BLD AUTO: 195 10E3/UL (ref 150–450)
RBC # BLD AUTO: 3.83 10E6/UL (ref 4.4–5.9)
WBC # BLD AUTO: 4.4 10E3/UL (ref 4–11)

## 2024-02-02 PROCEDURE — 36415 COLL VENOUS BLD VENIPUNCTURE: CPT

## 2024-02-02 PROCEDURE — 99214 OFFICE O/P EST MOD 30 MIN: CPT

## 2024-02-02 PROCEDURE — 85027 COMPLETE CBC AUTOMATED: CPT

## 2024-02-02 NOTE — PATIENT INSTRUCTIONS
Stop all over the counter supplements starting today. If needing anything for pain, tylenol is just fine but do not use ibuprofen/aspirin/aleve.    Take your blood pressure medication on the morning of as usual.          Please let me know if you have any questions.    Thanks!  IRENE Hearn, CNP   [facility]

## 2024-02-02 NOTE — PROGRESS NOTES
Preoperative Evaluation  [facility]  [address]  [address]  [facility] [address]  Phone: [phone]  Primary Provider: Maninder Espinoza  Pre-op Performing Provider: ROCKY MCDANIEL  Feb 2, 2024       Juliocesar is a 63 year old, presenting for the following:  Pre-Op Exam        2/2/2024     7:51 AM   Additional Questions   Roomed by Azalea     Surgical Information  Surgery/Procedure: Removable volar proximal phalanx mass -right index finer    Surgery Location: [facility]   Surgeon: Dr Simmons  Surgery Date: 2/7/24  Time of Surgery: 8:00 am   Where patient plans to recover: At home with family  Fax number for surgical facility: [phone]    Assessment & Plan     The proposed surgical procedure is considered LOW risk.    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 Continue Lisinopril-hydrochlorothiazide 20-25MG  Plan:     (Z85.46) History of prostate cancer  Comment: hx of prostate cancer in 2020  Plan:        - No identified additional risk factors other than previously addressed    Antiplatelet or Anticoagulation Medication Instructions   - Patient is on no antiplatelet or anticoagulation medications.    Additional Medication Instructions  Patient is to take all scheduled medications on the day of surgery    Recommendation  APPROVAL GIVEN to proceed with proposed procedure, without further diagnostic evaluation.      Subjective       HPI related to upcoming procedure: Pt with history of HTN, prostate cancer (2020), presents for pre-operative exam.         1/30/2024    12:22 PM   Preop Questions   1. Have you ever had a heart attack or stroke? No   2.  Have you ever had surgery on your heart or blood vessels, such as a stent placement, a coronary artery bypass, or surgery on an artery in your head, neck, heart, or legs? No   3. Do you have chest pain with activity? No   4. Do you have a history of  heart failure? No   5. Do you currently have a cold, bronchitis or symptoms of other infection? No   6. Do you have a cough, shortness of breath, or wheezing? No   7. Do you or anyone in your family have previous history of blood clots? No   8. Do you or does anyone in your family have a serious bleeding problem such as prolonged bleeding following surgeries or cuts? No   9. Have you ever had problems with anemia or been told to take iron pills? No   10. Have you had any abnormal blood loss such as black, tarry or bloody stools? No   11. Have you ever had a blood transfusion? No   12. Are you willing to have a blood transfusion if it is medically needed before, during, or after your surgery? Yes   13. Have you or any of your relatives ever had problems with anesthesia? No   14. Do you have sleep apnea, excessive snoring or daytime drowsiness? YES - Uses cpap nightly    14a. Do you have a CPAP machine? Yes   15. Do you have any artifical heart valves or other implanted medical devices like a pacemaker, defibrillator, or continuous glucose monitor? No   16. Do you have artificial joints? YES - bilateral hips   17. Are you allergic to latex? No       Health Care Directive  Patient does not have a Health Care Directive or Living Will: Discussed advance care planning with patient; however, patient declined at this time.    Preoperative Review of    reviewed - no record of controlled substances prescribed.          Patient Active Problem List    Diagnosis Date Noted    Facet arthropathy, lumbosacral 05/25/2023     Priority: Medium     Added automatically from request for surgery [id number]      Incisional hernia, without obstruction or gangrene 08/19/2021     Priority:  Medium     Added automatically from request for surgery [id number]      Left inguinal hernia 04/05/2021     Priority: Medium     Added automatically from request for surgery [id number]      Ventral hernia 04/05/2021     Priority: Medium     Added automatically from request for surgery [id number]      Prostate cancer (H) 08/13/2020     Priority: Medium     Added automatically from request for surgery [id number]      JASSON (obstructive sleep apnea) 09/18/2018     Priority: Medium     HST, 9/17/2018, AHI: 57       Essential hypertension 01/05/2016     Priority: Medium    Hip pain 11/25/2014     Priority: Medium      Past Medical History:   Diagnosis Date    Arthritis     Hypertension     JASSON (obstructive sleep apnea)     Prostate cancer (H)     Sleep apnea     uses CPAP     Past Surgical History:   Procedure Laterality Date    ARTHROPLASTY HIP ANTERIOR Left 11/25/2014    Procedure: ARTHROPLASTY HIP ANTERIOR;  Surgeon: Drew Phelps MD;  Location: [facility] OR    DAVINCI PROSTATECTOMY, LYMPHADENECTOMY Bilateral 12/16/2020    Procedure: PROSTATECTOMY, ROBOT-ASSISTED, WITH PELVIC LYMPHADENECTOMY, POSSIBLE OPEN;  Surgeon: Power Barrera MD;  Location: [facility] OR    ENT SURGERY      jaw repair for dental reasons    HERNIA REPAIR      HERNIORRHAPHY INGUINAL Left 04/29/2021    Procedure: OPEN LEFT INGUINAL HERNIA REPAIR WITH MESH;  Surgeon: Sourav Simon MD;  Location: [facility] OR    HERNIORRHAPHY VENTRAL N/A 04/29/2021    Procedure: OPEN VENTRAL HERNIA REPAIR WITH MESH;  Surgeon: Sourav Simon MD;  Location: [facility] OR    PROSTATE SURGERY       Current Outpatient Medications   Medication Sig Dispense Refill    lisinopril-hydrochlorothiazide (ZESTORETIC) 20-25 MG tablet Take 1 tablet by mouth daily 90 tablet 3    multivitamin w/minerals (THERA-VIT-M) tablet Take 1 tablet by mouth daily         No Known Allergies     Social History     Tobacco Use    Smoking status: Never    Smokeless tobacco: Never   Substance Use Topics    Alcohol use: Not  "Currently       History   Drug Use No             Review of Systems  CONSTITUTIONAL: NEGATIVE for fever, chills, change in weight  ENT/MOUTH: NEGATIVE for ear, mouth and throat problems  RESP: NEGATIVE for significant cough or SOB  CV: NEGATIVE for chest pain, palpitations or peripheral edema    .  Objective    /73   Pulse 77   Temp 98.5  F (36.9  C) (Oral)   Resp 16   Ht 5' 10\" (1.778 m)   Wt 209 lb (94.8 kg)   SpO2 99%   BMI 29.99 kg/m     Estimated body mass index is 29.99 kg/m  as calculated from the following:    Height as of this encounter: 5' 10\" (1.778 m).    Weight as of this encounter: 209 lb (94.8 kg).      Physical Exam  Constitutional:       General: He is not in acute distress.     Appearance: Normal appearance. He is not ill-appearing, toxic-appearing or diaphoretic.   HENT:      Head: Normocephalic and atraumatic.   Eyes:      Conjunctiva/sclera: Conjunctivae normal.   Cardiovascular:      Rate and Rhythm: Normal rate and regular rhythm.      Heart sounds: Normal heart sounds.   Pulmonary:      Effort: Pulmonary effort is normal.      Breath sounds: Normal breath sounds.   Skin:     General: Skin is warm and dry.   Neurological:      Mental Status: He is alert and oriented to person, place, and time.   Psychiatric:         Mood and Affect: Mood normal.         Behavior: Behavior normal.         Thought Content: Thought content normal.         Judgment: Judgment normal.           Recent Labs   Lab Test 05/20/22  1121   HGB 13.7         POTASSIUM 4.7   CR 1.13        Diagnostics  Labs pending at this time.  Results will be reviewed when available.   No EKG required, no history of coronary heart disease, significant arrhythmia, peripheral arterial disease or other structural heart disease.    Revised Cardiac Risk Index (RCRI)  The patient has the following serious cardiovascular risks for perioperative complications:   - No serious cardiac risks = 0 points     RCRI " Interpretation: 0 points: Class I (very low risk - 0.4% complication rate)         Signed Electronically by: IRENE Hearn CNP  Copy of this evaluation report is provided to requesting physician.

## 2024-02-07 ENCOUNTER — LAB REQUISITION (OUTPATIENT)
Dept: LAB | Facility: CLINIC | Age: 64
End: 2024-02-07
Payer: COMMERCIAL

## 2024-02-07 DIAGNOSIS — R22.31 LOCALIZED SWELLING, MASS AND LUMP, RIGHT UPPER LIMB: ICD-10-CM

## 2024-02-07 PROCEDURE — 88305 TISSUE EXAM BY PATHOLOGIST: CPT | Mod: 26 | Performed by: STUDENT IN AN ORGANIZED HEALTH CARE EDUCATION/TRAINING PROGRAM

## 2024-02-07 PROCEDURE — 88341 IMHCHEM/IMCYTCHM EA ADD ANTB: CPT | Mod: 26 | Performed by: STUDENT IN AN ORGANIZED HEALTH CARE EDUCATION/TRAINING PROGRAM

## 2024-02-07 PROCEDURE — 88341 IMHCHEM/IMCYTCHM EA ADD ANTB: CPT | Mod: TC,ORL | Performed by: STUDENT IN AN ORGANIZED HEALTH CARE EDUCATION/TRAINING PROGRAM

## 2024-02-07 PROCEDURE — 88342 IMHCHEM/IMCYTCHM 1ST ANTB: CPT | Mod: 26 | Performed by: STUDENT IN AN ORGANIZED HEALTH CARE EDUCATION/TRAINING PROGRAM

## 2024-02-09 LAB
PATH REPORT.COMMENTS IMP SPEC: NORMAL
PATH REPORT.COMMENTS IMP SPEC: NORMAL
PATH REPORT.FINAL DX SPEC: NORMAL
PATH REPORT.GROSS SPEC: NORMAL
PATH REPORT.MICROSCOPIC SPEC OTHER STN: NORMAL
PATH REPORT.RELEVANT HX SPEC: NORMAL
PHOTO IMAGE: NORMAL

## 2024-02-21 ENCOUNTER — TRANSFERRED RECORDS (OUTPATIENT)
Dept: HEALTH INFORMATION MANAGEMENT | Facility: CLINIC | Age: 64
End: 2024-02-21
Payer: COMMERCIAL

## 2024-03-11 ENCOUNTER — TRANSFERRED RECORDS (OUTPATIENT)
Dept: HEALTH INFORMATION MANAGEMENT | Facility: CLINIC | Age: 64
End: 2024-03-11
Payer: COMMERCIAL

## 2024-08-02 DIAGNOSIS — I10 BENIGN ESSENTIAL HYPERTENSION: ICD-10-CM

## 2024-08-02 RX ORDER — LISINOPRIL AND HYDROCHLOROTHIAZIDE 20; 25 MG/1; MG/1
1 TABLET ORAL DAILY
Qty: 90 TABLET | Refills: 3 | Status: SHIPPED | OUTPATIENT
Start: 2024-08-02

## 2024-08-24 ENCOUNTER — HEALTH MAINTENANCE LETTER (OUTPATIENT)
Age: 64
End: 2024-08-24

## 2024-10-27 NOTE — NURSING NOTE
"Chief Complaint   Patient presents with     CPAP Follow Up       Initial /73  Pulse 84  Resp 16  Ht 1.791 m (5' 10.5\")  Wt 87.2 kg (192 lb 3.2 oz)  SpO2 100%  BMI 27.19 kg/m2 Estimated body mass index is 27.19 kg/(m^2) as calculated from the following:    Height as of this encounter: 1.791 m (5' 10.5\").    Weight as of this encounter: 87.2 kg (192 lb 3.2 oz).    Medication Reconciliation: complete     ESS 0  Rani Shane        " No

## 2025-02-18 DIAGNOSIS — G47.33 OSA (OBSTRUCTIVE SLEEP APNEA): Primary | ICD-10-CM

## 2025-02-19 NOTE — PROGRESS NOTES
Last visit was in 8/2022. He has an appointment on the schedule in October.  CPAP DME order signed.  Bennett Goltz, PA-C

## 2025-06-02 ENCOUNTER — PATIENT OUTREACH (OUTPATIENT)
Dept: CARE COORDINATION | Facility: CLINIC | Age: 65
End: 2025-06-02
Payer: COMMERCIAL

## 2025-08-02 DIAGNOSIS — I10 BENIGN ESSENTIAL HYPERTENSION: ICD-10-CM

## 2025-08-04 DIAGNOSIS — I10 BENIGN ESSENTIAL HYPERTENSION: ICD-10-CM

## 2025-08-04 RX ORDER — LISINOPRIL AND HYDROCHLOROTHIAZIDE 20; 25 MG/1; MG/1
1 TABLET ORAL DAILY
Qty: 90 TABLET | OUTPATIENT
Start: 2025-08-04

## 2025-08-04 RX ORDER — LISINOPRIL AND HYDROCHLOROTHIAZIDE 20; 25 MG/1; MG/1
1 TABLET ORAL DAILY
Qty: 30 TABLET | Refills: 0 | Status: SHIPPED | OUTPATIENT
Start: 2025-08-04

## 2025-09-02 DIAGNOSIS — I10 BENIGN ESSENTIAL HYPERTENSION: ICD-10-CM

## 2025-09-02 RX ORDER — LISINOPRIL AND HYDROCHLOROTHIAZIDE 20; 25 MG/1; MG/1
1 TABLET ORAL DAILY
Qty: 90 TABLET | Refills: 1 | Status: SHIPPED | OUTPATIENT
Start: 2025-09-02

## (undated) DEVICE — SPONGE RAY-TEC 4X8" 7318

## (undated) DEVICE — ESU CORD MONOPOLAR 10'  E0510

## (undated) DEVICE — ENDO SNARE POLYPECTOMY OVAL 15MM LOOP SD-240U-15

## (undated) DEVICE — ENDO POUCH UNIV RETRIEVAL SYSTEM INZII 10MM CD001

## (undated) DEVICE — SOL WATER IRRIG 1000ML BOTTLE 2F7114

## (undated) DEVICE — SU WND CLOSURE VLOC 180 ABS 2-0 9" GS-21 VLOCL0345

## (undated) DEVICE — DAVINCI OBTURATOR 8MM BLADELESS 420023

## (undated) DEVICE — CLIP ENDO HEMO-LOC PURPLE LG 544240

## (undated) DEVICE — KIT PATIENT POSITIONING PIGAZZI LATEX FREE 40580

## (undated) DEVICE — ESU GROUND PAD ADULT W/CORD E7507

## (undated) DEVICE — LINEN HALF SHEET 5512

## (undated) DEVICE — DAVINCI HOT SHEARS TIP COVER  400180

## (undated) DEVICE — BLADE CLIPPER 3M 9670

## (undated) DEVICE — DRAPE LAP W/ARMBOARD 29410

## (undated) DEVICE — CATH FOLEY 20FR 5ML LUBRICATH LATEX 0165L20

## (undated) DEVICE — ADH SKIN CLOSURE PREMIERPRO EXOFIN 1.0ML 3470

## (undated) DEVICE — DEVICE RETRIEVAL ROTH NET PLATINUM UNIV 2.5MMX230CM 00715050

## (undated) DEVICE — LINEN ORTHO PACK 5446

## (undated) DEVICE — SU WND CLOSURE V-LOC 3-0 CV-23 6" VLOCM1904

## (undated) DEVICE — CATH HOLDER STRAP 36600

## (undated) DEVICE — SU PDS II 1 CT MONOFIL Z353H

## (undated) DEVICE — TAPE CLOTH ADHESIVE 3" LATEX FREE

## (undated) DEVICE — PREP SCRUB SOL EXIDINE 4% CHG 4OZ 29002-404

## (undated) DEVICE — DRAIN PENROSE 0.50"X18" LATEX FREE GR203

## (undated) DEVICE — LINEN TOWEL PACK X10 5473

## (undated) DEVICE — SU VICRYL 3-0 TIE 12X18" J904T

## (undated) DEVICE — SU MONOCRYL 4-0 PS-2 18" UND Y496G

## (undated) DEVICE — ENDO TROCAR CONMED AIRSEAL BLADELESS 12X120MM IAS12-120LP

## (undated) DEVICE — SU VICRYL 3-0 SH 27" UND J416H

## (undated) DEVICE — LINEN TOWEL PACK X5 5464

## (undated) DEVICE — PAD CHUX UNDERPAD 30X36" P3036C

## (undated) DEVICE — PREP SKIN SCRUB TRAY 4461A

## (undated) DEVICE — LINEN FULL SHEET 5511

## (undated) DEVICE — SUCTION IRR STRYKERFLOW II W/TIP 250-070-520

## (undated) DEVICE — PACK DAVINCI UROLOGY SBA15UDFSG

## (undated) DEVICE — DECANTER VIAL 2006S

## (undated) DEVICE — SU PDS II 2-0 SH 27" Z317H

## (undated) DEVICE — SU SILK 2-0 PSL 18" 673H

## (undated) DEVICE — GLOVE PROTEXIS W/NEU-THERA 7.5  2D73TE75

## (undated) DEVICE — PACK MINOR CUSTOM RIDGES SBA32RMRMA

## (undated) DEVICE — SU WND CLOSURE VLOC 90 ABS 3-0 VIOLET 6" CV-23 VLOCM0804

## (undated) DEVICE — ESU PENCIL W/SMOKE EVAC CVPLP2000

## (undated) DEVICE — SU PDS II 0 CT-2 27" Z334H

## (undated) DEVICE — DRAPE MAYO STAND 23X54 8337

## (undated) DEVICE — SU VICRYL 3-0 SH 27" J316H

## (undated) DEVICE — ENDO TROCAR FIRST ENTRY KII FIOS Z-THRD 12X100MM CTF73

## (undated) DEVICE — ADH SKIN CLOSURE PREMIERPRO EXOFIN MICOR HV 0.5ML 3471

## (undated) DEVICE — PROTECTOR ARM ONE-STEP TRENDELENBURG 40418

## (undated) DEVICE — DRAIN JACKSON PRATT 15FR ROUND SU130-1323

## (undated) DEVICE — DAVINCI DRAPE KIT 4-ARM 420291

## (undated) DEVICE — DRAIN JACKSON PRATT RESERVOIR 100ML SU130-1305

## (undated) DEVICE — BAG CLEAR TRASH 1.3M 39X33" P4040C

## (undated) DEVICE — ESU CORD BIPOLAR GREEN 10-4000

## (undated) DEVICE — KIT ENDO TURNOVER/PROCEDURE W/CLEAN A SCOPE LINERS 103888

## (undated) DEVICE — TUBING CONMED AIRSEAL SMOKE EVAC INSUFFLATION ASM-EVAC

## (undated) DEVICE — SU VICRYL 4-0 PS-2 18" UND J496H

## (undated) DEVICE — GLOVE PROTEXIS BLUE W/NEU-THERA 8.0  2D73EB80

## (undated) DEVICE — SYR 50ML SLIP TIP W/O NDL 309654

## (undated) DEVICE — CLEANSER JET LAVAGE IRRISEPT 0.05% CHG IRRISEPT45USA

## (undated) DEVICE — SOL NACL 0.9% INJ 1000ML BAG 2B1324X

## (undated) RX ORDER — DEXAMETHASONE SODIUM PHOSPHATE 4 MG/ML
INJECTION, SOLUTION INTRA-ARTICULAR; INTRALESIONAL; INTRAMUSCULAR; INTRAVENOUS; SOFT TISSUE
Status: DISPENSED
Start: 2021-04-29

## (undated) RX ORDER — CEFAZOLIN SODIUM 1 G/3ML
INJECTION, POWDER, FOR SOLUTION INTRAMUSCULAR; INTRAVENOUS
Status: DISPENSED
Start: 2020-12-16

## (undated) RX ORDER — PROPOFOL 10 MG/ML
INJECTION, EMULSION INTRAVENOUS
Status: DISPENSED
Start: 2020-12-16

## (undated) RX ORDER — PROPOFOL 10 MG/ML
INJECTION, EMULSION INTRAVENOUS
Status: DISPENSED
Start: 2021-04-29

## (undated) RX ORDER — FENTANYL CITRATE 50 UG/ML
INJECTION, SOLUTION INTRAMUSCULAR; INTRAVENOUS
Status: DISPENSED
Start: 2021-04-29

## (undated) RX ORDER — CELECOXIB 200 MG/1
CAPSULE ORAL
Status: DISPENSED
Start: 2021-04-29

## (undated) RX ORDER — CEFAZOLIN SODIUM 2 G/100ML
INJECTION, SOLUTION INTRAVENOUS
Status: DISPENSED
Start: 2021-04-29

## (undated) RX ORDER — ONDANSETRON 2 MG/ML
INJECTION INTRAMUSCULAR; INTRAVENOUS
Status: DISPENSED
Start: 2021-04-29

## (undated) RX ORDER — OXYCODONE HYDROCHLORIDE 5 MG/1
TABLET ORAL
Status: DISPENSED
Start: 2021-04-29

## (undated) RX ORDER — HYDROMORPHONE HYDROCHLORIDE 1 MG/ML
INJECTION, SOLUTION INTRAMUSCULAR; INTRAVENOUS; SUBCUTANEOUS
Status: DISPENSED
Start: 2020-12-16

## (undated) RX ORDER — GLYCOPYRROLATE 0.2 MG/ML
INJECTION INTRAMUSCULAR; INTRAVENOUS
Status: DISPENSED
Start: 2021-04-29

## (undated) RX ORDER — FENTANYL CITRATE-0.9 % NACL/PF 10 MCG/ML
PLASTIC BAG, INJECTION (ML) INTRAVENOUS
Status: DISPENSED
Start: 2020-12-16

## (undated) RX ORDER — BUPIVACAINE HYDROCHLORIDE AND EPINEPHRINE 2.5; 5 MG/ML; UG/ML
INJECTION, SOLUTION EPIDURAL; INFILTRATION; INTRACAUDAL; PERINEURAL
Status: DISPENSED
Start: 2021-04-29

## (undated) RX ORDER — FENTANYL CITRATE 50 UG/ML
INJECTION, SOLUTION INTRAMUSCULAR; INTRAVENOUS
Status: DISPENSED
Start: 2018-05-04

## (undated) RX ORDER — ACETAMINOPHEN 325 MG/1
TABLET ORAL
Status: DISPENSED
Start: 2021-04-29

## (undated) RX ORDER — BUPIVACAINE HYDROCHLORIDE 2.5 MG/ML
INJECTION, SOLUTION EPIDURAL; INFILTRATION; INTRACAUDAL
Status: DISPENSED
Start: 2020-12-16

## (undated) RX ORDER — NEOSTIGMINE METHYLSULFATE 1 MG/ML
VIAL (ML) INJECTION
Status: DISPENSED
Start: 2021-04-29

## (undated) RX ORDER — LABETALOL 20 MG/4 ML (5 MG/ML) INTRAVENOUS SYRINGE
Status: DISPENSED
Start: 2020-12-16

## (undated) RX ORDER — CEFAZOLIN SODIUM 2 G/100ML
INJECTION, SOLUTION INTRAVENOUS
Status: DISPENSED
Start: 2020-12-16

## (undated) RX ORDER — EPHEDRINE SULFATE 50 MG/ML
INJECTION, SOLUTION INTRAMUSCULAR; INTRAVENOUS; SUBCUTANEOUS
Status: DISPENSED
Start: 2021-04-29

## (undated) RX ORDER — FENTANYL CITRATE 50 UG/ML
INJECTION, SOLUTION INTRAMUSCULAR; INTRAVENOUS
Status: DISPENSED
Start: 2020-12-16

## (undated) RX ORDER — LIDOCAINE HYDROCHLORIDE 10 MG/ML
INJECTION, SOLUTION EPIDURAL; INFILTRATION; INTRACAUDAL; PERINEURAL
Status: DISPENSED
Start: 2021-04-29